# Patient Record
Sex: MALE | Race: BLACK OR AFRICAN AMERICAN | Employment: OTHER | URBAN - METROPOLITAN AREA
[De-identification: names, ages, dates, MRNs, and addresses within clinical notes are randomized per-mention and may not be internally consistent; named-entity substitution may affect disease eponyms.]

---

## 2017-04-24 ENCOUNTER — OFFICE VISIT (OUTPATIENT)
Dept: INTERNAL MEDICINE CLINIC | Age: 67
End: 2017-04-24

## 2017-04-24 VITALS
OXYGEN SATURATION: 94 % | HEIGHT: 67 IN | RESPIRATION RATE: 17 BRPM | DIASTOLIC BLOOD PRESSURE: 70 MMHG | SYSTOLIC BLOOD PRESSURE: 118 MMHG | HEART RATE: 95 BPM | TEMPERATURE: 97 F

## 2017-04-24 DIAGNOSIS — N04.9 NEPHROTIC SYNDROME: Primary | ICD-10-CM

## 2017-04-24 DIAGNOSIS — S88.912D AMPUTATION OF BOTH LOWER EXTREMITIES, SUBSEQUENT ENCOUNTER (HCC): ICD-10-CM

## 2017-04-24 DIAGNOSIS — S88.911D AMPUTATION OF BOTH LOWER EXTREMITIES, SUBSEQUENT ENCOUNTER (HCC): ICD-10-CM

## 2017-04-24 DIAGNOSIS — N18.1 CKD (CHRONIC KIDNEY DISEASE), STAGE I: ICD-10-CM

## 2017-04-24 DIAGNOSIS — I10 ESSENTIAL HYPERTENSION: ICD-10-CM

## 2017-04-24 RX ORDER — BUMETANIDE 2 MG/1
2 TABLET ORAL DAILY
Qty: 30 TAB | Refills: 6 | Status: SHIPPED | OUTPATIENT
Start: 2017-04-24 | End: 2017-05-03

## 2017-04-24 NOTE — PROGRESS NOTES
SPORTS MEDICINE AND PRIMARY CARE  Natty Maxwell MD, [address] [address]  Phone:  [phone]  Fax: [phone]      Chief Complaint   Patient presents with    Follow-up     4 month visit    Cold Symptoms     cough and congestion         SUBECTIVE:    Jesus Manuel Whiteside is a 73 y.o. male Patient is currently complaining of a cold and a cough productive of mucoid sputum. He is using over-the-counter remedies with some relief. Other new complaints are denied. Patient is seen for evaluation. Current Outpatient Prescriptions   Medication Sig Dispense Refill    bumetanide (BUMEX) 2 mg tablet Take 1 Tab by mouth daily. 30 Tab 6    tamsulosin (FLOMAX) 0.4 mg capsule TAKE ONE CAPSULE BY MOUTH DAILY 90 Cap 11    trimethoprim-polymyxin b (POLYTRIM) ophthalmic solution Administer 1 Drop to left eye every four (4) hours. 10 mL 0    benztropine (COGENTIN) 0.5 mg tablet 1 tablet qd 30 Tab 11    mirtazapine (REMERON) 15 mg tablet Take 1 Tab by mouth nightly. 27 Tab 11     Past Medical History:   Diagnosis Date    Amputation of both lower extremities (HCC)     tenderness and swollen left breeast    Aneurysm (HCC)     Conjunctivitis of right eye 08/30/2016    Hypertension     Pancreatic mass     Prediabetes     Renal failure     S/P colonoscopy 1-4-08    Wound, open      Past Surgical History:   Procedure Laterality Date    ABDOMEN SURGERY PROC UNLISTED      colonoscopy    CARDIAC SURG PROCEDURE UNLIST      HX ORTHOPAEDIC      bilateral amputation    HX UROLOGICAL      left orchiectomy    NEUROLOGICAL PROCEDURE UNLISTED  7/2004    aneursym     No Known Allergies    REVIEW OF SYSTEMS:   No hemoptysis.   No mucopurulent material.        Social History     Social History    Marital status:      Spouse name: N/A    Number of children: N/A    Years of education: N/A     Social History Main Topics    Smoking status: Former Smoker    Smokeless tobacco: Never Used    Alcohol use No    Drug use: No    Sexual activity: Yes     Partners: Female     Other Topics Concern    None     Social History Narrative   r  Family History   Problem Relation Age of Onset    No Known Problems Father        OBJECTIVE:  Visit Vitals    /70 (BP 1 Location: Left arm, BP Patient Position: Sitting)    Pulse 95    Temp 97 °F (36.1 °C) (Oral)    Resp 17    Ht 5' 7\" (1.702 m)    SpO2 94%     ENT: perrla,  eom intact  NECK: supple. Thyroid normal  CHEST: clear to ascultation and percussion   HEART: regular rate and rhythm  ABD: soft, bowel sounds active  EXTREMITIES: no edema, pulse 1+     No visits with results within 3 Month(s) from this visit. Latest known visit with results is:    Office Visit on 12/12/2016   Component Date Value Ref Range Status    Hemoglobin A1c 12/12/2016 6.4* 4.8 - 5.6 % Final    Comment:          Pre-diabetes: 5.7 - 6.4           Diabetes: >6.4           Glycemic control for adults with diabetes: <7.0      Estimated average glucose 12/12/2016 137  mg/dL Final    WBC 12/12/2016 5.6  3.4 - 10.8 x10E3/uL Final    RBC 12/12/2016 5.03  4. 14 - 5.80 x10E6/uL Final    HGB 12/12/2016 12.7  12.6 - 17.7 g/dL Final    HCT 12/12/2016 37.7  37.5 - 51.0 % Final    MCV 12/12/2016 75* 79 - 97 fL Final    MCH 12/12/2016 25.2* 26.6 - 33.0 pg Final    MCHC 12/12/2016 33.7  31.5 - 35.7 g/dL Final    RDW 12/12/2016 16.5* 12.3 - 15.4 % Final    PLATELET 84/89/5416 640  150 - 379 x10E3/uL Final    NEUTROPHILS 12/12/2016 39  % Final    Lymphocytes 12/12/2016 45  % Final    MONOCYTES 12/12/2016 8  % Final    EOSINOPHILS 12/12/2016 7  % Final    BASOPHILS 12/12/2016 1  % Final    ABS. NEUTROPHILS 12/12/2016 2.2  1.4 - 7.0 x10E3/uL Final    Abs Lymphocytes 12/12/2016 2.5  0.7 - 3.1 x10E3/uL Final    ABS. MONOCYTES 12/12/2016 0.5  0.1 - 0.9 x10E3/uL Final    ABS. EOSINOPHILS 12/12/2016 0.4  0.0 - 0.4 x10E3/uL Final    ABS.  BASOPHILS 12/12/2016 0.1  0.0 - 0.2 x10E3/uL Final  IMMATURE GRANULOCYTES 12/12/2016 0  % Final    ABS. IMM. GRANS. 12/12/2016 0.0  0.0 - 0.1 x10E3/uL Final    Glucose 12/12/2016 91  65 - 99 mg/dL Final    BUN 12/12/2016 17  8 - 27 mg/dL Final    Creatinine 12/12/2016 1.35* 0.76 - 1.27 mg/dL Final    GFR est non-AA 12/12/2016 55* >59 mL/min/1.73 Final    GFR est AA 12/12/2016 63  >59 mL/min/1.73 Final    BUN/Creatinine ratio 12/12/2016 13  10 - 22 Final    Sodium 12/12/2016 142  134 - 144 mmol/L Final                  **Please note reference interval change**    Potassium 12/12/2016 5.3* 3.5 - 5.2 mmol/L Final    Chloride 12/12/2016 103  96 - 106 mmol/L Final                  **Please note reference interval change**    CO2 12/12/2016 23  18 - 29 mmol/L Final    Calcium 12/12/2016 9.3  8.6 - 10.2 mg/dL Final    Protein, total 12/12/2016 8.1  6.0 - 8.5 g/dL Final    Albumin 12/12/2016 4.4  3.6 - 4.8 g/dL Final    GLOBULIN, TOTAL 12/12/2016 3.7  1.5 - 4.5 g/dL Final    A-G Ratio 12/12/2016 1.2  1.1 - 2.5 Final    Bilirubin, total 12/12/2016 0.3  0.0 - 1.2 mg/dL Final    Alk. phosphatase 12/12/2016 85  39 - 117 IU/L Final    AST (SGOT) 12/12/2016 16  0 - 40 IU/L Final    ALT (SGPT) 12/12/2016 11  0 - 44 IU/L Final          ASSESSMENT:  1. Nephrotic syndrome    2. CKD (chronic kidney disease), stage I    3. Amputation of both lower extremities, subsequent encounter ([facility] [address]    4. Essential hypertension      I suspect the symptoms are more allergy related than actually a viral upper respiratory infection. He is treating with over-the-counter remedies which I suggest he continue. We will check the status of his renal function with a BMP. Blood pressure control is at goal.      He will return to the office in four to six months, sooner if needed. PLAN:  .  Orders Placed This Encounter    RENAL FUNCTION PANEL    bumetanide (BUMEX) 2 mg tablet       Follow-up Disposition:  Return in about 4 months (around 8/24/2017).       ATTENTION: This medical record was transcribed using an electronic medical records system. Although proofread, it may and can contain electronic and spelling errors. Other human spelling and other errors may be present. Corrections may be executed at a later time. Please feel free to contact us for any clarifications as needed.

## 2017-04-24 NOTE — MR AVS SNAPSHOT
Visit Information Date & Time Provider Department Dept. Phone Encounter #  
 4/24/2017 12:30 PM Ladi Mark MD [facility] Sports Medicine and Primary Care [phone] [id number] Follow-up Instructions Return in about 4 months (around 8/24/2017). Follow-up and Disposition History Upcoming Health Maintenance Date Due Pneumococcal 65+ High/Highest Risk (2 of 2 - PPSV23) 2/6/2017 MEDICARE YEARLY EXAM 6/11/2017 FOBT Q 1 YEAR AGE 50-75 12/12/2017 GLAUCOMA SCREENING Q2Y 12/12/2018 DTaP/Tdap/Td series (2 - Td) 12/12/2026 Allergies as of 4/24/2017  Review Complete On: 4/24/2017 By: Ladi Mark MD  
 No Known Allergies Current Immunizations  Reviewed on 6/19/2011 No immunizations on file. Not reviewed this visit You Were Diagnosed With   
  
 Codes Comments Nephrotic syndrome    -  Primary ICD-10-CM: N04.9 ICD-9-CM: 581.9 CKD (chronic kidney disease), stage I     ICD-10-CM: N18.1 ICD-9-CM: 585.1 Amputation of both lower extremities, subsequent encounter [facility])     ICD-10-CM: Z80.768F, Z73.774O ICD-9-CM: V58.89, 897.6 Essential hypertension     ICD-10-CM: I10 
ICD-9-CM: 401.9 Vitals BP Pulse Temp Resp Height(growth percentile) SpO2  
 118/70 (BP 1 Location: Left arm, BP Patient Position: Sitting) 95 97 °F (36.1 °C) (Oral) 17 5' 7\" (1.702 m) 94% Smoking Status Former Smoker Preferred Pharmacy Pharmacy Name Phone [facility] [address] [place] [address] [phone] Your Updated Medication List  
  
   
This list is accurate as of: 4/24/17  2:19 PM.  Always use your most recent med list.  
  
  
  
  
 benztropine 0.5 mg tablet Commonly known as:  COGENTIN  
1 tablet qd  
  
 bumetanide 2 mg tablet Commonly known as:  Laura Salon Take 1 Tab by mouth daily. mirtazapine 15 mg tablet Commonly known as:  Ricco Mech Take 1 Tab by mouth nightly. tamsulosin 0.4 mg capsule Commonly known as:  FLOMAX TAKE ONE CAPSULE BY MOUTH DAILY  
  
 trimethoprim-polymyxin b ophthalmic solution Commonly known as:  POLYTRIM Administer 1 Drop to left eye every four (4) hours. Prescriptions Sent to Pharmacy Refills  
 bumetanide (BUMEX) 2 mg tablet 6 Sig: Take 1 Tab by mouth daily. Class: Normal  
 Pharmacy: [facility] [address] [place] [address] #: [phone] Route: Oral  
  
We Performed the Following [place] COLLECTION VENOUS BLOOD,VENIPUNCTURE [id number] CPT(R)] RENAL FUNCTION PANEL [28902 CPT(R)] Follow-up Instructions Return in about 4 months (around 8/24/2017). Introducing [facility] SERVICES! Jayne Britt introduces [employer] patient portal. Now you can access parts of your medical record, email your doctor's office, and request medication refills online. 1. In your internet browser, go to [email or web address] 2. Click on the First Time User? Click Here link in the Sign In box. You will see the New Member Sign Up page. 3. Enter your [employer] Access Code exactly as it appears below. You will not need to use this code after youve completed the sign-up process. If you do not sign up before the expiration date, you must request a new code. · [employer] Access Code: [email or web address] Expires: 7/23/2017  2:19 PM 
 
4. Enter the last four digits of your Social Security Number (xxxx) and Date of Birth (mm/dd/yyyy) as indicated and click Submit. You will be taken to the next sign-up page. 5. Create a [employer] ID. This will be your [employer] login ID and cannot be changed, so think of one that is secure and easy to remember. 6. Create a [employer] password. You can change your password at any time. 7. Enter your Password Reset Question and Answer.  This can be used at a later time if you forget your password. 8. Enter your e-mail address. You will receive e-mail notification when new information is available in [address] 9. Click Sign Up. You can now view and download portions of your medical record. 10. Click the Download Summary menu link to download a portable copy of your medical information. If you have questions, please visit the Frequently Asked Questions section of the [employer] website. Remember, [employer] is NOT to be used for urgent needs. For medical emergencies, dial 911. Now available from your iPhone and Android! Please provide this summary of care documentation to your next provider. Your primary care clinician is listed as Deetta Spurling. If you have any questions after today's visit, please call [phone].

## 2017-04-24 NOTE — PROGRESS NOTES
1. Have you been to the ER, urgent care clinic since your last visit? Hospitalized since your last visit? No    2. Have you seen or consulted any other health care providers outside of the [address] [place] [address] since your last visit? Include any pap smears or colon screening.  No

## 2017-04-25 LAB
ALBUMIN SERPL-MCNC: 4.5 G/DL (ref 3.6–4.8)
BUN SERPL-MCNC: 24 MG/DL (ref 8–27)
BUN/CREAT SERPL: 16 (ref 10–24)
CALCIUM SERPL-MCNC: 9.5 MG/DL (ref 8.6–10.2)
CHLORIDE SERPL-SCNC: 94 MMOL/L (ref 96–106)
CO2 SERPL-SCNC: 20 MMOL/L (ref 18–29)
CREAT SERPL-MCNC: 1.5 MG/DL (ref 0.76–1.27)
GLUCOSE SERPL-MCNC: 79 MG/DL (ref 65–99)
PHOSPHATE SERPL-MCNC: 3.5 MG/DL (ref 2.5–4.5)
POTASSIUM SERPL-SCNC: 4.9 MMOL/L (ref 3.5–5.2)
SODIUM SERPL-SCNC: 137 MMOL/L (ref 134–144)

## 2017-05-03 ENCOUNTER — TELEPHONE (OUTPATIENT)
Dept: INTERNAL MEDICINE CLINIC | Age: 67
End: 2017-05-03

## 2017-05-03 RX ORDER — FUROSEMIDE 40 MG/1
TABLET ORAL
Qty: 30 TAB | Refills: 11 | Status: SHIPPED | OUTPATIENT
Start: 2017-05-03 | End: 2019-04-01

## 2017-05-03 NOTE — TELEPHONE ENCOUNTER
Patient wife called stating that bumetanide cost to much $47. Patient switched to lasix 40mg 1 tab daily per dr. Sarah Starks.

## 2017-10-25 ENCOUNTER — OFFICE VISIT (OUTPATIENT)
Dept: INTERNAL MEDICINE CLINIC | Age: 67
End: 2017-10-25

## 2017-10-25 VITALS
DIASTOLIC BLOOD PRESSURE: 74 MMHG | SYSTOLIC BLOOD PRESSURE: 120 MMHG | BODY MASS INDEX: 12.22 KG/M2 | WEIGHT: 77.9 LBS | RESPIRATION RATE: 18 BRPM | HEIGHT: 67 IN | HEART RATE: 83 BPM | OXYGEN SATURATION: 94 % | TEMPERATURE: 97.9 F

## 2017-10-25 DIAGNOSIS — Z00.00 MEDICARE ANNUAL WELLNESS VISIT, SUBSEQUENT: ICD-10-CM

## 2017-10-25 DIAGNOSIS — Z13.39 SCREENING FOR ALCOHOLISM: ICD-10-CM

## 2017-10-25 DIAGNOSIS — R35.1 NOCTURIA: ICD-10-CM

## 2017-10-25 DIAGNOSIS — N18.1 CKD (CHRONIC KIDNEY DISEASE), STAGE I: ICD-10-CM

## 2017-10-25 DIAGNOSIS — Z13.31 SCREENING FOR DEPRESSION: ICD-10-CM

## 2017-10-25 DIAGNOSIS — Z71.89 ACP (ADVANCE CARE PLANNING): ICD-10-CM

## 2017-10-25 DIAGNOSIS — Z23 ENCOUNTER FOR IMMUNIZATION: Primary | ICD-10-CM

## 2017-10-25 DIAGNOSIS — R60.0 EDEMA EXTREMITIES: ICD-10-CM

## 2017-10-25 DIAGNOSIS — S88.912D AMPUTATION OF BOTH LOWER EXTREMITIES, SUBSEQUENT ENCOUNTER (HCC): ICD-10-CM

## 2017-10-25 DIAGNOSIS — R79.9 ABNORMAL FINDING OF BLOOD CHEMISTRY: ICD-10-CM

## 2017-10-25 DIAGNOSIS — S88.911D AMPUTATION OF BOTH LOWER EXTREMITIES, SUBSEQUENT ENCOUNTER (HCC): ICD-10-CM

## 2017-10-25 DIAGNOSIS — N04.9 NEPHROTIC SYNDROME: ICD-10-CM

## 2017-10-25 NOTE — PATIENT INSTRUCTIONS
Vaccine Information Statement    Influenza (Flu) Vaccine (Inactivated or Recombinant): What you need to know    Many Vaccine Information Statements are available in Ukrainian and other languages. See www.immunize.org/vis  Hojas de Información Sobre Vacunas están disponibles en Español y en muchos otros idiomas. Visite www.immunize.org/vis    1. Why get vaccinated? Influenza (flu) is a contagious disease that spreads around the [place] every year, usually between October and May. Flu is caused by influenza viruses, and is spread mainly by coughing, sneezing, and close contact. Anyone can get flu. Flu strikes suddenly and can last several days. Symptoms vary by age, but can include:   fever/chills   sore throat   muscle aches   fatigue   cough   headache    runny or stuffy nose    Flu can also lead to pneumonia and blood infections, and cause diarrhea and seizures in children. If you have a medical condition, such as heart or lung disease, flu can make it worse. Flu is more dangerous for some people. Infants and young children, people 72years of age and older, pregnant women, and people with certain health conditions or a weakened immune system are at greatest risk. Each year thousands of people in the [facility] die from flu, and many more are hospitalized. Flu vaccine can:   keep you from getting flu,   make flu less severe if you do get it, and   keep you from spreading flu to your family and other people. 2. Inactivated and recombinant flu vaccines    A dose of flu vaccine is recommended every flu season. Children 6 months through 6years of age may need two doses during the same flu season. Everyone else needs only one dose each flu season.        Some inactivated flu vaccines contain a very small amount of a mercury-based preservative called thimerosal. Studies have not shown thimerosal in vaccines to be harmful, but flu vaccines that do not contain thimerosal are available. There is no live flu virus in flu shots. They cannot cause the flu. There are many flu viruses, and they are always changing. Each year a new flu vaccine is made to protect against three or four viruses that are likely to cause disease in the upcoming flu season. But even when the vaccine doesnt exactly match these viruses, it may still provide some protection    Flu vaccine cannot prevent:   flu that is caused by a virus not covered by the vaccine, or   illnesses that look like flu but are not. It takes about 2 weeks for protection to develop after vaccination, and protection lasts through the flu season. 3. Some people should not get this vaccine    Tell the person who is giving you the vaccine:     If you have any severe, life-threatening allergies. If you ever had a life-threatening allergic reaction after a dose of flu vaccine, or have a severe allergy to any part of this vaccine, you may be advised not to get vaccinated. Most, but not all, types of flu vaccine contain a small amount of egg protein.  If you ever had Guillain-Barré Syndrome (also called GBS). Some people with a history of GBS should not get this vaccine. This should be discussed with your doctor.  If you are not feeling well. It is usually okay to get flu vaccine when you have a mild illness, but you might be asked to come back when you feel better. 4. Risks of a vaccine reaction    With any medicine, including vaccines, there is a chance of reactions. These are usually mild and go away on their own, but serious reactions are also possible. Most people who get a flu shot do not have any problems with it.      Minor problems following a flu shot include:    soreness, redness, or swelling where the shot was given     hoarseness   sore, red or itchy eyes   cough   fever   aches   headache   itching   fatigue  If these problems occur, they usually begin soon after the shot and last 1 or 2 days. More serious problems following a flu shot can include the following:     There may be a small increased risk of Guillain-Barré Syndrome (GBS) after inactivated flu vaccine. This risk has been estimated at 1 or 2 additional cases per million people vaccinated. This is much lower than the risk of severe complications from flu, which can be prevented by flu vaccine.  Young children who get the flu shot along with pneumococcal vaccine (PCV13) and/or DTaP vaccine at the same time might be slightly more likely to have a seizure caused by fever. Ask your doctor for more information. Tell your doctor if a child who is getting flu vaccine has ever had a seizure. Problems that could happen after any injected vaccine:      People sometimes faint after a medical procedure, including vaccination. Sitting or lying down for about 15 minutes can help prevent fainting, and injuries caused by a fall. Tell your doctor if you feel dizzy, or have vision changes or ringing in the ears.  Some people get severe pain in the shoulder and have difficulty moving the arm where a shot was given. This happens very rarely.  Any medication can cause a severe allergic reaction. Such reactions from a vaccine are very rare, estimated at about 1 in a million doses, and would happen within a few minutes to a few hours after the vaccination. As with any medicine, there is a very remote chance of a vaccine causing a serious injury or death. The safety of vaccines is always being monitored. For more information, visit: www.cdc.gov/vaccinesafety/    5. What if there is a serious reaction? What should I look for?  Look for anything that concerns you, such as signs of a severe allergic reaction, very high fever, or unusual behavior.     Signs of a severe allergic reaction can include hives, swelling of the face and throat, difficulty breathing, a fast heartbeat, dizziness, and weakness  usually within a few minutes to a few hours after the vaccination. What should I do?  If you think it is a severe allergic reaction or other emergency that cant wait, call 9-1-1 and get the person to the nearest hospital. Otherwise, call your doctor.  Reactions should be reported to the Vaccine Adverse Event Reporting System (VAERS). Your doctor should file this report, or you can do it yourself through  the VAERS web site at www.vaers. [facility].gov, or by calling 7-171.341.4460. VAERS does not give medical advice. 6. The National Vaccine Injury Compensation Program    The [facility] Vaccine Injury Compensation Program (VICP) is a federal program that was created to compensate people who may have been injured by certain vaccines. Persons who believe they may have been injured by a vaccine can learn about the program and about filing a claim by calling 3-199.491.7275 or visiting the [employer] website at www.[facility].gov/vaccinecompensation. There is a time limit to file a claim for compensation. 7. How can I learn more?  Ask your healthcare provider. He or she can give you the vaccine package insert or suggest other sources of information.  Call your local or state health department.  Contact the Centers for Disease Control and Prevention (CDC):  - Call 4-701.835.1358 (1-800-CDC-INFO) or  - Visit CDCs website at www.cdc.gov/flu    Vaccine Information Statement   Inactivated Influenza Vaccine   8/7/2015  42 SAV [employer] [id number]    Department of Health and Human Services  Centers for Disease Control and Prevention    Office Use Only      Medicare Wellness Visit, Male    The best way to live healthy is to have a healthy lifestyle by eating a well-balanced diet, exercising regularly, limiting alcohol and stopping smoking. Regular physical exams and screening tests are another way to keep healthy. Preventive exams provided by your health care provider can find health problems before they become diseases or illnesses. Preventive services including immunizations, screening tests, monitoring and exams can help you take care of your own health. All people over age 72 should have a pneumovax  and and a prevnar shot to prevent pneumonia. These are once in a lifetime unless you and your provider decide differently. All people over 65 should have a yearly flu shot and a tetanus vaccine every 10 years. Screening for diabetes mellitus with a blood sugar test should be done every year. Glaucoma is a disease of the eye due to increased ocular pressure that can lead to blindness and it should be done every year by an eye professional.    Cardiovascular screening tests that check for elevated lipids (fatty part of blood) which can lead to heart disease and strokes should be done every 5 years. Colorectal screening that evaluates for blood or polyps in your colon should be done yearly as a stool test or every five years as a flexible sigmoidoscope or every 10 years as a colonoscopy up to age 76. Men up to age 76 may need a screening blood test for prostate cancer at certain intervals, depending on their personal and family history. This decision is between the patient and his provider. If you have been a smoker or had family history of abdominal aortic aneurysms, you and your provider may decide to schedule an ultrasound test of your aorta. Hepatitis C screening is also recommended for anyone born between [birth year] through [address]. A shingles vaccine is also recommended once in a lifetime after age 61. Your Medicare Wellness Exam is recommended annually.     Here is a list of your current Health Maintenance items with a due date:  Health Maintenance Due   Topic Date Due    Pneumococcal Vaccine (2 of 2 - PPSV23) 02/06/2017    Annual Well Visit  06/11/2017    Flu Vaccine  08/01/2017    Stool testing for trace blood  12/12/2017

## 2017-10-25 NOTE — PROGRESS NOTES
1. Have you been to the ER, urgent care clinic since your last visit? Hospitalized since your last visit? No    2. Have you seen or consulted any other health care providers outside of the [address] [place] [address] since your last visit? Include any pap smears or colon screening. No      This is a Subsequent Medicare Annual Wellness Exam (AWV) (Performed 12 months after IPPE or effective date of Medicare Part B enrollment)    I have reviewed the patient's medical history in detail and updated the computerized patient record. History     Past Medical History:   Diagnosis Date    Amputation of both lower extremities (HCC)     tenderness and swollen left breeast    Aneurysm (HCC)     Conjunctivitis of right eye 08/30/2016    Hypertension     Pancreatic mass     Prediabetes     Renal failure     S/P colonoscopy 1-4-08    Wound, open       Past Surgical History:   Procedure Laterality Date    ABDOMEN SURGERY PROC UNLISTED      colonoscopy    CARDIAC SURG PROCEDURE UNLIST      HX ORTHOPAEDIC      bilateral amputation    HX UROLOGICAL      left orchiectomy    NEUROLOGICAL PROCEDURE UNLISTED  7/2004    aneursym     Current Outpatient Prescriptions   Medication Sig Dispense Refill    furosemide (LASIX) 40 mg tablet Take 1 tablet every morning 30 Tab 11    benztropine (COGENTIN) 0.5 mg tablet 1 tablet qd 30 Tab 11    mirtazapine (REMERON) 15 mg tablet Take 1 Tab by mouth nightly.  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    Nephrotic syndrome N04.9    Edema extremities R60.0    CKD (chronic kidney disease), stage I N18.1    Essential hypertension, benign I10    S/P cerebral aneurysm repair Z98.890, Z86.79    Nausea and vomiting R11.2    Abdominal pain R10.9    Pancreatic mass K86.9    CKD (chronic kidney disease) N18.9    Nephrotic range proteinuria R80.9    Decubitus ulcer, stage IV (HCC) L89.94    Pneumonia J18.9    Pleural effusion on right J90    Nephrotic syndrome N04.9    Decubitus ulcer of sacral area L89.159    Anasarca R60.1    Hypoalbuminemia E88.09    Hypertension I10    Amputation of both lower extremities ([address] H62.509A, R23.142J    Renal failure N19    Prediabetes R73.03    S/P colonoscopy Z98.890    Conjunctivitis of right eye H10.9       Depression Risk Factor Screening:     PHQ over the last two weeks 10/25/2017   PHQ Not Done -   Little interest or pleasure in doing things Not at all   Feeling down, depressed or hopeless Not at all   Total Score PHQ 2 0     Alcohol Risk Factor Screening: You do not drink alcohol or very rarely. Functional Ability and Level of Safety:   Hearing Loss  Hearing is good. Activities of Daily Living  The home contains: no safety equipment. Patient does total self care    Fall Risk  Fall Risk Assessment, last 12 mths 10/25/2017   Able to walk? No   Fall in past 12 months? -       Abuse Screen  Patient is not abused    Cognitive Screening   Evaluation of Cognitive Function:  Has your family/caregiver stated any concerns about your memory: no  Normal    Patient Care Team   Patient Care Team:  Hunter Tamez MD as PCP - General (Internal Medicine)    Assessment/Plan   Education and counseling provided:  Are appropriate based on today's review and evaluation    Diagnoses and all orders for this visit:    1. Encounter for immunization  -     Influenza virus vaccine (FLUZONE HIGH-DOSE) 65 years and older  -     DE IMMUNIZ ADMIN,1 SINGLE/COMB VAC/TOXOID    2. Medicare annual wellness visit, subsequent    3. Screening for alcoholism  -     Annual  Alcohol Screen 15 min ()    4. Screening for depression  -     Depression Screen Annual    5. Amputation of both lower extremities, subsequent encounter ([facility] [address]    6. Nephrotic syndrome  -     LIPID PANEL    7. Edema extremities    8. CKD (chronic kidney disease), stage I  -     CBC WITH AUTOMATED DIFF  -     METABOLIC PANEL, COMPREHENSIVE  -     LIPID PANEL  -     PROSTATE SPECIFIC AG  -     MS COLLECTION VENOUS BLOOD,VENIPUNCTURE  -     HEMOGLOBIN A1C WITH EAG    9. ACP (advance care planning)  -     FULL CODE    10. Nocturia   -     PROSTATE SPECIFIC AG    11. Abnormal finding of blood chemistry   -     HEMOGLOBIN A1C WITH EAG        Health Maintenance Due   Topic Date Due    Pneumococcal 65+ High/Highest Risk (2 of 2 - PPSV23) 02/06/2017    MEDICARE YEARLY EXAM  06/11/2017    INFLUENZA AGE 9 TO ADULT  08/01/2017    FOBT Q 1 YEAR AGE 50-75  12/12/2017   Advance Care Planning (ACP) Provider Conversation Snapshot    Date of ACP Conversation: 10/25/17  Persons included in Conversation:  patient  Length of ACP Conversation in minutes:  <16 minutes (Non-Billable)    Authorized Decision Maker (if patient is incapable of making informed decisions): This person is:   Healthcare Agent/Medical Power of  under Advance Directive  wife        For Patients with Decision Making Capacity:   Values/Goals: Exploration of values, goals, and preferences if recovery is not expected, even with continued medical treatment in the event of:  Imminent death    Conversation Outcomes / Follow-Up Plan:   Recommended completion of Advance Directive form after review of ACP materials and conversation with prospective healthcare agent   [address] Rubina Fletcher MD, [address] [facility]  Phone:  [phone]  Fax: [phone]      Chief Complaint   Patient presents with    Annual Wellness Visit         SUBECTIVE:    Durga Tavares is a 78 y.o. male Patient returns today alert, appropriate, ambulatory and has the capacity to give an accurate history.   Patient has a known history of nephrotic syndrome, status post bilateral leg amputation, prediabetes, CKD, and is seen for evaluation. He voices no new complaints. He comes in today with his wife. Current Outpatient Prescriptions   Medication Sig Dispense Refill    furosemide (LASIX) 40 mg tablet Take 1 tablet every morning 30 Tab 11    benztropine (COGENTIN) 0.5 mg tablet 1 tablet qd 30 Tab 11    mirtazapine (REMERON) 15 mg tablet Take 1 Tab by mouth nightly. 27 Tab 11     Past Medical History:   Diagnosis Date    Amputation of both lower extremities (HCC)     tenderness and swollen left breeast    Aneurysm (HCC)     Conjunctivitis of right eye 08/30/2016    Hypertension     Pancreatic mass     Prediabetes     Renal failure     S/P colonoscopy 1-4-08    Wound, open      Past Surgical History:   Procedure Laterality Date    ABDOMEN SURGERY PROC UNLISTED      colonoscopy    CARDIAC SURG PROCEDURE UNLIST      HX ORTHOPAEDIC      bilateral amputation    HX UROLOGICAL      left orchiectomy    NEUROLOGICAL PROCEDURE UNLISTED  7/2004    aneursym     No Known Allergies    REVIEW OF SYSTEMS:   There has been no chest pain, no shortness of breath. Social History     Social History    Marital status:      Spouse name: N/A    Number of children: N/A    Years of education: N/A     Social History Main Topics    Smoking status: Former Smoker    Smokeless tobacco: Never Used    Alcohol use No    Drug use: No    Sexual activity: Yes     Partners: Female     Other Topics Concern    None     Social History Narrative   r  Family History   Problem Relation Age of Onset    No Known Problems Father        OBJECTIVE:  Visit Vitals    /74    Pulse 83    Temp 97.9 °F (36.6 °C) (Oral)    Resp 18    Ht 5' 7\" (1.702 m)    Wt 77 lb 14.4 oz (35.3 kg)    SpO2 94%    BMI 12.2 kg/m2     ENT: perrla,  eom intact  NECK: supple.  Thyroid normal  CHEST: clear to ascultation and percussion   HEART: regular rate and rhythm  ABD: soft, bowel sounds active  EXTREMITIES: no edema, pulse 1+     No visits with results within 3 Month(s) from this visit. Latest known visit with results is:    Office Visit on 04/24/2017   Component Date Value Ref Range Status    Glucose 04/24/2017 79  65 - 99 mg/dL Final    BUN 04/24/2017 24  8 - 27 mg/dL Final    Creatinine 04/24/2017 1.50* 0.76 - 1.27 mg/dL Final    GFR est non-AA 04/24/2017 48* >59 mL/min/1.73 Final    GFR est AA 04/24/2017 56* >59 mL/min/1.73 Final    BUN/Creatinine ratio 04/24/2017 16  10 - 24 Final    Sodium 04/24/2017 137  134 - 144 mmol/L Final    Potassium 04/24/2017 4.9  3.5 - 5.2 mmol/L Final    Chloride 04/24/2017 94* 96 - 106 mmol/L Final    CO2 04/24/2017 20  18 - 29 mmol/L Final    Calcium 04/24/2017 9.5  8.6 - 10.2 mg/dL Final    Phosphorus 04/24/2017 3.5  2.5 - 4.5 mg/dL Final    Albumin 04/24/2017 4.5  3.6 - 4.8 g/dL Final          ASSESSMENT:  1. Encounter for immunization    2. Medicare annual wellness visit, subsequent    3. Screening for alcoholism    4. Screening for depression    5. Amputation of both lower extremities, subsequent encounter ([facility] [address]    6. Nephrotic syndrome    7. Edema extremities    8. CKD (chronic kidney disease), stage I    9. ACP (advance care planning)    10. Nocturia     11. Abnormal finding of blood chemistry       Patient's medical status is stable. His renal status is followed by Waldemar Goodpasture, and has been stable. Will confirm that along with his yearly metabolic evaluation today. His blood pressure control is at goal.    He'll return to the office in six months, sooner if he has any problems. We remind him that he can come in at any time without an appointment should he have any urgency.         PLAN:  .  Orders Placed This Encounter    Depression Screen Annual    Influenza virus vaccine ([address]) 65 years and older    CBC WITH AUTOMATED DIFF    METABOLIC PANEL, COMPREHENSIVE    LIPID PANEL    PROSTATE SPECIFIC AG    HEMOGLOBIN A1C WITH EAG       Follow-up Disposition:  Return in about 6 months (around 4/25/2018). ATTENTION:   This medical record was transcribed using an electronic medical records system. Although proofread, it may and can contain electronic and spelling errors. Other human spelling and other errors may be present. Corrections may be executed at a later time. Please feel free to contact us for any clarifications as needed.

## 2017-10-25 NOTE — MR AVS SNAPSHOT
Visit Information Date & Time Provider Department Dept. Phone Encounter #  
 10/25/2017 10:30 AM Ahsan Jasso MD [facility] [address] [id number] Follow-up Instructions Return in about 6 months (around 4/25/2018). Routing History Follow-up and Disposition History Upcoming Health Maintenance Date Due Pneumococcal 65+ High/Highest Risk (2 of 2 - PPSV23) 2/6/2017 MEDICARE YEARLY EXAM 6/11/2017 INFLUENZA AGE 9 TO ADULT 8/1/2017 FOBT Q 1 YEAR AGE 50-75 12/12/2017 GLAUCOMA SCREENING Q2Y 12/12/2018 DTaP/Tdap/Td series (2 - Td) 12/12/2026 Allergies as of 10/25/2017  Review Complete On: 10/25/2017 By: Ahsan Jasso MD  
 No Known Allergies Current Immunizations  Reviewed on 6/19/2011 Name Date Influenza High Dose Vaccine PF 10/25/2017 Not reviewed this visit You Were Diagnosed With   
  
 Codes Comments Encounter for immunization    -  Primary ICD-10-CM: N41 ICD-9-CM: V03.89 Medicare annual wellness visit, subsequent     ICD-10-CM: Z00.00 ICD-9-CM: V70.0 Screening for alcoholism     ICD-10-CM: Z13.89 ICD-9-CM: V79.1 Screening for depression     ICD-10-CM: Z13.89 ICD-9-CM: V79.0 Amputation of both lower extremities, subsequent encounter [facility])     ICD-10-CM: G48.427Q, F66.479P ICD-9-CM: V58.89, 897.6 Nephrotic syndrome     ICD-10-CM: N04.9 ICD-9-CM: 581.9 Edema extremities     ICD-10-CM: R60.0 ICD-9-CM: 782.3 CKD (chronic kidney disease), stage I     ICD-10-CM: N18.1 ICD-9-CM: 585.1 ACP (advance care planning)     ICD-10-CM: Z71.89 ICD-9-CM: V65.49 Nocturia     ICD-10-CM: R35.1 ICD-9-CM: 788.43 Abnormal finding of blood chemistry     ICD-10-CM: R79.9 ICD-9-CM: 790.6 Vitals BP Pulse Temp Resp Height(growth percentile) Weight(growth percentile) 120/74 83 97.9 °F (36.6 °C) (Oral) 18 5' 7\" (1.702 m) 77 lb 14.4 oz (35.3 kg) SpO2 BMI Smoking Status 94% 12.2 kg/m2 Former Smoker BMI and BSA Data Body Mass Index Body Surface Area  
 12.2 kg/m 2 1.29 m 2 Preferred Pharmacy Pharmacy Name Phone [facility] [address], [address] [place] [address] Drive [phone] Your Updated Medication List  
  
   
This list is accurate as of: 10/25/17 11:57 AM.  Always use your most recent med list.  
  
  
  
  
 benztropine 0.5 mg tablet Commonly known as:  COGENTIN  
1 tablet qd  
  
 furosemide 40 mg tablet Commonly known as:  LASIX Take 1 tablet every morning  
  
 mirtazapine 15 mg tablet Commonly known as:  Margaretta Halt Take 1 Tab by mouth nightly. We Performed the Following CBC WITH AUTOMATED DIFF [18989 CPT(R)] [address] [[id number] HCPCS] FULL CODE [COD2 Custom] HEMOGLOBIN A1C WITH EAG [01700 CPT(R)] INFLUENZA VIRUS VACCINE, HIGH DOSE SEASONAL, PRESERVATIVE FREE [87228 CPT(R)] LIPID PANEL [09994 CPT(R)] METABOLIC PANEL, COMPREHENSIVE [51807 CPT(R)] LA ANNUAL ALCOHOL SCREEN 15 MIN [id number] HCPCS] LA COLLECTION VENOUS BLOOD,VENIPUNCTURE Q1321397 CPT(R)] LA IMMUNIZ ADMIN,1 SINGLE/COMB VAC/TOXOID D0898832 CPT(R)] PSA, DIAGNOSTIC (PROSTATE SPECIFIC AG) Q244920 CPT(R)] Follow-up Instructions Return in about 6 months (around 4/25/2018). Patient Instructions Vaccine Information Statement Influenza (Flu) Vaccine (Inactivated or Recombinant): What you need to know Many Vaccine Information Statements are available in Korean and other languages. See www.immunize.org/vis Hojas de Información Sobre Vacunas están disponibles en Español y en muchos otros idiomas. Visite www.immunize.org/vis 1. Why get vaccinated? Influenza (flu) is a contagious disease that spreads around the [place] every year, usually between October and May. Flu is caused by influenza viruses, and is spread mainly by coughing, sneezing, and close contact. Anyone can get flu. Flu strikes suddenly and can last several days. Symptoms vary by age, but can include: 
 fever/chills  sore throat  muscle aches  fatigue  cough  headache  runny or stuffy nose Flu can also lead to pneumonia and blood infections, and cause diarrhea and seizures in children. If you have a medical condition, such as heart or lung disease, flu can make it worse. Flu is more dangerous for some people. Infants and young children, people 72years of age and older, pregnant women, and people with certain health conditions or a weakened immune system are at greatest risk. Each year thousands of people in the [facility] die from flu, and many more are hospitalized. Flu vaccine can: 
 keep you from getting flu, 
 make flu less severe if you do get it, and 
 keep you from spreading flu to your family and other people. 2. Inactivated and recombinant flu vaccines A dose of flu vaccine is recommended every flu season. Children 6 months through 6years of age may need two doses during the same flu season. Everyone else needs only one dose each flu season. Some inactivated flu vaccines contain a very small amount of a mercury-based preservative called thimerosal. Studies have not shown thimerosal in vaccines to be harmful, but flu vaccines that do not contain thimerosal are available. There is no live flu virus in flu shots. They cannot cause the flu. There are many flu viruses, and they are always changing. Each year a new flu vaccine is made to protect against three or four viruses that are likely to cause disease in the upcoming flu season. But even when the vaccine doesnt exactly match these viruses, it may still provide some protection Flu vaccine cannot prevent: 
 flu that is caused by a virus not covered by the vaccine, or 
  illnesses that look like flu but are not. It takes about 2 weeks for protection to develop after vaccination, and protection lasts through the flu season. 3. Some people should not get this vaccine Tell the person who is giving you the vaccine:  If you have any severe, life-threatening allergies. If you ever had a life-threatening allergic reaction after a dose of flu vaccine, or have a severe allergy to any part of this vaccine, you may be advised not to get vaccinated. Most, but not all, types of flu vaccine contain a small amount of egg protein.  If you ever had Guillain-Barré Syndrome (also called GBS). Some people with a history of GBS should not get this vaccine. This should be discussed with your doctor.  If you are not feeling well. It is usually okay to get flu vaccine when you have a mild illness, but you might be asked to come back when you feel better. 4. Risks of a vaccine reaction With any medicine, including vaccines, there is a chance of reactions. These are usually mild and go away on their own, but serious reactions are also possible. Most people who get a flu shot do not have any problems with it. Minor problems following a flu shot include:  
 soreness, redness, or swelling where the shot was given  hoarseness  sore, red or itchy eyes  cough  fever  aches  headache  itching  fatigue If these problems occur, they usually begin soon after the shot and last 1 or 2 days. More serious problems following a flu shot can include the following:  There may be a small increased risk of Guillain-Barré Syndrome (GBS) after inactivated flu vaccine. This risk has been estimated at 1 or 2 additional cases per million people vaccinated. This is much lower than the risk of severe complications from flu, which can be prevented by flu vaccine.    
 
 Young children who get the flu shot along with pneumococcal vaccine (PCV13) and/or DTaP vaccine at the same time might be slightly more likely to have a seizure caused by fever. Ask your doctor for more information. Tell your doctor if a child who is getting flu vaccine has ever had a seizure. Problems that could happen after any injected vaccine:  People sometimes faint after a medical procedure, including vaccination. Sitting or lying down for about 15 minutes can help prevent fainting, and injuries caused by a fall. Tell your doctor if you feel dizzy, or have vision changes or ringing in the ears.  Some people get severe pain in the shoulder and have difficulty moving the arm where a shot was given. This happens very rarely.  Any medication can cause a severe allergic reaction. Such reactions from a vaccine are very rare, estimated at about 1 in a million doses, and would happen within a few minutes to a few hours after the vaccination. As with any medicine, there is a very remote chance of a vaccine causing a serious injury or death. The safety of vaccines is always being monitored. For more information, visit: www.cdc.gov/vaccinesafety/ 
 
 
The [facility] Vaccine Injury Compensation Program (VICP) is a federal program that was created to compensate people who may have been injured by certain vaccines. Persons who believe they may have been injured by a vaccine can learn about the program and about filing a claim by calling 6-823.870.2401 or visiting the [employer] [address] website at www.[facility].gov/vaccinecompensation. There is a time limit to file a claim for compensation. 7. How can I learn more?  Ask your healthcare provider. He or she can give you the vaccine package insert or suggest other sources of information.  Call your local or state health department.  Contact the Centers for Disease Control and Prevention (CDC): 
- Call 3-348.551.2796 (5-893-GTZ-INFO) or 
- Visit CDCs website at www.cdc.gov/flu Vaccine Information Statement Inactivated Influenza Vaccine 8/7/2015 
42 SAV [address]  219AG-95 Department of Health and j-Grab Centers for Disease Control and Prevention Office Use Only Medicare Wellness Visit, Male The best way to live healthy is to have a healthy lifestyle by eating a well-balanced diet, exercising regularly, limiting alcohol and stopping smoking. Regular physical exams and screening tests are another way to keep healthy. Preventive exams provided by your health care provider can find health problems before they become diseases or illnesses. Preventive services including immunizations, screening tests, monitoring and exams can help you take care of your own health. All people over age 72 should have a pneumovax  and and a prevnar shot to prevent pneumonia. These are once in a lifetime unless you and your provider decide differently. All people over 65 should have a yearly flu shot and a tetanus vaccine every 10 years. Screening for diabetes mellitus with a blood sugar test should be done every year. Glaucoma is a disease of the eye due to increased ocular pressure that can lead to blindness and it should be done every year by an eye professional. 
 
Cardiovascular screening tests that check for elevated lipids (fatty part of blood) which can lead to heart disease and strokes should be done every 5 years. Colorectal screening that evaluates for blood or polyps in your colon should be done yearly as a stool test or every five years as a flexible sigmoidoscope or every 10 years as a colonoscopy up to age 76. Men up to age 76 may need a screening blood test for prostate cancer at certain intervals, depending on their personal and family history. This decision is between the patient and his provider. If you have been a smoker or had family history of abdominal aortic aneurysms, you and your provider may decide to schedule an ultrasound test of your aorta. Hepatitis C screening is also recommended for anyone born between [birth year] through [address]. A shingles vaccine is also recommended once in a lifetime after age 61. Your Medicare Wellness Exam is recommended annually. Here is a list of your current Health Maintenance items with a due date: 
Health Maintenance Due Topic Date Due  Pneumococcal Vaccine (2 of 2 - PPSV23) 02/06/2017 [address] [place] [address] Annual Well Visit  06/11/2017  Flu Vaccine  08/01/2017  Stool testing for trace blood  12/12/2017 Patient Instructions History Introducing [facility]! Bradford Cha introduces [employer] patient portal. Now you can access parts of your medical record, email your doctor's office, and request medication refills online. 1. In your internet browser, go to [email or web address] 2. Click on the First Time User? Click Here link in the Sign In box. You will see the New Member Sign Up page. 3. Enter your [employer] Access Code exactly as it appears below. You will not need to use this code after youve completed the sign-up process. If you do not sign up before the expiration date, you must request a new code. · [email or web address] Access Code: [email or web address] Expires: 1/23/2018 10:56 AM 
 
4. Enter the last four digits of your Social Security Number (xxxx) and Date of Birth (mm/dd/yyyy) as indicated and click Submit. You will be taken to the next sign-up page. 5. Create a [employer] ID. This will be your [employer] login ID and cannot be changed, so think of one that is secure and easy to remember. 6. Create a [employer] password. You can change your password at any time. 7. Enter your Password Reset Question and Answer. This can be used at a later time if you forget your password. 8. Enter your e-mail address. You will receive e-mail notification when new information is available in [address] 9. Click Sign Up. You can now view and download portions of your medical record. 10. Click the Download Summary menu link to download a portable copy of your medical information. If you have questions, please visit the Frequently Asked Questions section of the [email or web address] website. Remember, [employer] is NOT to be used for urgent needs. For medical emergencies, dial 911. Now available from your iPhone and Android! Please provide this summary of care documentation to your next provider. Your primary care clinician is listed as Shady Reece. If you have any questions after today's visit, please call [phone].

## 2017-10-26 LAB
ALBUMIN SERPL-MCNC: 4.2 G/DL (ref 3.6–4.8)
ALBUMIN/GLOB SERPL: 1.2 {RATIO} (ref 1.2–2.2)
ALP SERPL-CCNC: 72 IU/L (ref 39–117)
ALT SERPL-CCNC: 11 IU/L (ref 0–44)
AST SERPL-CCNC: 14 IU/L (ref 0–40)
BASOPHILS # BLD AUTO: 0.1 X10E3/UL (ref 0–0.2)
BASOPHILS NFR BLD AUTO: 1 %
BILIRUB SERPL-MCNC: 0.5 MG/DL (ref 0–1.2)
BUN SERPL-MCNC: 17 MG/DL (ref 8–27)
BUN/CREAT SERPL: 15 (ref 10–24)
CALCIUM SERPL-MCNC: 9.6 MG/DL (ref 8.6–10.2)
CHLORIDE SERPL-SCNC: 100 MMOL/L (ref 96–106)
CHOLEST SERPL-MCNC: 182 MG/DL (ref 100–199)
CO2 SERPL-SCNC: 26 MMOL/L (ref 18–29)
CREAT SERPL-MCNC: 1.16 MG/DL (ref 0.76–1.27)
EOSINOPHIL # BLD AUTO: 0.3 X10E3/UL (ref 0–0.4)
EOSINOPHIL NFR BLD AUTO: 7 %
ERYTHROCYTE [DISTWIDTH] IN BLOOD BY AUTOMATED COUNT: 16.1 % (ref 12.3–15.4)
EST. AVERAGE GLUCOSE BLD GHB EST-MCNC: 117 MG/DL
GFR SERPLBLD CREATININE-BSD FMLA CKD-EPI: 65 ML/MIN/1.73
GFR SERPLBLD CREATININE-BSD FMLA CKD-EPI: 75 ML/MIN/1.73
GLOBULIN SER CALC-MCNC: 3.6 G/DL (ref 1.5–4.5)
GLUCOSE SERPL-MCNC: 83 MG/DL (ref 65–99)
HBA1C MFR BLD: 5.7 % (ref 4.8–5.6)
HCT VFR BLD AUTO: 35.9 % (ref 37.5–51)
HDLC SERPL-MCNC: 72 MG/DL
HGB BLD-MCNC: 11.9 G/DL (ref 12.6–17.7)
IMM GRANULOCYTES # BLD: 0 X10E3/UL (ref 0–0.1)
IMM GRANULOCYTES NFR BLD: 0 %
LDLC SERPL CALC-MCNC: 102 MG/DL (ref 0–99)
LYMPHOCYTES # BLD AUTO: 1.9 X10E3/UL (ref 0.7–3.1)
LYMPHOCYTES NFR BLD AUTO: 42 %
MCH RBC QN AUTO: 24.5 PG (ref 26.6–33)
MCHC RBC AUTO-ENTMCNC: 33.1 G/DL (ref 31.5–35.7)
MCV RBC AUTO: 74 FL (ref 79–97)
MONOCYTES # BLD AUTO: 0.4 X10E3/UL (ref 0.1–0.9)
MONOCYTES NFR BLD AUTO: 9 %
MORPHOLOGY BLD-IMP: ABNORMAL
NEUTROPHILS # BLD AUTO: 1.9 X10E3/UL (ref 1.4–7)
NEUTROPHILS NFR BLD AUTO: 41 %
PLATELET # BLD AUTO: 116 X10E3/UL (ref 150–379)
POTASSIUM SERPL-SCNC: 4.7 MMOL/L (ref 3.5–5.2)
PROT SERPL-MCNC: 7.8 G/DL (ref 6–8.5)
PSA SERPL-MCNC: 0.8 NG/ML (ref 0–4)
RBC # BLD AUTO: 4.85 X10E6/UL (ref 4.14–5.8)
SODIUM SERPL-SCNC: 140 MMOL/L (ref 134–144)
TRIGL SERPL-MCNC: 42 MG/DL (ref 0–149)
VLDLC SERPL CALC-MCNC: 8 MG/DL (ref 5–40)
WBC # BLD AUTO: 4.6 X10E3/UL (ref 3.4–10.8)

## 2018-04-25 ENCOUNTER — OFFICE VISIT (OUTPATIENT)
Dept: INTERNAL MEDICINE CLINIC | Age: 68
End: 2018-04-25

## 2018-04-25 VITALS
HEIGHT: 67 IN | DIASTOLIC BLOOD PRESSURE: 78 MMHG | HEART RATE: 83 BPM | TEMPERATURE: 97.3 F | SYSTOLIC BLOOD PRESSURE: 120 MMHG | OXYGEN SATURATION: 96 % | RESPIRATION RATE: 20 BRPM

## 2018-04-25 DIAGNOSIS — N04.9 NEPHROTIC SYNDROME: ICD-10-CM

## 2018-04-25 DIAGNOSIS — R73.03 PREDIABETES: ICD-10-CM

## 2018-04-25 DIAGNOSIS — N18.1 CKD (CHRONIC KIDNEY DISEASE), STAGE I: Primary | ICD-10-CM

## 2018-04-25 DIAGNOSIS — I10 ESSENTIAL HYPERTENSION, BENIGN: ICD-10-CM

## 2018-04-25 DIAGNOSIS — Z98.890 S/P CEREBRAL ANEURYSM REPAIR: ICD-10-CM

## 2018-04-25 DIAGNOSIS — S88.912D AMPUTATION OF BOTH LOWER EXTREMITIES, SUBSEQUENT ENCOUNTER (HCC): ICD-10-CM

## 2018-04-25 DIAGNOSIS — M94.9 DISORDER OF CARTILAGE: ICD-10-CM

## 2018-04-25 DIAGNOSIS — Z12.11 SCREEN FOR COLON CANCER: ICD-10-CM

## 2018-04-25 DIAGNOSIS — S88.911D AMPUTATION OF BOTH LOWER EXTREMITIES, SUBSEQUENT ENCOUNTER (HCC): ICD-10-CM

## 2018-04-25 DIAGNOSIS — Z86.79 S/P CEREBRAL ANEURYSM REPAIR: ICD-10-CM

## 2018-04-25 NOTE — PROGRESS NOTES
1. Have you been to the ER, urgent care clinic since your last visit? Hospitalized since your last visit? No    2. Have you seen or consulted any other health care providers outside of the [address] [place] [address] since your last visit? Include any pap smears or colon screening.  No

## 2018-04-25 NOTE — ACP (ADVANCE CARE PLANNING)

## 2018-04-25 NOTE — PROGRESS NOTES
SPORTS MEDICINE AND PRIMARY CARE  Heidy Harding MD, [address] [address]  Phone:  [phone]  Fax: [phone]       Chief Complaint   Patient presents with    Hypertension     f/u   . SUBJECTIVE:    Atiya Barton is a 78 y.o. male Patient returns today with his wife voicing no new complaints. He has a known history of bilateral BKA, S/P aneurysmectomy, hypertension, prediabetes and CKD with nephrotic syndrome. Patient is seen for evaluation. He wants a new wheelchair. The wheelchair that was given to him is too wide and wife states it's too heavy for her to lift. Current Outpatient Prescriptions   Medication Sig Dispense Refill    tamsulosin (FLOMAX) 0.4 mg capsule TAKE ONE CAPSULE BY MOUTH DAILY 90 Cap 3    furosemide (LASIX) 40 mg tablet Take 1 tablet every morning 30 Tab 11    benztropine (COGENTIN) 0.5 mg tablet 1 tablet qd 30 Tab 11    mirtazapine (REMERON) 15 mg tablet Take 1 Tab by mouth nightly.  27 Tab 11     Past Medical History:   Diagnosis Date    Amputation of both lower extremities (HCC)     tenderness and swollen left breeast    Aneurysm (HCC)     Conjunctivitis of right eye 08/30/2016    Hypertension     Pancreatic mass     Prediabetes     Renal failure     S/P colonoscopy 1-4-08    Wound, open      Past Surgical History:   Procedure Laterality Date    ABDOMEN SURGERY PROC UNLISTED      colonoscopy    CARDIAC SURG PROCEDURE UNLIST      HX ORTHOPAEDIC      bilateral amputation    HX UROLOGICAL      left orchiectomy    NEUROLOGICAL PROCEDURE UNLISTED  7/2004    aneursym     No Known Allergies      REVIEW OF SYSTEMS:  General: negative for - chills or fever  ENT: negative for - headaches, nasal congestion or tinnitus  Respiratory: negative for - cough, hemoptysis, shortness of breath or wheezing  Cardiovascular : negative for - chest pain, edema, palpitations or shortness of breath  Gastrointestinal: negative for - abdominal pain, blood in stools, heartburn or nausea/vomiting  Genito-Urinary: no dysuria, trouble voiding, or hematuria  Musculoskeletal: negative for - gait disturbance, joint pain, joint stiffness or joint swelling  Neurological: no TIA or stroke symptoms  Hematologic: no bruises, no bleeding, no swollen glands  Integument: no lumps, mole changes, nail changes or rash  Endocrine: no malaise/lethargy or unexpected weight changes      Social History     Social History    Marital status:      Spouse name: N/A    Number of children: N/A    Years of education: N/A     Social History Main Topics    Smoking status: Former Smoker    Smokeless tobacco: Never Used    Alcohol use Yes      Comment: ocassional    Drug use: No    Sexual activity: Yes     Partners: Female     Other Topics Concern    None     Social History Narrative     Family History   Problem Relation Age of Onset    No Known Problems Father        OBJECTIVE:    Visit Vitals    /78    Pulse 83    Temp 97.3 °F (36.3 °C) (Oral)    Resp 20    Ht 5' 7\" (1.702 m)    SpO2 96%     CONSTITUTIONAL: well , well nourished, appears age appropriate  EYES: perrla, eom intact  ENMT:moist mucous membranes, pharynx clear  NECK: supple. Thyroid normal  RESPIRATORY: Chest: clear bilaterally   CARDIOVASCULAR: Heart: regular rate and rhythm  GASTROINTESTINAL: Abdomen: soft, bowel sounds active  HEMATOLOGIC: no pathological lymph nodes palpated  MUSCULOSKELETAL: Extremities: no edema, pulse 1+   INTEGUMENT: No unusual rashes or suspicious skin lesions noted. Nails appear normal.  NEUROLOGIC: non-focal exam   MENTAL STATUS: alert and oriented, appropriate affect           ASSESSMENT:  1. CKD (chronic kidney disease), stage I    2. Nephrotic syndrome    3. Essential hypertension, benign    4. S/P cerebral aneurysm repair    5. Prediabetes    6. Screen for colon cancer    7. Disorder of cartilage     8.  Amputation of both lower extremities, subsequent encounter ([facility] [address] Will assess his renal function, as well as his proteinuria with appropriate lab studies today. We have a rather rohini discussion with him as to why his wife stopped sleeping with him five years ago. Reminded him he became psychotic. He doesn't remember the things he did, the things he said and the fact that he needed antipsychotic medications for a period of time and we mention all these things to him as a reason for his marital situation currently. We talked about leaving. We advised him he can't leave, he's not able to take care of himself. He requires assistance with ADLs. He'll return to the office in four months, sooner if he has any problems. PLAN:  .  Orders Placed This Encounter    AMB SUPPLY ORDER    HEPATITIS C AB    RENAL FUNCTION PANEL    PROT+CREATU (RANDOM)    VITAMIN D, 1, 25 DIHYDROXY    CBC WITH AUTOMATED DIFF    REFERRAL FOR COLONOSCOPY       Follow-up Disposition:  Return in about 4 months (around 8/25/2018). ATTENTION:   This medical record was transcribed using an electronic medical records system. Although proofread, it may and can contain electronic and spelling errors. Other human spelling and other errors may be present. Corrections may be executed at a later time. Please feel free to contact us for any clarifications as needed.

## 2018-04-25 NOTE — MR AVS SNAPSHOT
[address] [facility] 
 
 
 [address] [address] 
[phone] Patient: Shagufta Brock MRN: [id number] OQL:4/82/5866 Visit Information Date & Time Provider Department Dept. Phone Encounter #  
 4/25/2018  9:00 AM Avery Hernández 80 Sports Medicine and Primary Care [phone] [id number] Follow-up Instructions Return in about 4 months (around 8/25/2018). Follow-up and Disposition History Your Appointments 8/30/2018 12:00 PM  
Any with Ginna Teixeira MD  
[address] [place] [address] and Primary Care [address] [place] [address]) Appt Note: 4 MONTH FOLLOW UP  
 [address] Man[address] 1 [facility]  
  
   
 [address] [address] Upcoming Health Maintenance Date Due COLONOSCOPY 1/4/2018 Pneumococcal 65+ High/Highest Risk (2 of 2 - PPSV23) 4/25/2019* MEDICARE YEARLY EXAM 10/26/2018 GLAUCOMA SCREENING Q2Y 12/12/2018 DTaP/Tdap/Td series (2 - Td) 12/12/2026 *Topic was postponed. The date shown is not the original due date. Allergies as of 4/25/2018  Review Complete On: 4/25/2018 By: Ginna Teixeira MD  
 No Known Allergies Current Immunizations  Reviewed on 6/19/2011 Name Date Influenza High Dose Vaccine PF 10/25/2017 Not reviewed this visit You Were Diagnosed With   
  
 Codes Comments CKD (chronic kidney disease), stage I    -  Primary ICD-10-CM: N18.1 ICD-9-CM: 585.1 Nephrotic syndrome     ICD-10-CM: N04.9 ICD-9-CM: 581.9 Essential hypertension, benign     ICD-10-CM: I10 
ICD-9-CM: 401.1 S/P cerebral aneurysm repair     ICD-10-CM: Z98.890, Z86.79 
ICD-9-CM: V45.89 Prediabetes     ICD-10-CM: R73.03 
ICD-9-CM: 790.29 Screen for colon cancer     ICD-10-CM: Z12.11 ICD-9-CM: V76.51 Disorder of cartilage     ICD-10-CM: M94.9 ICD-9-CM: 733.90  Amputation of both lower extremities, subsequent encounter ([facility] [address] ICD-10-CM: T40.890X, V54.079V ICD-9-CM: V58.89, 897.6 Vitals BP Pulse Temp Resp Height(growth percentile) SpO2  
 120/78 83 97.3 °F (36.3 °C) (Oral) 20 5' 7\" (1.702 m) 96% Smoking Status Former Smoker Preferred Pharmacy Pharmacy Name Phone [facility] [address] [phone] Your Updated Medication List  
  
   
This list is accurate as of 4/25/18 11:59 AM.  Always use your most recent med list.  
  
  
  
  
 benztropine 0.5 mg tablet Commonly known as:  COGENTIN  
1 tablet qd  
  
 furosemide 40 mg tablet Commonly known as:  LASIX Take 1 tablet every morning  
  
 mirtazapine 15 mg tablet Commonly known as:  Josey Shanel Take 1 Tab by mouth nightly. tamsulosin 0.4 mg capsule Commonly known as:  FLOMAX TAKE ONE CAPSULE BY MOUTH DAILY We Performed the Following AMB SUPPLY ORDER [[id number] Custom] Comments:  
 Lightweight 14:preferrable w/c  
 CBC WITH AUTOMATED DIFF [id number] CPT(R)] HEPATITIS C AB [69930 CPT(R)] PROT+CREATU (RANDOM) [id number] CPT(R)] REFERRAL FOR COLONOSCOPY [MOB191 Custom] Comments:  
 Please evaluate patient for colon. RENAL FUNCTION PANEL [56214 CPT(R)] VITAMIN D, 1, 25 DIHYDROXY [23683 CPT(R)] Follow-up Instructions Return in about 4 months (around 8/25/2018). Referral Information Referral ID Referred By Referred To  
  
 [id number] Kristine Lester MD   
   [address] [facility] [address] Phone: [phone] Fax: [phone] Visits Status Start Date End Date 1 New Request 4/25/18 4/25/19 If your referral has a status of pending review or denied, additional information will be sent to support the outcome of this decision. Introducing [facility] & HEALTH SERVICES! [facility] introduces [employer] patient portal. Now you can access parts of your medical record, email your doctor's office, and request medication refills online. 1. In your internet browser, go to [email or web address] 2. Click on the First Time User? Click Here link in the Sign In box. You will see the New Member Sign Up page. 3. Enter your [employer] Access Code exactly as it appears below. You will not need to use this code after youve completed the sign-up process. If you do not sign up before the expiration date, you must request a new code. · [employer] Access Code: [email or web address] Expires: 7/24/2018 11:10 AM 
 
4. Enter the last four digits of your Social Security Number (xxxx) and Date of Birth (mm/dd/yyyy) as indicated and click Submit. You will be taken to the next sign-up page. 5. Create a [employer] ID. This will be your [employer] login ID and cannot be changed, so think of one that is secure and easy to remember. 6. Create a [employer] password. You can change your password at any time. 7. Enter your Password Reset Question and Answer. This can be used at a later time if you forget your password. 8. Enter your e-mail address. You will receive e-mail notification when new information is available in [address] 9. Click Sign Up. You can now view and download portions of your medical record. 10. Click the Download Summary menu link to download a portable copy of your medical information. If you have questions, please visit the Frequently Asked Questions section of the [employer] website. Remember, [employer] is NOT to be used for urgent needs. For medical emergencies, dial 911. Now available from your iPhone and Android! Please provide this summary of care documentation to your next provider. Your primary care clinician is listed as Nicolee Michael. If you have any questions after today's visit, please call [phone].

## 2018-04-26 LAB
1,25(OH)2D3 SERPL-MCNC: 40.7 PG/ML (ref 19.9–79.3)
ALBUMIN SERPL-MCNC: 4.1 G/DL (ref 3.6–4.8)
BASOPHILS # BLD AUTO: 0 X10E3/UL (ref 0–0.2)
BASOPHILS NFR BLD AUTO: 1 %
BUN SERPL-MCNC: 20 MG/DL (ref 8–27)
BUN/CREAT SERPL: 18 (ref 10–24)
CALCIUM SERPL-MCNC: 9.4 MG/DL (ref 8.6–10.2)
CHLORIDE SERPL-SCNC: 101 MMOL/L (ref 96–106)
CO2 SERPL-SCNC: 22 MMOL/L (ref 18–29)
CREAT SERPL-MCNC: 1.1 MG/DL (ref 0.76–1.27)
CREAT UR-MCNC: 123.9 MG/DL
EOSINOPHIL # BLD AUTO: 0.2 X10E3/UL (ref 0–0.4)
EOSINOPHIL NFR BLD AUTO: 5 %
ERYTHROCYTE [DISTWIDTH] IN BLOOD BY AUTOMATED COUNT: 16.6 % (ref 12.3–15.4)
GFR SERPLBLD CREATININE-BSD FMLA CKD-EPI: 70 ML/MIN/1.73
GFR SERPLBLD CREATININE-BSD FMLA CKD-EPI: 80 ML/MIN/1.73
GLUCOSE SERPL-MCNC: 97 MG/DL (ref 65–99)
HCT VFR BLD AUTO: 37.1 % (ref 37.5–51)
HCV AB S/CO SERPL IA: <0.1 S/CO RATIO (ref 0–0.9)
HGB BLD-MCNC: 12.3 G/DL (ref 13–17.7)
IMM GRANULOCYTES # BLD: 0 X10E3/UL (ref 0–0.1)
IMM GRANULOCYTES NFR BLD: 0 %
LYMPHOCYTES # BLD AUTO: 1.8 X10E3/UL (ref 0.7–3.1)
LYMPHOCYTES NFR BLD AUTO: 38 %
MCH RBC QN AUTO: 25 PG (ref 26.6–33)
MCHC RBC AUTO-ENTMCNC: 33.2 G/DL (ref 31.5–35.7)
MCV RBC AUTO: 75 FL (ref 79–97)
MONOCYTES # BLD AUTO: 0.4 X10E3/UL (ref 0.1–0.9)
MONOCYTES NFR BLD AUTO: 8 %
NEUTROPHILS # BLD AUTO: 2.2 X10E3/UL (ref 1.4–7)
NEUTROPHILS NFR BLD AUTO: 48 %
PHOSPHATE SERPL-MCNC: 2.8 MG/DL (ref 2.5–4.5)
PLATELET # BLD AUTO: 137 X10E3/UL (ref 150–379)
POTASSIUM SERPL-SCNC: 4.5 MMOL/L (ref 3.5–5.2)
PROT UR-MCNC: 16.9 MG/DL
PROT/CREAT UR: 136 MG/G CREAT (ref 0–200)
RBC # BLD AUTO: 4.92 X10E6/UL (ref 4.14–5.8)
SODIUM SERPL-SCNC: 141 MMOL/L (ref 134–144)
WBC # BLD AUTO: 4.6 X10E3/UL (ref 3.4–10.8)

## 2018-06-25 ENCOUNTER — OFFICE VISIT (OUTPATIENT)
Dept: INTERNAL MEDICINE CLINIC | Age: 68
End: 2018-06-25

## 2018-06-25 VITALS
BODY MASS INDEX: 12.82 KG/M2 | RESPIRATION RATE: 16 BRPM | HEIGHT: 67 IN | OXYGEN SATURATION: 96 % | DIASTOLIC BLOOD PRESSURE: 74 MMHG | SYSTOLIC BLOOD PRESSURE: 119 MMHG | TEMPERATURE: 97.4 F | WEIGHT: 81.7 LBS | HEART RATE: 97 BPM

## 2018-06-25 DIAGNOSIS — S88.911D AMPUTATION OF BOTH LOWER EXTREMITIES, SUBSEQUENT ENCOUNTER (HCC): ICD-10-CM

## 2018-06-25 DIAGNOSIS — N18.30 STAGE 3 CHRONIC KIDNEY DISEASE (HCC): ICD-10-CM

## 2018-06-25 DIAGNOSIS — S88.912D AMPUTATION OF BOTH LOWER EXTREMITIES, SUBSEQUENT ENCOUNTER (HCC): ICD-10-CM

## 2018-06-25 DIAGNOSIS — R44.3 HALLUCINATION: Primary | ICD-10-CM

## 2018-06-25 DIAGNOSIS — N04.9 NEPHROTIC SYNDROME: ICD-10-CM

## 2018-06-25 DIAGNOSIS — I82.220: ICD-10-CM

## 2018-06-25 DIAGNOSIS — I10 ESSENTIAL HYPERTENSION: ICD-10-CM

## 2018-06-25 RX ORDER — RISPERIDONE 0.25 MG/1
TABLET, FILM COATED ORAL
Qty: 90 TAB | Refills: 3 | Status: SHIPPED | OUTPATIENT
Start: 2018-06-25 | End: 2018-08-20 | Stop reason: SDUPTHER

## 2018-06-25 RX ORDER — RISPERIDONE 0.25 MG/1
0.25 TABLET, FILM COATED ORAL
Qty: 30 TAB | Refills: 3 | Status: SHIPPED | OUTPATIENT
Start: 2018-06-25 | End: 2018-06-25 | Stop reason: SDUPTHER

## 2018-06-25 NOTE — MR AVS SNAPSHOT
[address] [facility] 
 
 
 [address] [address] 
[phone] Patient: Shagufta Brock MRN: [id number] HIRAL:[birth year] Visit Information Date & Time Provider Department Dept. Phone Encounter #  
 6/25/2018  9:15 AM Avery Hernández Sports Medicine and Primary Care [phone] [id number] Follow-up Instructions Return in about 6 weeks (around 8/6/2018). Follow-up and Disposition History Your Appointments 8/30/2018 12:00 PM  
Any with Ginna Teixeira MD  
[address] [place] [address] and Primary Care [facility]) Appt Note: 4 MONTH FOLLOW UP  
 [address] [facility]  
  
   
 [address] [address] Upcoming Health Maintenance Date Due COLONOSCOPY 1/4/2018 Pneumococcal 65+ High/Highest Risk (2 of 2 - PPSV23) 4/25/2019* Influenza Age 5 to Adult 8/1/2018 MEDICARE YEARLY EXAM 10/26/2018 GLAUCOMA SCREENING Q2Y 12/12/2018 DTaP/Tdap/Td series (2 - Td) 12/12/2026 *Topic was postponed. The date shown is not the original due date. Allergies as of 6/25/2018  Review Complete On: 6/25/2018 By: Ginna Teixeira MD  
 No Known Allergies Current Immunizations  Reviewed on 6/19/2011 Name Date Influenza High Dose Vaccine PF 10/25/2017 Not reviewed this visit You Were Diagnosed With   
  
 Codes Comments Hallucination    -  Primary ICD-10-CM: R44.3 ICD-9-CM: 780.1 Inferior vena cava embolism (HCC)     ICD-10-CM: S72.870 ICD-9-CM: 453.2 Amputation of both lower extremities, subsequent encounter [facility])     ICD-10-CM: Q96.911A, W77.929F ICD-9-CM: V58.89, 897.6 Stage 3 chronic kidney disease     ICD-10-CM: N18.3 ICD-9-CM: 289. 3 Nephrotic syndrome     ICD-10-CM: N04.9 ICD-9-CM: 581.9 Essential hypertension     ICD-10-CM: I10 
ICD-9-CM: 401.9 Vitals BP Pulse Temp Resp Height(growth percentile) Weight(growth percentile) 119/74 97 97.4 °F (36.3 °C) (Oral) 16 5' 7\" (1.702 m) 81 lb 11.2 oz (37.1 kg) SpO2 BMI Smoking Status 96% 12.8 kg/m2 Former Smoker Vitals History BMI and BSA Data Body Mass Index Body Surface Area  
 12.8 kg/m 2 1.32 m 2 Preferred Pharmacy Pharmacy Name Phone [facility] [address] [place] [address] [phone] Your Updated Medication List  
  
   
This list is accurate as of 6/25/18 10:30 AM.  Always use your most recent med list.  
  
  
  
  
 benztropine 0.5 mg tablet Commonly known as:  COGENTIN  
1 tablet qd  
  
 furosemide 40 mg tablet Commonly known as:  LASIX Take 1 tablet every morning  
  
 mirtazapine 15 mg tablet Commonly known as:  Velna Dries Take 1 Tab by mouth nightly. risperiDONE 0.25 mg tablet Commonly known as:  RisperDAL Take 1 Tab by mouth nightly. tamsulosin 0.4 mg capsule Commonly known as:  FLOMAX TAKE ONE CAPSULE BY MOUTH DAILY Prescriptions Sent to Pharmacy Refills  
 risperiDONE (RISPERDAL) 0.25 mg tablet 3 Sig: Take 1 Tab by mouth nightly. Class: Normal  
 Pharmacy: [facility] [address] [place] [address] Ph #: [phone] Route: Oral  
  
We Performed the Following AMMONIA [id number] CPT(R)] CBC WITH AUTOMATED DIFF [49689 CPT(R)] CULTURE, URINE [id number] CPT(R)] METABOLIC PANEL, BASIC [42367 CPT(R)] REFERRAL TO PSYCHIATRY [REF91 Custom] Comments: PLEASE SCHEDULE WITH DR Rosey Hammond  
 TSH 3RD GENERATION [45145 CPT(R)] URINALYSIS W/ RFLX MICROSCOPIC [25583 CPT(R)] VITAMIN B12 & FOLATE [61701 CPT(R)] Follow-up Instructions Return in about 6 weeks (around 8/6/2018). To-Do List   
 06/25/2018 Imaging:  CT HEAD WO CONT Referral Information Referral ID Referred By Referred To  
  
 [id number] Leeanna VILLALOBOS Not Available Visits Status Start Date End Date 1 New Request 6/25/18 6/25/19 If your referral has a status of pending review or denied, additional information will be sent to support the outcome of this decision. Introducing [facility] & HEALTH SERVICES! [facility] introduces [employer] patient portal. Now you can access parts of your medical record, email your doctor's office, and request medication refills online. 1. In your internet browser, go to [email or web address] 2. Click on the First Time User? Click Here link in the Sign In box. You will see the New Member Sign Up page. 3. Enter your [employer] Access Code exactly as it appears below. You will not need to use this code after youve completed the sign-up process. If you do not sign up before the expiration date, you must request a new code. · [employer] Access Code: [email or web address] Expires: 7/24/2018 11:10 AM 
 
4. Enter the last four digits of your Social Security Number (xxxx) and Date of Birth (mm/dd/yyyy) as indicated and click Submit. You will be taken to the next sign-up page. 5. Create a [employer] ID. This will be your [employer] login ID and cannot be changed, so think of one that is secure and easy to remember. 6. Create a [employer] password. You can change your password at any time. 7. Enter your Password Reset Question and Answer. This can be used at a later time if you forget your password. 8. Enter your e-mail address. You will receive e-mail notification when new information is available in [address] 9. Click Sign Up. You can now view and download portions of your medical record. 10. Click the Download Summary menu link to download a portable copy of your medical information.  
 
If you have questions, please visit the Frequently Asked Questions section of the [facility] ([employer]). Remember, [employer] is NOT to be used for urgent needs. For medical emergencies, dial 911. Now available from your iPhone and Android! Please provide this summary of care documentation to your next provider. Your primary care clinician is listed as Melchor Marin. If you have any questions after today's visit, please call [phone].

## 2018-06-25 NOTE — ASSESSMENT & PLAN NOTE
Stable, based on history, physical exam and review of pertinent labs, studies and medications; meds reconciled; continue current treatment plan.   Lab Results   Component Value Date/Time    WBC 4.6 04/25/2018 11:44 AM    HGB 12.3 04/25/2018 11:44 AM    HCT 37.1 04/25/2018 11:44 AM    PLATELET 842 58/26/4857 11:44 AM    Creatinine 1.10 04/25/2018 11:44 AM    BUN 20 04/25/2018 11:44 AM    Potassium 4.5 04/25/2018 11:44 AM

## 2018-06-25 NOTE — ASSESSMENT & PLAN NOTE
Stable, based on history, physical exam and review of pertinent labs, studies and medications; meds reconciled; continue current treatment plan.     Lab Results   Component Value Date/Time    WBC 4.6 04/25/2018 11:44 AM    HGB 12.3 04/25/2018 11:44 AM    HCT 37.1 04/25/2018 11:44 AM    PLATELET 474 91/85/5654 11:44 AM    Creatinine 1.10 04/25/2018 11:44 AM    BUN 20 04/25/2018 11:44 AM    Cholesterol, total 182 10/25/2017 11:40 AM    HDL Cholesterol 72 10/25/2017 11:40 AM    LDL, calculated 102 10/25/2017 11:40 AM    Triglyceride 42 10/25/2017 11:40 AM

## 2018-06-25 NOTE — PROGRESS NOTES
SPORTS MEDICINE AND PRIMARY CARE  Kleber Cardoza MD, [address] [address]  Phone:  [phone]  Fax: [phone]       Chief Complaint   Patient presents with    Chronic Kidney Disease     follow up    . SUBJECTIVE:    Gaurang Fraga is a 80 y.o. male Patient returns today with known history of bilateral amputation lower extremities, S/P aneurysm, hypertension, , prediabetes, and is seen for evaluation. His wife called us Friday, concerned about hallucinations, and significant change in behavior, similar to behavior he had before he had his amputations. Lab studies are reviewed and we note mild anemia, but otherwise unremarkable and reviewed with patient . He is scheduled to see Dr. Tom Brenner for a colonoscopy on April 2nd. Patient admitted to some episodes of hallucinations. His wife described an episode where he became very paranoid and demanded she come back home. He states he doesn't recall the episode. She relates hallucinations during the day, but particularly in the mornings. He doesn't recall the hallucination she mentioned, but does admit to some hallucinations. He goes to bed with the TV on and gets upset with the wife if she tries to turn it off . Patient is seen for evaluation. Current Outpatient Prescriptions   Medication Sig Dispense Refill    risperiDONE (RISPERDAL) 0.25 mg tablet Take 1 Tab by mouth nightly. 30 Tab 3    tamsulosin (FLOMAX) 0.4 mg capsule TAKE ONE CAPSULE BY MOUTH DAILY 90 Cap 3    furosemide (LASIX) 40 mg tablet Take 1 tablet every morning 30 Tab 11    benztropine (COGENTIN) 0.5 mg tablet 1 tablet qd 30 Tab 11    mirtazapine (REMERON) 15 mg tablet Take 1 Tab by mouth nightly.  27 Tab 11     Past Medical History:   Diagnosis Date    Amputation of both lower extremities (HCC)     tenderness and swollen left breeast    Aneurysm (HCC)     Conjunctivitis of right eye 08/30/2016    Hallucination     Hypertension  Pancreatic mass     Prediabetes     Renal failure     S/P colonoscopy 1-4-08    Wound, open      Past Surgical History:   Procedure Laterality Date    ABDOMEN SURGERY PROC UNLISTED      colonoscopy    CARDIAC SURG PROCEDURE UNLIST      HX ORTHOPAEDIC      bilateral amputation    HX UROLOGICAL      left orchiectomy    NEUROLOGICAL PROCEDURE UNLISTED  7/2004    aneursym     No Known Allergies      REVIEW OF SYSTEMS:  General: negative for - chills or fever  ENT: negative for - headaches, nasal congestion or tinnitus  Respiratory: negative for - cough, hemoptysis, shortness of breath or wheezing  Cardiovascular : negative for - chest pain, edema, palpitations or shortness of breath  Gastrointestinal: negative for - abdominal pain, blood in stools, heartburn or nausea/vomiting  Genito-Urinary: no dysuria, trouble voiding, or hematuria  Musculoskeletal: negative for - gait disturbance, joint pain, joint stiffness or joint swelling  Neurological: no TIA or stroke symptoms  Hematologic: no bruises, no bleeding, no swollen glands  Integument: no lumps, mole changes, nail changes or rash  Endocrine: no malaise/lethargy or unexpected weight changes      Social History     Social History    Marital status:      Spouse name: N/A    Number of children: N/A    Years of education: N/A     Social History Main Topics    Smoking status: Former Smoker    Smokeless tobacco: Never Used    Alcohol use Yes      Comment: ocassional    Drug use: No    Sexual activity: Yes     Partners: Female     Other Topics Concern    None     Social History Narrative     Family History   Problem Relation Age of Onset    No Known Problems Father        OBJECTIVE:    Visit Vitals    /74    Pulse 97    Temp 97.4 °F (36.3 °C) (Oral)    Resp 16    Ht 5' 7\" (1.702 m)    Wt 81 lb 11.2 oz (37.1 kg)    SpO2 96%    BMI 12.8 kg/m2     CONSTITUTIONAL: well , well nourished, appears age appropriate  EYES: perrla, eom intact  ENMT:moist mucous membranes, pharynx clear  NECK: supple. Thyroid normal  RESPIRATORY: Chest: clear bilaterally   CARDIOVASCULAR: Heart: regular rate and rhythm  GASTROINTESTINAL: Abdomen: soft, bowel sounds active  HEMATOLOGIC: no pathological lymph nodes palpated  MUSCULOSKELETAL: Extremities: no edema, pulse 1+   INTEGUMENT: No unusual rashes or suspicious skin lesions noted. Nails appear normal.  NEUROLOGIC: non-focal exam   MENTAL STATUS: alert and oriented, appropriate affect           ASSESSMENT:  1. Hallucination    2. Inferior vena cava embolism (HCC)    3. Amputation of both lower extremities, subsequent encounter ([facility] [address]    4. Stage 3 chronic kidney disease    5. Nephrotic syndrome    6. Essential hypertension      I'm concerned about his change in mental status. Will check a UA and urine C&S to rule out other pathology. Will place him on a small dose of Risperdal to take at bedtime to see if we can subside or stop hallucinations. Will ask him to see a psychiatrist for psychiatric input. We asked him to stop watching TV for the 2-3 hours before he goes to sleep and not to sleep with the TV on. We encouraged him to keep his follow up appointment with Dr. Yvonne Nix for a colonoscopy. Encouraged fluid intake regarding his renal status. BP control is at goal.    He'll return to see us in about six weeks, sooner if the hallucinations do not stop. We ask his wife to call us if he continues to have hallucinations. PLAN:  .  Orders Placed This Encounter    CULTURE, URINE    CT HEAD WO CONT    URINALYSIS W/ RFLX MICROSCOPIC    VITAMIN B12 & FOLATE    AMMONIA    TSH 3RD GENERATION    METABOLIC PANEL, BASIC    CBC WITH AUTOMATED DIFF    REFERRAL TO PSYCHIATRY    risperiDONE (RISPERDAL) 0.25 mg tablet       Follow-up Disposition:  Return in about 6 weeks (around 8/6/2018). ATTENTION:   This medical record was transcribed using an electronic medical records system.   Although proofread, it may and can contain electronic and spelling errors. Other human spelling and other errors may be present. Corrections may be executed at a later time. Please feel free to contact us for any clarifications as needed.

## 2018-06-25 NOTE — PROGRESS NOTES
Chief Complaint   Patient presents with    Chronic Kidney Disease     follow up      1. Have you been to the ER, urgent care clinic since your last visit? Hospitalized since your last visit? No    2. Have you seen or consulted any other health care providers outside of the [facility] since your last visit? Include any pap smears or colon screening.  No

## 2018-06-26 LAB
APPEARANCE UR: CLEAR
BACTERIA #/AREA URNS HPF: NORMAL /[HPF]
BASOPHILS # BLD AUTO: 0.1 X10E3/UL (ref 0–0.2)
BASOPHILS NFR BLD AUTO: 2 %
BILIRUB UR QL STRIP: NEGATIVE
BUN SERPL-MCNC: 17 MG/DL (ref 8–27)
BUN/CREAT SERPL: 13 (ref 10–24)
CALCIUM SERPL-MCNC: 10.3 MG/DL (ref 8.6–10.2)
CASTS URNS QL MICRO: NORMAL /LPF
CHLORIDE SERPL-SCNC: 102 MMOL/L (ref 96–106)
CO2 SERPL-SCNC: 22 MMOL/L (ref 20–29)
COLOR UR: YELLOW
CREAT SERPL-MCNC: 1.26 MG/DL (ref 0.76–1.27)
EOSINOPHIL # BLD AUTO: 0.3 X10E3/UL (ref 0–0.4)
EOSINOPHIL NFR BLD AUTO: 7 %
EPI CELLS #/AREA URNS HPF: NORMAL /HPF
ERYTHROCYTE [DISTWIDTH] IN BLOOD BY AUTOMATED COUNT: 16.3 % (ref 12.3–15.4)
FOLATE SERPL-MCNC: 13.4 NG/ML
GFR SERPLBLD CREATININE-BSD FMLA CKD-EPI: 59 ML/MIN/1.73
GFR SERPLBLD CREATININE-BSD FMLA CKD-EPI: 68 ML/MIN/1.73
GLUCOSE SERPL-MCNC: 103 MG/DL (ref 65–99)
GLUCOSE UR QL: NEGATIVE
HCT VFR BLD AUTO: 39 % (ref 37.5–51)
HGB BLD-MCNC: 13 G/DL (ref 13–17.7)
HGB UR QL STRIP: NEGATIVE
IMM GRANULOCYTES # BLD: 0 X10E3/UL (ref 0–0.1)
IMM GRANULOCYTES NFR BLD: 0 %
KETONES UR QL STRIP: NEGATIVE
LEUKOCYTE ESTERASE UR QL STRIP: ABNORMAL
LYMPHOCYTES # BLD AUTO: 1.7 X10E3/UL (ref 0.7–3.1)
LYMPHOCYTES NFR BLD AUTO: 37 %
MCH RBC QN AUTO: 25.2 PG (ref 26.6–33)
MCHC RBC AUTO-ENTMCNC: 33.3 G/DL (ref 31.5–35.7)
MCV RBC AUTO: 76 FL (ref 79–97)
MICRO URNS: ABNORMAL
MONOCYTES # BLD AUTO: 0.3 X10E3/UL (ref 0.1–0.9)
MONOCYTES NFR BLD AUTO: 7 %
MUCOUS THREADS URNS QL MICRO: PRESENT
NEUTROPHILS # BLD AUTO: 2.2 X10E3/UL (ref 1.4–7)
NEUTROPHILS NFR BLD AUTO: 47 %
NITRITE UR QL STRIP: NEGATIVE
PH UR STRIP: 6 [PH] (ref 5–7.5)
PLATELET # BLD AUTO: 158 X10E3/UL (ref 150–379)
POTASSIUM SERPL-SCNC: 5.3 MMOL/L (ref 3.5–5.2)
PROT UR QL STRIP: NEGATIVE
RBC # BLD AUTO: 5.16 X10E6/UL (ref 4.14–5.8)
RBC #/AREA URNS HPF: NORMAL /HPF
SODIUM SERPL-SCNC: 141 MMOL/L (ref 134–144)
SP GR UR: 1.02 (ref 1–1.03)
TSH SERPL DL<=0.005 MIU/L-ACNC: 1.03 UIU/ML (ref 0.45–4.5)
UROBILINOGEN UR STRIP-MCNC: 1 MG/DL (ref 0.2–1)
VIT B12 SERPL-MCNC: 617 PG/ML (ref 232–1245)
WBC # BLD AUTO: 4.6 X10E3/UL (ref 3.4–10.8)
WBC #/AREA URNS HPF: NORMAL /HPF

## 2018-06-27 LAB — BACTERIA UR CULT: NORMAL

## 2018-06-28 ENCOUNTER — HOSPITAL ENCOUNTER (OUTPATIENT)
Dept: CT IMAGING | Age: 68
Discharge: HOME | End: 2018-06-28
Attending: INTERNAL MEDICINE
Payer: MEDICARE

## 2018-06-28 DIAGNOSIS — R44.3 HALLUCINATION: ICD-10-CM

## 2018-06-28 PROCEDURE — 70450 CT HEAD/BRAIN W/O DYE: CPT

## 2018-08-06 ENCOUNTER — OFFICE VISIT (OUTPATIENT)
Dept: INTERNAL MEDICINE CLINIC | Age: 68
End: 2018-08-06

## 2018-08-06 VITALS
SYSTOLIC BLOOD PRESSURE: 110 MMHG | RESPIRATION RATE: 18 BRPM | TEMPERATURE: 97.7 F | HEART RATE: 90 BPM | OXYGEN SATURATION: 95 % | HEIGHT: 67 IN | DIASTOLIC BLOOD PRESSURE: 69 MMHG

## 2018-08-06 DIAGNOSIS — E88.09 HYPOALBUMINEMIA: ICD-10-CM

## 2018-08-06 DIAGNOSIS — N04.9 NEPHROTIC SYNDROME: ICD-10-CM

## 2018-08-06 DIAGNOSIS — I10 ESSENTIAL HYPERTENSION: ICD-10-CM

## 2018-08-06 DIAGNOSIS — N18.30 STAGE 3 CHRONIC KIDNEY DISEASE (HCC): Primary | ICD-10-CM

## 2018-08-06 RX ORDER — TAMSULOSIN HYDROCHLORIDE 0.4 MG/1
CAPSULE ORAL
Qty: 90 CAP | Refills: 3 | Status: SHIPPED | OUTPATIENT
Start: 2018-08-06 | End: 2019-08-12 | Stop reason: SDUPTHER

## 2018-08-06 NOTE — MR AVS SNAPSHOT
[address] [place] [address] 
 
 
 [address] [id number] 
[phone] Patient: Morales Thrasher MRN: [id number] PXV:[id number]/38/[id number] Visit Information Date & Time Provider Department Dept. Phone Encounter #  
 8/6/2018  9:00 AM [facility] H[facility] [phone] [id number] Follow-up Instructions Return in about 3 months (around 11/6/2018). Follow-up and Disposition History Your Appointments 8/9/2018 11:00 AM  
New Patient with Georges Badillo NP Ramses Layton 5 ([facility]) Appt Note: New Patient referred by Dr. Evie Ramsay for Hallucination, appt made by wife Alea Atkinson, she was told arriving time 10:30am([facility]) [address] [facility] [address] [place] [address] Avenue  
[phone] [address] [place] [address] [address] Upcoming Health Maintenance Date Due COLONOSCOPY 1/4/2018 Influenza Age 5 to Adult 8/1/2018 Pneumococcal 65+ High/Highest Risk (2 of 2 - PPSV23) 4/25/2019* MEDICARE YEARLY EXAM 10/26/2018 GLAUCOMA SCREENING Q2Y 12/12/2018 DTaP/Tdap/Td series (2 - Td) 12/12/2026 *Topic was postponed. The date shown is not the original due date. Allergies as of 8/6/2018  Review Complete On: 8/6/2018 By: Demetrio Dong LPN No Known Allergies Current Immunizations  Reviewed on 6/19/2011 Name Date Influenza High Dose Vaccine PF 10/25/2017 Not reviewed this visit You Were Diagnosed With   
  
 Codes Comments Stage 3 chronic kidney disease    -  Primary ICD-10-CM: N18.3 ICD-9-CM: 437. 3 Essential hypertension     ICD-10-CM: I10 
ICD-9-CM: 401.9 Nephrotic syndrome     ICD-10-CM: N04.9 ICD-9-CM: 581.9 Hypoalbuminemia     ICD-10-CM: E88.09 
ICD-9-CM: 273.8 Vitals BP Pulse Temp Resp Height(growth percentile) SpO2  
 110/69 90 97.7 °F (36.5 °C) (Oral) 18 5' 7\" (1.702 m) 95% Smoking Status Former Smoker Vitals History Preferred Pharmacy Pharmacy Name Phone [facility] [address], [facility] [address] [phone] Your Updated Medication List  
  
   
This list is accurate as of 8/6/18 10:16 AM.  Always use your most recent med list.  
  
  
  
  
 benztropine 0.5 mg tablet Commonly known as:  COGENTIN  
1 tablet qd  
  
 furosemide 40 mg tablet Commonly known as:  LASIX Take 1 tablet every morning  
  
 mirtazapine 15 mg tablet Commonly known as:  Ardath Kem Take 1 Tab by mouth nightly. risperiDONE 0.25 mg tablet Commonly known as:  RisperDAL TAKE 1 TABLET BY MOUTH EVERY NIGHT  
  
 tamsulosin 0.4 mg capsule Commonly known as:  FLOMAX TAKE ONE CAPSULE BY MOUTH DAILY Prescriptions Printed Refills  
 tamsulosin (FLOMAX) 0.4 mg capsule 3 Sig: TAKE ONE CAPSULE BY MOUTH DAILY Class: Print We Performed the Following AMB SUPPLY ORDER [[id number] Custom] Comments:  
 New liner for prosthesis METABOLIC PANEL, BASIC [94167 CPT(R)] Follow-up Instructions Return in about 3 months (around 11/6/2018). Introducing [facility]! [employer] introduces [facility] patient portal. Now you can access parts of your medical record, email your doctor's office, and request medication refills online. 1. In your internet browser, go to [email or web address] 2. Click on the First Time User? Click Here link in the Sign In box. You will see the New Member Sign Up page. 3. Enter your [facility] Access Code exactly as it appears below. You will not need to use this code after youve completed the sign-up process. If you do not sign up before the expiration date, you must request a new code. · [facility] Access Code: [email or web address] Expires: 11/4/2018 10:16 AM 
 
 4. Enter the last four digits of your Social Security Number (xxxx) and Date of Birth (mm/dd/yyyy) as indicated and click Submit. You will be taken to the next sign-up page. 5. Create a Central Desktop ID. This will be your Central Desktop login ID and cannot be changed, so think of one that is secure and easy to remember. 6. Create a Central Desktop password. You can change your password at any time. 7. Enter your Password Reset Question and Answer. This can be used at a later time if you forget your password. 8. Enter your e-mail address. You will receive e-mail notification when new information is available in [address] 9. Click Sign Up. You can now view and download portions of your medical record. 10. Click the Download Summary menu link to download a portable copy of your medical information. If you have questions, please visit the Frequently Asked Questions section of the Central Desktop website. Remember, Central Desktop is NOT to be used for urgent needs. For medical emergencies, dial 911. Now available from your iPhone and Android! Please provide this summary of care documentation to your next provider. Your primary care clinician is listed as Jennifer Neil. If you have any questions after today's visit, please call [phone].

## 2018-08-06 NOTE — PROGRESS NOTES
1. Have you been to the ER, urgent care clinic since your last visit? Hospitalized since your last visit? No    2. Have you seen or consulted any other health care providers outside of the [facility] since your last visit? Include any pap smears or colon screening.  No

## 2018-08-06 NOTE — PROGRESS NOTES
SPORTS MEDICINE AND PRIMARY CARE  Bekah Newell MD, [address] [address]  Phone:  [phone]  Fax: [phone]       Chief Complaint   Patient presents with    Hypertension     f/u   . SUBJECTIVE:    Landy Sosa is a 80 y.o. male  Dictation on: 08/06/2018  9:52 AM by: Segun Manzano [[id number]]    Dictation on: 08/06/2018  9:58 AM by: Taun Ferguson              Current Outpatient Prescriptions   Medication Sig Dispense Refill    tamsulosin (FLOMAX) 0.4 mg capsule TAKE ONE CAPSULE BY MOUTH DAILY 90 Cap 3    risperiDONE (RISPERDAL) 0.25 mg tablet TAKE 1 TABLET BY MOUTH EVERY NIGHT 90 Tab 3    furosemide (LASIX) 40 mg tablet Take 1 tablet every morning 30 Tab 11    benztropine (COGENTIN) 0.5 mg tablet 1 tablet qd 30 Tab 11    mirtazapine (REMERON) 15 mg tablet Take 1 Tab by mouth nightly.  27 Tab 11     Past Medical History:   Diagnosis Date    Amputation of both lower extremities (HCC)     tenderness and swollen left breeast    Aneurysm (HCC)     Conjunctivitis of right eye 08/30/2016    Hallucination     Hypertension     Pancreatic mass     Prediabetes     Renal failure     S/P colonoscopy 1-4-08    UTI (urinary tract infection)     Wound, open      Past Surgical History:   Procedure Laterality Date    ABDOMEN SURGERY PROC UNLISTED      colonoscopy    CARDIAC SURG PROCEDURE UNLIST      HX ORTHOPAEDIC      bilateral amputation    HX UROLOGICAL      left orchiectomy    NEUROLOGICAL PROCEDURE UNLISTED  7/2004    aneursym     No Known Allergies      REVIEW OF SYSTEMS:  General: negative for - chills or fever  ENT: negative for - headaches, nasal congestion or tinnitus  Respiratory: negative for - cough, hemoptysis, shortness of breath or wheezing  Cardiovascular : negative for - chest pain, edema, palpitations or shortness of breath  Gastrointestinal: negative for - abdominal pain, blood in stools, heartburn or nausea/vomiting  Genito-Urinary: no dysuria, trouble voiding, or hematuria  Musculoskeletal: negative for - gait disturbance, joint pain, joint stiffness or joint swelling  Neurological: no TIA or stroke symptoms  Hematologic: no bruises, no bleeding, no swollen glands  Integument: no lumps, mole changes, nail changes or rash  Endocrine: no malaise/lethargy or unexpected weight changes      Social History     Social History    Marital status:      Spouse name: N/A    Number of children: N/A    Years of education: N/A     Social History Main Topics    Smoking status: Former Smoker    Smokeless tobacco: Never Used    Alcohol use Yes      Comment: ocassional    Drug use: No    Sexual activity: Yes     Partners: Female     Other Topics Concern    None     Social History Narrative     Family History   Problem Relation Age of Onset    No Known Problems Father        OBJECTIVE:    Visit Vitals    /69    Pulse 90    Temp 97.7 °F (36.5 °C) (Oral)    Resp 18    Ht 5' 7\" (1.702 m)    SpO2 95%     CONSTITUTIONAL: well , well nourished, appears age appropriate  EYES: perrla, eom intact  ENMT:moist mucous membranes, pharynx clear  NECK: supple. Thyroid normal  RESPIRATORY: Chest: clear bilaterally   CARDIOVASCULAR: Heart: regular rate and rhythm  GASTROINTESTINAL: Abdomen: soft, bowel sounds active  HEMATOLOGIC: no pathological lymph nodes palpated  MUSCULOSKELETAL: Extremities: no edema, pulse 1+   INTEGUMENT: No unusual rashes or suspicious skin lesions noted. Nails appear normal.  NEUROLOGIC: non-focal exam   MENTAL STATUS: alert and oriented, appropriate affect           ASSESSMENT:  1. Stage 3 chronic kidney disease    2. Essential hypertension    3. Nephrotic syndrome    4. Hypoalbuminemia       Dictation on: 08/06/2018 10:06 AM by: Jose Elias Nichols       I have discussed the diagnosis with the patient and the intended plan as seen in the  orders above. The patient understands and agees with the plan.   The patient has   received an after visit summary and questions were answered concerning  future plans  Patient labs and/or xrays were reviewed  Past records were reviewed. PLAN:  .  Orders Placed This Encounter    AMB SUPPLY ORDER    METABOLIC PANEL, BASIC    tamsulosin (FLOMAX) 0.4 mg capsule       Follow-up Disposition:  Return in about 3 months (around 11/6/2018). ATTENTION:   This medical record was transcribed using an electronic medical records system. Although proofread, it may and can contain electronic and spelling errors. Other human spelling and other errors may be present. Corrections may be executed at a later time. Please feel free to contact us for any clarifications as needed.

## 2018-08-07 ENCOUNTER — HOSPITAL ENCOUNTER (OUTPATIENT)
Age: 68
Setting detail: OUTPATIENT SURGERY
Discharge: HOME | End: 2018-08-07
Attending: INTERNAL MEDICINE | Admitting: INTERNAL MEDICINE
Payer: MEDICARE

## 2018-08-07 ENCOUNTER — ANESTHESIA EVENT (OUTPATIENT)
Dept: SURGERY | Age: 68
End: 2018-08-07
Payer: MEDICARE

## 2018-08-07 ENCOUNTER — ANESTHESIA (OUTPATIENT)
Dept: SURGERY | Age: 68
End: 2018-08-07
Payer: MEDICARE

## 2018-08-07 VITALS
HEART RATE: 89 BPM | DIASTOLIC BLOOD PRESSURE: 97 MMHG | WEIGHT: 81 LBS | OXYGEN SATURATION: 100 % | BODY MASS INDEX: 12.69 KG/M2 | RESPIRATION RATE: 22 BRPM | TEMPERATURE: 97.6 F | SYSTOLIC BLOOD PRESSURE: 123 MMHG

## 2018-08-07 LAB
BUN SERPL-MCNC: 20 MG/DL (ref 8–27)
BUN/CREAT SERPL: 19 (ref 10–24)
CALCIUM SERPL-MCNC: 9.4 MG/DL (ref 8.6–10.2)
CHLORIDE SERPL-SCNC: 103 MMOL/L (ref 96–106)
CO2 SERPL-SCNC: 23 MMOL/L (ref 20–29)
CREAT SERPL-MCNC: 1.06 MG/DL (ref 0.76–1.27)
GLUCOSE SERPL-MCNC: 92 MG/DL (ref 65–99)
POTASSIUM SERPL-SCNC: 4.7 MMOL/L (ref 3.5–5.2)
SODIUM SERPL-SCNC: 141 MMOL/L (ref 134–144)

## 2018-08-07 PROCEDURE — 74011250636 HC RX REV CODE- 250/636: Performed by: ANESTHESIOLOGY

## 2018-08-07 PROCEDURE — 77030013992 HC SNR POLYP ENDOSC BSC -B: Performed by: INTERNAL MEDICINE

## 2018-08-07 PROCEDURE — 88305 TISSUE EXAM BY PATHOLOGIST: CPT | Performed by: INTERNAL MEDICINE

## 2018-08-07 PROCEDURE — 76060000032 HC ANESTHESIA 0.5 TO 1 HR: Performed by: INTERNAL MEDICINE

## 2018-08-07 PROCEDURE — 74011250637 HC RX REV CODE- 250/637: Performed by: INTERNAL MEDICINE

## 2018-08-07 PROCEDURE — 76040000007: Performed by: INTERNAL MEDICINE

## 2018-08-07 PROCEDURE — 74011250636 HC RX REV CODE- 250/636

## 2018-08-07 RX ORDER — FENTANYL CITRATE 50 UG/ML
25 INJECTION, SOLUTION INTRAMUSCULAR; INTRAVENOUS
Status: DISCONTINUED | OUTPATIENT
Start: 2018-08-07 | End: 2018-08-07 | Stop reason: HOSPADM

## 2018-08-07 RX ORDER — SODIUM CHLORIDE, SODIUM LACTATE, POTASSIUM CHLORIDE, CALCIUM CHLORIDE 600; 310; 30; 20 MG/100ML; MG/100ML; MG/100ML; MG/100ML
50 INJECTION, SOLUTION INTRAVENOUS CONTINUOUS
Status: DISCONTINUED | OUTPATIENT
Start: 2018-08-07 | End: 2018-08-07 | Stop reason: HOSPADM

## 2018-08-07 RX ORDER — LIDOCAINE HYDROCHLORIDE 20 MG/ML
INJECTION, SOLUTION INFILTRATION; PERINEURAL AS NEEDED
Status: DISCONTINUED | OUTPATIENT
Start: 2018-08-07 | End: 2018-08-07 | Stop reason: HOSPADM

## 2018-08-07 RX ORDER — DEXTROMETHORPHAN/PSEUDOEPHED 2.5-7.5/.8
DROPS ORAL
Status: DISCONTINUED
Start: 2018-08-07 | End: 2018-08-07 | Stop reason: HOSPADM

## 2018-08-07 RX ORDER — EPINEPHRINE 0.1 MG/ML
1 INJECTION INTRACARDIAC; INTRAVENOUS
Status: DISCONTINUED | OUTPATIENT
Start: 2018-08-07 | End: 2018-08-07 | Stop reason: HOSPADM

## 2018-08-07 RX ORDER — ATROPINE SULFATE 0.1 MG/ML
0.5 INJECTION INTRAVENOUS
Status: DISCONTINUED | OUTPATIENT
Start: 2018-08-07 | End: 2018-08-07 | Stop reason: HOSPADM

## 2018-08-07 RX ORDER — SODIUM CHLORIDE 0.9 % (FLUSH) 0.9 %
5-10 SYRINGE (ML) INJECTION AS NEEDED
Status: DISCONTINUED | OUTPATIENT
Start: 2018-08-07 | End: 2018-08-07 | Stop reason: HOSPADM

## 2018-08-07 RX ORDER — SODIUM CHLORIDE 0.9 % (FLUSH) 0.9 %
5-10 SYRINGE (ML) INJECTION EVERY 8 HOURS
Status: CANCELLED | OUTPATIENT
Start: 2018-08-07 | End: 2018-08-07

## 2018-08-07 RX ORDER — HYDROMORPHONE HYDROCHLORIDE 1 MG/ML
0.5 INJECTION, SOLUTION INTRAMUSCULAR; INTRAVENOUS; SUBCUTANEOUS
Status: DISCONTINUED | OUTPATIENT
Start: 2018-08-07 | End: 2018-08-07 | Stop reason: HOSPADM

## 2018-08-07 RX ORDER — LIDOCAINE HYDROCHLORIDE 10 MG/ML
0.1 INJECTION, SOLUTION EPIDURAL; INFILTRATION; INTRACAUDAL; PERINEURAL AS NEEDED
Status: DISCONTINUED | OUTPATIENT
Start: 2018-08-07 | End: 2018-08-07 | Stop reason: HOSPADM

## 2018-08-07 RX ORDER — SODIUM CHLORIDE 0.9 % (FLUSH) 0.9 %
5-10 SYRINGE (ML) INJECTION EVERY 8 HOURS
Status: DISCONTINUED | OUTPATIENT
Start: 2018-08-07 | End: 2018-08-07 | Stop reason: HOSPADM

## 2018-08-07 RX ORDER — PROPOFOL 10 MG/ML
INJECTION, EMULSION INTRAVENOUS AS NEEDED
Status: DISCONTINUED | OUTPATIENT
Start: 2018-08-07 | End: 2018-08-07 | Stop reason: HOSPADM

## 2018-08-07 RX ORDER — NALOXONE HYDROCHLORIDE 0.4 MG/ML
0.4 INJECTION, SOLUTION INTRAMUSCULAR; INTRAVENOUS; SUBCUTANEOUS
Status: DISCONTINUED | OUTPATIENT
Start: 2018-08-07 | End: 2018-08-07 | Stop reason: HOSPADM

## 2018-08-07 RX ORDER — LIDOCAINE HYDROCHLORIDE 20 MG/ML
5 SOLUTION OROPHARYNGEAL AS NEEDED
Status: DISCONTINUED | OUTPATIENT
Start: 2018-08-07 | End: 2018-08-07 | Stop reason: HOSPADM

## 2018-08-07 RX ORDER — DEXTROMETHORPHAN/PSEUDOEPHED 2.5-7.5/.8
1.2 DROPS ORAL
Status: DISCONTINUED | OUTPATIENT
Start: 2018-08-07 | End: 2018-08-07 | Stop reason: HOSPADM

## 2018-08-07 RX ORDER — FLUMAZENIL 0.1 MG/ML
0.2 INJECTION INTRAVENOUS
Status: DISCONTINUED | OUTPATIENT
Start: 2018-08-07 | End: 2018-08-07 | Stop reason: HOSPADM

## 2018-08-07 RX ORDER — DEXTROSE MONOHYDRATE AND SODIUM CHLORIDE 5; .9 G/100ML; G/100ML
100 INJECTION, SOLUTION INTRAVENOUS CONTINUOUS
Status: CANCELLED | OUTPATIENT
Start: 2018-08-07 | End: 2018-08-07

## 2018-08-07 RX ORDER — SODIUM CHLORIDE 9 MG/ML
50 INJECTION, SOLUTION INTRAVENOUS CONTINUOUS
Status: DISCONTINUED | OUTPATIENT
Start: 2018-08-07 | End: 2018-08-07 | Stop reason: HOSPADM

## 2018-08-07 RX ORDER — DIPHENHYDRAMINE HYDROCHLORIDE 50 MG/ML
50 INJECTION, SOLUTION INTRAMUSCULAR; INTRAVENOUS ONCE
Status: DISCONTINUED | OUTPATIENT
Start: 2018-08-07 | End: 2018-08-07 | Stop reason: HOSPADM

## 2018-08-07 RX ORDER — MIDAZOLAM HYDROCHLORIDE 1 MG/ML
5 INJECTION, SOLUTION INTRAMUSCULAR; INTRAVENOUS
Status: DISCONTINUED | OUTPATIENT
Start: 2018-08-07 | End: 2018-08-07 | Stop reason: HOSPADM

## 2018-08-07 RX ORDER — FENTANYL CITRATE 50 UG/ML
100 INJECTION, SOLUTION INTRAMUSCULAR; INTRAVENOUS ONCE
Status: DISCONTINUED | OUTPATIENT
Start: 2018-08-07 | End: 2018-08-07 | Stop reason: HOSPADM

## 2018-08-07 RX ORDER — SODIUM CHLORIDE 9 MG/ML
100 INJECTION, SOLUTION INTRAVENOUS CONTINUOUS
Status: CANCELLED | OUTPATIENT
Start: 2018-08-07 | End: 2018-08-07

## 2018-08-07 RX ORDER — LORAZEPAM 2 MG/ML
2 INJECTION INTRAMUSCULAR AS NEEDED
Status: DISCONTINUED | OUTPATIENT
Start: 2018-08-07 | End: 2018-08-07 | Stop reason: HOSPADM

## 2018-08-07 RX ORDER — SODIUM CHLORIDE 0.9 % (FLUSH) 0.9 %
5-10 SYRINGE (ML) INJECTION AS NEEDED
Status: CANCELLED | OUTPATIENT
Start: 2018-08-07 | End: 2018-08-07

## 2018-08-07 RX ADMIN — PROPOFOL 10 MG: 10 INJECTION, EMULSION INTRAVENOUS at 10:22

## 2018-08-07 RX ADMIN — PROPOFOL 10 MG: 10 INJECTION, EMULSION INTRAVENOUS at 10:08

## 2018-08-07 RX ADMIN — PROPOFOL 10 MG: 10 INJECTION, EMULSION INTRAVENOUS at 10:10

## 2018-08-07 RX ADMIN — PROPOFOL 10 MG: 10 INJECTION, EMULSION INTRAVENOUS at 10:18

## 2018-08-07 RX ADMIN — PROPOFOL 10 MG: 10 INJECTION, EMULSION INTRAVENOUS at 10:13

## 2018-08-07 RX ADMIN — PROPOFOL 10 MG: 10 INJECTION, EMULSION INTRAVENOUS at 10:16

## 2018-08-07 RX ADMIN — PROPOFOL 80 MG: 10 INJECTION, EMULSION INTRAVENOUS at 10:07

## 2018-08-07 RX ADMIN — PROPOFOL 10 MG: 10 INJECTION, EMULSION INTRAVENOUS at 10:12

## 2018-08-07 RX ADMIN — PROPOFOL 10 MG: 10 INJECTION, EMULSION INTRAVENOUS at 10:20

## 2018-08-07 RX ADMIN — PROPOFOL 10 MG: 10 INJECTION, EMULSION INTRAVENOUS at 10:09

## 2018-08-07 RX ADMIN — PROPOFOL 10 MG: 10 INJECTION, EMULSION INTRAVENOUS at 10:11

## 2018-08-07 RX ADMIN — LIDOCAINE HYDROCHLORIDE 60 MG: 20 INJECTION, SOLUTION INFILTRATION; PERINEURAL at 10:07

## 2018-08-07 RX ADMIN — SODIUM CHLORIDE: 900 INJECTION, SOLUTION INTRAVENOUS at 09:25

## 2018-08-07 RX ADMIN — PROPOFOL 10 MG: 10 INJECTION, EMULSION INTRAVENOUS at 10:14

## 2018-08-07 NOTE — DISCHARGE INSTRUCTIONS
Endoscopy Discharge Instructions     Dr. Latisha Devi office                                            NAME: Odalis Oneil RECORD [id number]    AGE:  80 y.o. YOB: 1950                                                              FINAL Discharge Procedure and Diagnosis:       Procedure(s):  COLONOSCOPY       FINDINGS:     Polyp removed   hemorrhoids                                        MEDICATIONS    [x] CONTINUE CURRENT MEDICATIONS     [] NEW MEDICATIONS           1.    2.    3.         Testing   Schedule              Colonoscopy Screening                                   Recommendations       []  Repeat colonoscopy in 6-12 month 2nd        to Inadequate  prep    []  Repeat colonoscopy in 3 years    [x]  Repeat colonoscopy in 5 years    []  Repeat colonoscopy in 10 years         New additional  Tests  Call the office   ([phone]) for the appointment time      []      []      []                                     YOUR NEXT APPOINTMENT WITH DR Anette Moreno:                                                                                                                                [x]   None follow up with pcp   []  1 week       []   2 week    []  1 month    Always keep Vanessa Boucher MD for regular medical follow up                                                                                                                         If you had a colonoscopy the \"C\" indicates specific instructions        x                                           Diet Instructions :   Ordinarily you may resume your previous diet but your initial diet should be       Light your discharge nurse will go over this with you. Large meals can cause  abdominal discomfort after these procedures.                                                                            Specific Diet Recommendations: [x] High fiber diet. https://www.[employer] com/diets/        [] GERD diet: avoid fried and fatty foods, peppermint, chocolate, alcohol,               coffee, citrus fruits and juices, and tomato products. Avoid lying down for            2 to 3  hours after eating. https://www.[employer]iss.[employer]/. com/diets/            []  FODMAP DIET  DeathUnit.nl              []  All diets eg high fiber, gastroparesis. , weight loss , gluten free             1. [employer]              2.  https://www.[employer] Walkbase/diets/           __x__  [place] may feel quite tired and need to rest and recuperate for several hours    following these procedures. __x__  Due to the fact that sedation was administered for this procedure, do not drive,   operate machinery or sign legal documents for the next 24 hours. __x__  Mild abdominal pain may be experienced after your procedure, but is should   disappear after several hours. Notify your physician if you have persistent pain,   tenderness or abdominal distension. __x__  C    Many patients for the first few hours following the exam may experience         belching or passing gas through the rectum. Walking may help to relieve        distention and gas pains. A warm bath or shower will often help with abdominal  cramping.                                                                                            __x__   [place] may return to your normal routine tomorrow, according to how you feel        and depending on your doctors instructions. Be sure to call your doctor to make  an appointment for a post-surgery check-up on the date your doctor has   requested. __x__ C     Rectal bleeding or spotting in small amounts may occur with the first bowel   movement following a colonoscopy or sigmoidoscopy.  If a large amount of blood is noted call immediately     __x__  You may experience a numbness or lack of sensation in throat. If present, do not     eat or drink. Before eating, test your ability to drink with small sips of water. Y     You may try clear liquids or soups. If you tolerate these, you may then eat solid     food which is not greasy or spicy. __x__ C     IF POLYPS REMOVED: Avoid any blood thinning medication such as plavix,   aspirin or coumadin  NSAIDS (like advil or alleve) for 7 days. __x__  Notify your physician if you cough or vomit blood or experience chest pain. Your biopsy or testing result should be available in 7-10 days                                                                                                                      Prescription will be electronically sent to your pharmacy you must     let your nurse know your pharmacy:                                                                                                                                          [address] [place] [address]. TO HELP ENSURE A SMOOTH RECOVERY,       IT IS IMPORTANT TO FOLLOW THEM. _x___Pamphlet /Educational Information provided for diagnostic findings     Additional education information can assessed at the sites below:   Larissa   http://www.digestive. niddk.nih.gov/ddiseases/a-z.asp      Web MD patient information                                                                                                Signature of individual given instructions :   Date: 8/7/2018                                                                                                                                DISCHARGE SUMMARY from Nurse    PATIENT INSTRUCTIONS:    After general anesthesia or intravenous sedation, for 24 hours or while taking prescription Narcotics:  · Limit your activities  · Do not drive and operate hazardous machinery  · Do not make important personal or business decisions  · Do  not drink alcoholic beverages  · If you have not urinated within 8 hours after discharge, please contact your surgeon on call. Report the following to your surgeon:  · Excessive pain, swelling, redness or odor of or around the surgical area  · Temperature over 100.5  · Nausea and vomiting lasting longer than 4 hours or if unable to take medications  · Any signs of decreased circulation or nerve impairment to extremity: change in color, persistent  numbness, tingling, coldness or increase pain  · Any questions      *  Please give a list of your current medications to your Primary Care Provider. *  Please update this list whenever your medications are discontinued, doses are      changed, or new medications (including over-the-counter products) are added. *  Please carry medication information at all times in case of emergency situations. These are general instructions for a healthy lifestyle:    No smoking/ No tobacco products/ Avoid exposure to second hand smoke  Surgeon General's Warning:  Quitting smoking now greatly reduces serious risk to your health. Obesity, smoking, and sedentary lifestyle greatly increases your risk for illness    A healthy diet, regular physical exercise & weight monitoring are important for maintaining a healthy lifestyle    You may be retaining fluid if you have a history of heart failure or if you experience any of the following symptoms:  Weight gain of 3 pounds or more overnight or 5 pounds in a week, increased swelling in our hands or feet or shortness of breath while lying flat in bed. Please call your doctor as soon as you notice any of these symptoms; do not wait until your next office visit.     Recognize signs and symptoms of STROKE:    F-face looks uneven    A-arms unable to move or move unevenly    S-speech slurred or non-existent    T-time-call 911 as soon as signs and symptoms begin-DO NOT go       Back to bed or wait to see if you get better-TIME IS BRAIN. Warning Signs of HEART ATTACK     Call 911 if you have these symptoms:   Chest discomfort. Most heart attacks involve discomfort in the center of the chest that lasts more than a few minutes, or that goes away and comes back. It can feel like uncomfortable pressure, squeezing, fullness, or pain.  Discomfort in other areas of the upper body. Symptoms can include pain or discomfort in one or both arms, the back, neck, jaw, or stomach.  Shortness of breath with or without chest discomfort.  Other signs may include breaking out in a cold sweat, nausea, or lightheadedness. Don't wait more than five minutes to call 911 - MINUTES MATTER! Fast action can save your life. Calling 911 is almost always the fastest way to get lifesaving treatment. Emergency Medical Services staff can begin treatment when they arrive -- up to an hour sooner than if someone gets to the hospital by car. The discharge information has been reviewed with the patient and spouse. The patient and spouse verbalized understanding. Discharge medications reviewed with the patient and spouse and appropriate educational materials and side effects teaching were provided.   ___________________________________________________________________________________________________________________________________

## 2018-08-07 NOTE — IP AVS SNAPSHOT
[address] [facility] 
 
 
 [address] Patient: Antonio Raphael MRN: [id number] QXM:3/30/6209 About your hospitalization You were admitted on:  August 7, 2018 You last received care in the:  [facility] PACU/[facility] You were discharged on:  August 7, 2018 Why you were hospitalized Your primary diagnosis was:  Not on File Follow-up Information None Your Scheduled Appointments Monday August 20, 2018 11:00 AM EDT New Patient with ISRAEL Crawford. Jarronda 5 ([facility]) 217 [facility] [address] [place] [address]  
[phone] Discharge Orders None A check katlin indicates which time of day the medication should be taken. My Medications ASK your doctor about these medications Instructions Each Dose to Equal  
 Morning Noon Evening Bedtime  
 benztropine 0.5 mg tablet Commonly known as:  COGENTIN Your last dose was: Your next dose is:    
   
   
 1 tablet qd  
     
   
   
   
  
 furosemide 40 mg tablet Commonly known as:  LASIX Your last dose was: Your next dose is: Take 1 tablet every morning  
     
   
   
   
  
 mirtazapine 15 mg tablet Commonly known as:  Mohan Sitter Your last dose was: Your next dose is: Take 1 Tab by mouth nightly. 15 mg  
    
   
   
   
  
 risperiDONE 0.25 mg tablet Commonly known as:  RisperDAL Your last dose was: Your next dose is: TAKE 1 TABLET BY MOUTH EVERY NIGHT  
     
   
   
   
  
 tamsulosin 0.4 mg capsule Commonly known as:  FLOMAX Your last dose was: Your next dose is: TAKE ONE CAPSULE BY MOUTH DAILY Discharge Instructions Endoscopy Discharge Instructions Dr. Shanae Fregoso [facility] office  NAME: Juju Ply RECORD ASJZPH:[id number] AGE:  80 y.o. YOB: 1950 FINAL Discharge Procedure and Diagnosis:   
  
Procedure(s): 
COLONOSCOPY FINDINGS:    
Polyp removed  
hemorrhoids MEDICATIONS [x] CONTINUE CURRENT MEDICATIONS [] NEW MEDICATIONS 1.  
 2.  
 3.  
   
 
Testing Schedule Colonoscopy Screening                                   Recommendations 
 
   []  Repeat colonoscopy in 6-12 month 2nd        to Inadequate  prep  
 []  Repeat colonoscopy in 3 years  
 [x]  Repeat colonoscopy in 5 years  
 []  Repeat colonoscopy in 10 years New additional 
Tests Call the office  
([phone]) for the appointment time    
 []  
 
 []  
 
 []  
  
  
   
  
                     [address] [address]   
                                                                                                                 
 
 
    
 [x]   None follow up with pcp []  1 week     
 []   2 week  
 []  1 month Always keep Jesse Remy MD for regular medical follow up If you had a colonoscopy the \"C\" indicates specific instructions    
  
x                                           Diet Instructions : 
 Ordinarily you may resume your previous diet but your initial diet should be       Light your discharge nurse will go over this with you. Large meals can cause  abdominal discomfort after these procedures. Specific Diet Recommendations:  
     [x] High fiber diet. https://www.Availendar.com/. com/diets/ 
 
 [] GERD diet: avoid fried and fatty foods, peppermint, chocolate, alcohol,    
          coffee, citrus fruits and juices, and tomato products. Avoid lying down for 
          2 to 3  hours after eating. https://www.weiss.com/. com/diets/      
     []  FODMAP DIET  DeathUnit.nl      
 
     []  All diets eg high fiber, gastroparesis. , weight loss , gluten free 1. [employer] 2.  https://www.[facility]. com/diets/  
       
__x__  Pecola Bidding may feel quite tired and need to rest and recuperate for several hours    following these procedures. __x__  Due to the fact that sedation was administered for this procedure, do not drive,   operate machinery or sign legal documents for the next 24 hours. __x__  Mild abdominal pain may be experienced after your procedure, but is should   disappear after several hours. Notify your physician if you have persistent pain,   tenderness or abdominal distension. __x__  C Many patients for the first few hours following the exam may experience         belching or passing gas through the rectum. Walking may help to relieve      
 distention and gas pains. A warm bath or shower will often help with abdominal  cramping.                                                                                        
  
__x__   Pecola Bidding may return to your normal routine tomorrow, according to how you feel        and depending on your doctors instructions. Be sure to call your doctor to make  an appointment for a post-surgery check-up on the date your doctor has   requested. __x__ C Rectal bleeding or spotting in small amounts may occur with the first bowel   movement following a colonoscopy or sigmoidoscopy. If a large amount of blood is noted call immediately __x__  Krystyna Nagy may experience a numbness or lack of sensation in throat. If present, do not   
 eat or drink. Before eating, test your ability to drink with small sips of water. Y     You may try clear liquids or soups. If you tolerate these, you may then eat solid     food which is not greasy or spicy. __x__ C   
 IF POLYPS REMOVED: Avoid any blood thinning medication such as plavix,   aspirin or coumadin  NSAIDS (like advil or alleve) for 7 days. __x__  Notify your physician if you cough or vomit blood or experience chest pain. Your biopsy or testing result should be available in 7-10 days Prescription will be electronically sent to your pharmacy you must  
  let your nurse know your pharmacy:  
                                                                                                                               
 
     [address] TO HELP ENSURE A SMOOTH RECOVERY,  
    IT IS IMPORTANT TO FOLLOW THEM. _x___Pamphlet /Educational Information provided for diagnostic findings Additional education information can assessed at the sites below: 
 Larissa 
 http://www.digestive. niddk.nih.gov/ddiseases/a-z.asp Web MD patient information Signature of individual given instructions : 
 Date: 8/7/2018 DISCHARGE SUMMARY from Nurse PATIENT INSTRUCTIONS: 
 
 
F-face looks uneven A-arms unable to move or move unevenly S-speech slurred or non-existent T-time-call 911 as soon as signs and symptoms begin-DO NOT go  
 Back to bed or wait to see if you get better-TIME IS BRAIN. Warning Signs of HEART ATTACK Call 911 if you have these symptoms: 
? Chest discomfort. Most heart attacks involve discomfort in the center of the chest that lasts more than a few minutes, or that goes away and comes back. It can feel like uncomfortable pressure, squeezing, fullness, or pain. ? Discomfort in other areas of the upper body. Symptoms can include pain or discomfort in one or both arms, the back, neck, jaw, or stomach. ? Shortness of breath with or without chest discomfort. ? Other signs may include breaking out in a cold sweat, nausea, or lightheadedness. Don't wait more than five minutes to call [address]! Fast action can save your life. Calling 911 is almost always the fastest way to get lifesaving treatment. Emergency Medical Services staff can begin treatment when they arrive  up to an hour sooner than if someone gets to the hospital by car. The discharge information has been reviewed with the patient and spouse. The patient and spouse verbalized understanding. Discharge medications reviewed with the patient and spouse and appropriate educational materials and side effects teaching were provided. ___________________________________________________________________________________________________________________________________ ACO Transitions of Care [facility] offers a voluntary care coordination program to provide high quality service and care to [facility] fee-for-service beneficiaries. Emiliana Pratt was designed to help you enhance your health and well-being through the following services: ? Transitions of Care  support for individuals who are transitioning from one care setting to another (example: Hospital to home). ? Chronic and Complex Care Coordination  support for individuals and caregivers of those with serious or chronic illnesses or with more than one chronic (ongoing) condition and those who take a number of different medications. If you meet specific medical criteria, a [facility] [address] may call you directly to coordinate your care with your primary care physician and your other care providers. For questions about the [facility] programs, please, contact your physicians office. For general questions or additional information about Accountable Care Organizations: 
Please visit www.medicare.gov/acos. html or call 1-800-MEDICARE (4-491.341.5750) TTY users should call 7-671.982.6565. Introducing [facility]! [facility] introduces [employer] patient portal. Now you can access parts of your medical record, email your doctor's office, and request medication refills online. 1. In your internet browser, go to [email or web address] 2. Click on the First Time User? Click Here link in the Sign In box. You will see the New Member Sign Up page. 3. Enter your [employer] Access Code exactly as it appears below. You will not need to use this code after youve completed the sign-up process. If you do not sign up before the expiration date, you must request a new code. · [employer] Access Code: [email or web address] Expires: 11/4/2018 10:16 AM 
 
4. Enter the last four digits of your Social Security Number (xxxx) and Date of Birth (mm/dd/yyyy) as indicated and click Submit. You will be taken to the next sign-up page. 5. Create a Touchbaset ID. This will be your [employer] login ID and cannot be changed, so think of one that is secure and easy to remember. 6. Create a [employer] password. You can change your password at any time. 7. Enter your Password Reset Question and Answer. This can be used at a later time if you forget your password. 8. Enter your e-mail address. You will receive e-mail notification when new information is available in [address] 9. Click Sign Up. You can now view and download portions of your medical record. 10. Click the Download Summary menu link to download a portable copy of your medical information. If you have questions, please visit the Frequently Asked Questions section of the [employer] website. Remember, R&L is NOT to be used for urgent needs. For medical emergencies, dial 911. Now available from your iPhone and Android! Introducing Master Shelby As a Lizzie Genna patient, I wanted to make you aware of our electronic visit tool called Master Shelby. Lizzie Thinkspeed 24/7 allows you to connect within minutes with a medical provider 24 hours a day, seven days a week via a mobile device or tablet or logging into a secure website from your computer. You can access Master Shelby from anywhere in the [place]. A virtual visit might be right for you when you have a simple condition and feel like you just dont want to get out of bed, or cant get away from work for an appointment, when your regular [facility] provider is not available (evenings, weekends or holidays), or when youre out of town and need minor care. Electronic visits cost only $49 and if the Lizzie Thinkspeed 24/7 provider determines a prescription is needed to treat your condition, one can be electronically transmitted to a nearby pharmacy*. Please take a moment to enroll today if you have not already done so. The enrollment process is free and takes just a few minutes. To enroll, please download the Integrated Ordering Systems 24/Biomeasure alexia to your tablet or phone, or visit [email or web address] to enroll on your computer.    
And, as an [address] [place] [address] patient with a TradingScreen account, the results of your visits will be scanned into your electronic medical record and your primary care provider will be able to view the scanned results. We urge you to continue to see your regular [facility] provider for your ongoing medical care. And while your primary care provider may not be the one available when you seek a Master Kalyanolya virtual visit, the peace of mind you get from getting a real diagnosis real time can be priceless. For more information on Master Biggseronfin, view our Frequently Asked Questions (FAQs) at [email or web address]. Sincerely, 
 
Radha Kline MD 
Chief Medical Officer [employer] *:  certain medications cannot be prescribed via Master Shelby Providers Seen During Your Hospitalization Provider Specialty Primary office phone Miguel A Whitlock MD Internal Medicine [phone] Your Primary Care Physician (PCP) Primary Care Physician Office Phone Office Fax Jane  [phone] You are allergic to the following No active allergies Recent Documentation Weight BMI Smoking Status 36.7 kg 12.69 kg/m2 Former Smoker Emergency Contacts Name Discharge Info Relation Home Work [place] Camila Salazar DISCHARGE CAREGIVER [3] Spouse [3] [phone] Patient Belongings The following personal items are in your possession at time of discharge: 
  Dental Appliances: None  Visual Aid: Glasses (reading) Please provide this summary of care documentation to your next provider. Signatures-by signing, you are acknowledging that this After Visit Summary has been reviewed with you and you have received a copy. Patient Signature:  ____________________________________________________________ Date:  ____________________________________________________________  
  
[facility] Signature:  ____________________________________________________________ Date:  ____________________________________________________________

## 2018-08-07 NOTE — H&P
G I Procedure Note           Endoscopy History and Physical               Dr. Benites Nose Office [address] [place] [address] Office  Esperanza Degroot [id number]  [id number]    1950  80 y.o.  male      Date of Procedure:   Preoperative Diagnosis:       Procedure:   8/7/2018           SCREENING                              Procedure(s):  COLONOSCOPY      Gastroenterologist:  Anesthesia:           Dewey Nunez MD                               MAC            History and procedure indication:  Governor Starkey is a 80 y.o. BLACK OR  male who presents with: SCREENING   including the additional history of Screening ,Screening for colon cancer,,        Past Medical History:   Diagnosis Date    Amputation of both lower extremities ([facility] [address]     tenderness and swollen left breeast    Aneurysm ([facility] [address]     Conjunctivitis of right eye 08/30/2016    Gangrene ([facility] [address] 2011    bilateral amputation d/t gangrene    Hallucination     Hypertension     Pancreatic mass     Prediabetes     Renal failure     S/P colonoscopy 1-4-08    UTI (urinary tract infection)     Wound, open       Prior to Admission medications    Medication Sig Start Date End Date Taking? Authorizing Provider   tamsulosin (FLOMAX) 0.4 mg capsule TAKE ONE CAPSULE BY MOUTH DAILY 8/6/18   Narcisa Stewart MD   risperiDONE (RISPERDAL) 0.25 mg tablet TAKE 1 TABLET BY MOUTH EVERY NIGHT 6/25/18   Narcisa Stewart MD   furosemide (LASIX) 40 mg tablet Take 1 tablet every morning 5/3/17   Narcisa Stewart MD   benztropine (COGENTIN) 0.5 mg tablet 1 tablet qd 3/31/15   Narcisa Stewart MD   mirtazapine (REMERON) 15 mg tablet Take 1 Tab by mouth nightly.  2/23/15   Narcisa Stewart MD     No Known Allergies    Past Surgical History:   Procedure Laterality Date    ABDOMEN SURGERY PROC UNLISTED      colonoscopy    CARDIAC SURG PROCEDURE UNLIST  HX ORTHOPAEDIC      bilateral amputation    HX UROLOGICAL      left orchiectomy    NEUROLOGICAL PROCEDURE UNLISTED  7/2004    aneursym     Family History   Problem Relation Age of Onset    No Known Problems Father       Social History   Substance Use Topics    Smoking status: Former Smoker     Quit date: 8/7/2000    Smokeless tobacco: Never Used    Alcohol use Yes      Comment: ocassional                                                      PHYSICAL EXAM     Visit Vitals    BP (!) 131/101    Pulse 81    Temp 97.7 °F (36.5 °C)    Resp 20    Wt 36.7 kg (81 lb)    SpO2 99%    BMI 12.69 kg/m2       General appearance:  alert, well appearing, and in no distress  Mental status:  normal mood, behavior, speech, dress, motor activity and thought processes  Nose:      normal and patent, no erythema, discharge or polyps  Mouth:- mucous membranes moist, pharynx normal without lesions                  [x]  No Loose teeth      []    Loose teeth  Finger opening:  []1     []1.5    [] 2     [] 2.5     [x] 3      [] 3.5     [] 4   Mallampati:         [] Class 1     [x] Class 2    [] Class 3      [] Class 4      Neck - supple,      [x] Full ROM [] Decreased ROM  [] Short Neck no significant adenopathy    Chest - clear to auscultation, no wheezes, rales or rhonchi, symmetric air entry  Heart: normal rate, regular rhythm, normal S1, S2, no murmurs, rubs, clicks or gallops  Abdomen: abdomen soft, bowel sounds  [x] normal  [] increased  [] hypoactive                       [] no tenderness  [] epigastric tenderness  [] LLQ tenderness   [] RLQ tenderness                      No masses, organomegaly or guarding. Rectal exam: negative without mass, lesions or tenderness  Extremities: peripheral pulses normal, no pedal edema, no clubbing or cyanosis  Neurologic: Alert and oriented to person, place, and time; normal strength and tone.                          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 Date: 7 24 18                   Pre Procedure Evaluation (per anesthesia or per h&p)                                                Sedation/Assessment:                                                                                               Mallampati Classification                            []Class 1                    []Class 2                    [] Class 3                  [] Class 4                                              ASA classfication         []     Class I: Normally healthy         []     Class II: Patient with mild systemic disease (e.g. hypertension)         []     Class III: Patient with severe systemic disease (e.g. CHF), non-decompensated         []     Class IV: Patient with severe systemic disease, decompensated         []     Class V: Moribund patient, survival unlikely                     Plan:  []  Egd                                 [x] Colonoscopy                               [] with Moderate Sedation /Conscious Sedation                                 [x] MAC          Patient stable for planned procedure. See orders.      Perico Csatillo MD

## 2018-08-07 NOTE — PERIOP NOTES
Handoff Report from Operating Room to PACU    Report received from Yonatan Broussard RN and Dr. Stephane Granados regarding Lonzell Ply. Surgeon(s):  Derek Lal MD  And Procedure(s) (LRB):  COLONOSCOPY (N/A)  POLYPECTOMY (N/A)  confirmed   with allergies discussed. Anesthesia type, drugs, patient history, complications, estimated blood loss, vital signs, intake and output, and blood products received, last pain medication, lines, reversal medications and temperature were reviewed.

## 2018-08-07 NOTE — PERIOP NOTES
Discharge instructions explained to patient and pt's wife, pt taken via wheelchair out of PACU, pt stable for discharge

## 2018-08-07 NOTE — IP AVS SNAPSHOT
[address] [facility] 
 
 
 [address] [facility] Patient: Helga Cosby MRN: [id number] [facility]:8/56/2270 A check katlin indicates which time of day the medication should be taken. My Medications ASK your doctor about these medications Instructions Each Dose to Equal  
 Morning Noon Evening Bedtime  
 benztropine 0.5 mg tablet Commonly known as:  COGENTIN Your last dose was: Your next dose is:    
   
   
 1 tablet qd  
     
   
   
   
  
 furosemide 40 mg tablet Commonly known as:  LASIX Your last dose was: Your next dose is: Take 1 tablet every morning  
     
   
   
   
  
 mirtazapine 15 mg tablet Commonly known as:  Dimitry Sow Your last dose was: Your next dose is: Take 1 Tab by mouth nightly. 15 mg  
    
   
   
   
  
 risperiDONE 0.25 mg tablet Commonly known as:  RisperDAL Your last dose was: Your next dose is: TAKE 1 TABLET BY MOUTH EVERY NIGHT  
     
   
   
   
  
 tamsulosin 0.4 mg capsule Commonly known as:  FLOMAX Your last dose was: Your next dose is: TAKE ONE CAPSULE BY MOUTH DAILY

## 2018-08-07 NOTE — PROCEDURES
G I Procedure Note            COLONOSCOPY   Dr. Krysta Muir office   [facility]    [address] [facility], [place]                                   [id number]                                  [id number]   1950                                      80 y.o.                                    male      Procedure Date: 8/7/2018                                                                                                              Pre Op Diagnosis:                     1. SCREENING                                                                                                                                                                          Post Op Diagnosis:                    1.  5mm and  1 cm sigmoid polyp removed  HEMORRHOIDS                                                           2.      3.               H&p completed: Yes            Anesthesia Assessment: Performed prior to procedure:      No change  Anesthesia Plan: Performed prior to procedure:                   No change       Medications: See Reviewed List and Reconcilation           Informed consent was obtained     Risk Statement:  Prior to the procedure the risks were explained to the patient and/or to the family including but not limited to perforation, bleeding, adverse drug reaction, aspiration, and even the need for possible surgery. A colonoscopy exam is not 100% accurate which may be related to preparation or blind spots during the exam.The possibility that an abnormality and /or cancer could be missed was also discussed as well as alternative x-ray options.          Instrument:    Olympus adult Videocolonoscope                                   Immediate Procedure Reassessment Completed     With the patient in the left lateral position, a rectal examination was performed and the findings were: negative without mass, lesions or tenderness   The Olympus Video colonoscope was inserted under direct vision into the rectum. The colonoscope was passed from the rectum to the cecum, which was identified by the ileocecal valve. The colon findings demonstrated:  ANUS: Anal exam reveals no masses or external hemorrhoids, sphincter tone is normal.   RECTUM: Rectal exam reveals no masses  . SIGMOID COLON: The mucosa is normal with good vascular pattern and without ulcers, diverticula, and polyps. DESCENDING COLON: The mucosa is normal with good vascular pattern and without ulcers, diverticula, and polyps. SPLENIC FLEXURE: The splenic flexure is normal.   TRANSVERSE COLON: The mucosa is normal with good vascular pattern and without ulcers, diverticula, and polyps. HEPATIC FLEXURE: The hepatic flexure is normal.   ASCENDING COLON: The mucosa is normal with good vascular pattern and without ulcers, diverticula, and polyps. CECUM:  The ileocecal valve appears normal.   TERMINAL ILEUM: The terminal ileum was not entered. A       A polyp was identified. 5 mm in size, located in the rectosigmoid removed by snare cautery and retrieved for pathology  10 mm in size, located in the rectosigmoid removed by snare cautery and retrieved for pathology The polypectomy site was reviewed and was free of hemorrhage. The colonoscope was slowly withdrawn >6 minute period and the instrument was retroflexed in the rectum. The rectal findings were:Protruding lesions:     -Internal Hemorrhoids  The patient tolerated the entire procedure well. Blood Loss minimal nominal    For biopsy  Specimen verification by physician and nurse two sources, name,           social security numbers     Colon preparation was good    Recommendations:     - If adenoma is present, repeat colonoscopy in 5 years.       Copies sent to   Minh Bond MD  CC:  Meli Ruelas MD

## 2018-08-07 NOTE — ANESTHESIA PREPROCEDURE EVALUATION
Anesthetic History   No history of anesthetic complications            Review of Systems / Medical History  Patient summary reviewed and pertinent labs reviewed    Pulmonary  Within defined limits                 Neuro/Psych   Within defined limits           Cardiovascular    Hypertension              Exercise tolerance: >4 METS     GI/Hepatic/Renal  Within defined limits              Endo/Other  Within defined limits           Other Findings              Physical Exam    Airway  Mallampati: II  TM Distance: 4 - 6 cm  Neck ROM: normal range of motion   Mouth opening: Normal     Cardiovascular    Rhythm: regular  Rate: normal         Dental    Dentition: Upper dentition intact and Lower dentition intact     Pulmonary  Breath sounds clear to auscultation               Abdominal  GI exam deferred       Other Findings            Anesthetic Plan    ASA: 2  Anesthesia type: MAC            Anesthetic plan and risks discussed with: Patient

## 2018-08-10 NOTE — PROGRESS NOTES
The patient has an appointment to see nurse practitioner Galina  with psychiatry tomorrow and on Thursday has an appointment for a  colonoscopy at the [facility].  Other new complaints denied. Patient is seen for evaluation.

## 2018-08-13 NOTE — PROGRESS NOTES
Patient's medical status is generally stable. The nephrotic syndrome is resolved. His urine protein is  negative. We continue to follow his renal function, noted an elevated  potassium and calcium, which will be repeated today. Blood pressure control is adequate. In talking to him superficially, he seems to be fine. I agree  with Psychiatry appointment which is scheduled for tomorrow. The  determination by that appointment will be made,  neuropsychological testing will be helpful. He will be back to see me in about 3-4 months, sooner if needed. We ask his wife to give the nurse practitioner a similar note  privately.
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
## 2018-08-20 ENCOUNTER — OFFICE VISIT (OUTPATIENT)
Dept: BEHAVIORAL/MENTAL HEALTH CLINIC | Age: 68
End: 2018-08-20

## 2018-08-20 VITALS — WEIGHT: 92 LBS | BODY MASS INDEX: 14.44 KG/M2 | HEIGHT: 67 IN

## 2018-08-20 DIAGNOSIS — F03.91 DEMENTIA WITH BEHAVIORAL DISTURBANCE, UNSPECIFIED DEMENTIA TYPE: Primary | ICD-10-CM

## 2018-08-20 RX ORDER — DONEPEZIL HYDROCHLORIDE 5 MG/1
5 TABLET, FILM COATED ORAL
Qty: 30 TAB | Refills: 1 | Status: SHIPPED | OUTPATIENT
Start: 2018-08-20 | End: 2018-11-01 | Stop reason: SDUPTHER

## 2018-08-20 RX ORDER — RISPERIDONE 0.5 MG/1
0.5 TABLET, FILM COATED ORAL
Qty: 30 TAB | Refills: 1 | Status: SHIPPED | OUTPATIENT
Start: 2018-08-20 | End: 2018-11-01 | Stop reason: SDUPTHER

## 2018-08-20 NOTE — PATIENT INSTRUCTIONS
Dementia: Care Instructions  Your Care Instructions    Dementia is a loss of mental skills that affects your daily life. It is different than the occasional trouble with memory that is part of aging. You may find it hard to remember things that you feel you should be able to remember. Or you may feel that your mind is just not working as well as usual.  Finding out that you have dementia is a shock. You may be afraid and worried about how the condition will change your life. Although there is no cure at this time, medicine may slow memory loss and improve thinking for a while. Other medicines may be able to help you sleep or cope with depression and behavior changes. Dementia often gets worse slowly. But it can get worse quickly. As dementia gets worse, it may become harder to do common things that take planning, like making a list and going shopping. Over time, the disease may make it hard for you to take care of yourself. Some people with dementia need others to help care for them. Dementia is different for everyone. You may be able to function well for a long time. In the early stage of the condition, you can do things at home to make life easier and safer. You also can keep doing your hobbies and other activities. Many people find comfort in planning now for their future needs. Follow-up care is a key part of your treatment and safety. Be sure to make and go to all appointments, and call your doctor if you are having problems. It's also a good idea to know your test results and keep a list of the medicines you take. How can you care for yourself at home? · Take your medicines exactly as prescribed. Call your doctor if you think you are having a problem with your medicine. · Eat healthy foods. Eat lots of whole grains, fruits, and vegetables every day.  If you are not hungry, try snacks or nutritional drinks such as Boost, Ensure, or Sustacal.  · If you have problems sleeping:  ¨ Try not to nap too close to your bedtime. ¨ Exercise regularly. Walking is a good choice. ¨ Try a glass of warm milk or caffeine-free herbal tea before bed. · Do tasks and activities during the time of day when you feel your best. It may help to develop a daily routine. · Post labels, lists, and sticky notes to help you remember things. Write your activities on a calendar you can easily find. Put your clock where you can easily see it. · Stay active. Take walks in familiar places, or with friends or loved ones. Try to stay active mentally too. Read and work crossword puzzles if you enjoy these activities. · Do not drive unless you can pass an on-road driving test. If you are not sure if you are safe to drive, your state 's license bureau can test you. · Keep a cordless phone and a flashlight with new batteries by your bed. If possible, put a phone in each of the main rooms of your house, or carry a cell phone in case you fall and cannot reach a phone. Or, you can wear a device around your neck or wrist. You push a button that sends a signal for help. Acknowledge your emotions and plan for the future  · Talk openly and honestly with your doctor. · Let yourself grieve. It is common to feel angry, scared, frustrated, anxious, or depressed. · Get emotional support from family, friends, a support group, or a counselor experienced in working with people who have dementia. · Ask for help if you need it. · Plan for the future. ¨ Talk to your family and doctor about preparing a living will and other important papers while you can make decisions. These papers tell your doctors how to care for you at the end of your life. ¨ Consider naming a person to make decisions about your care if you are not able to. When should you call for help? Call 911 anytime you think you may need emergency care.  For example, call if:    · You are lost and do not know whom to call.     · You are injured and do not know whom to call.    Call your doctor now or seek immediate medical care if:    · You are more confused or upset than usual.     · You feel like you could hurt yourself because your mind is not working well.   [facility] closely for changes in your health, and be sure to contact your doctor if you have any problems. Where can you learn more? Go to http://yony-nancy.info/. Enter Q326 in the search box to learn more about \"Dementia: Care Instructions. \"  Current as of: December 7, 2017  Content Version: 11.7  © 5772-6094 [employer]. Care instructions adapted under license by [employer] (which disclaims liability or warranty for this information). If you have questions about a medical condition or this instruction, always ask your healthcare professional. [address] any warranty or liability for your use of this information. Psychosis: Care Instructions  Your Care Instructions  A person with psychosis cannot tell the difference between what is real and what is not real. It can cause strange thoughts and behaviors. A person with psychosis may have:  · Delusions. These are beliefs that are not real.  · Hallucinations. These are things that the person sees or hears that are not really there. · Personality changes. Psychosis can be treated with medicines and counseling. It is important to take your medicines exactly as prescribed, even when you feel well. You will need ongoing follow-up care and may need lifelong treatment. When psychosis is not treated, the risks are higher for suicide, a hospital stay, and other problems. Early treatment called coordinated specialty care [facility]) may help a person who is having his or her first episode of psychotic thoughts. Ask your doctor about [address]. Follow-up care is a key part of your treatment and safety. Be sure to make and go to all appointments, and call your doctor if you are having problems.  It's also a good idea to know your test results and keep a list of the medicines you take. How can you care for yourself at home? · Be safe with medicines. Take your medicines exactly as prescribed. Call your doctor if you think you are having a problem with your medicine. You will get more details on the specific medicines your doctor prescribes. · Go to your counseling sessions and follow-up appointments. · Join a self-help or support group. These groups can be very helpful for some people with psychosis. · Get at least 30 minutes of exercise on most days of the week. Walking is a good choice. You also may want to do other activities, such as running, swimming, cycling, or playing tennis or team sports. · Get enough sleep. · Eat a healthy, balanced diet. It includes whole grains, dairy, fruits and vegetables, and protein. Eat a variety of foods from each of those groups. This will get you all the nutrients you need. · Avoid alcohol and drugs. · Keep the numbers for these national suicide hotlines: 4-972-062-TALK (3-565.461.3322) and 7-404-HDHLDZU (0-226.959.1030). If you or someone you know talks about suicide or about feeling hopeless, get help right away. For the caregiver  If you are caring for someone with psychosis, it is important that you take care of yourself as well. · Learn about psychosis. Know the first signs that symptoms are getting worse. · Make a plan with all family members about how to take care of your loved one when his or her symptoms are bad. · Talk about your fears and concerns and those of other family members. · Seek counseling if you need to. · Know your legal rights and the legal rights of your family member or loved one. · Take care of yourself. Stay involved with your own interests, such as your career, hobbies, and friends. Try things like exercise, positive self-talk, relaxation, and deep breathing to help manage your stress. · Give yourself time to grieve.  You may need to deal with emotions such as anger, fear, and frustration. After you work through your feelings, you will be better able to care for yourself and your family. When should you call for help? Call 911 anytime you think you may need emergency care. For example, call if:    · You feel you cannot stop from hurting yourself or someone else.     · Someone who has psychosis displays dangerous behavior, and you think the person might hurt himself or herself or someone else.   [facility] your doctor now or seek immediate medical care if:    · A person with psychosis mentions suicide. If a suicide threat seems real, with a specific plan and a way to carry it out, you should stay with the person, or ask someone you trust to stay with the person, until you get help.     · A person who has psychosis:  ¨ Starts to give away his or her possessions. ¨ Uses illegal drugs or drinks alcohol heavily. ¨ Talks or writes about death, including writing suicide notes and talking about guns, knives, or pills. ¨ Starts to spend a lot of time alone. ¨ Acts very aggressively or suddenly appears calm.     · You hear voices or think you see things that are not there.     · You have a sudden change in behavior.     · You have difficulty taking care of yourself or become confused doing simple chores or tasks.    Watch closely for changes in your health, and be sure to contact your doctor if:    · Your symptoms repeatedly upset your daily activities.     · You have symptoms of psychosis that are new or different from those you had before. Where can you learn more? Go to http://yony-nancy.info/. Enter D547 in the search box to learn more about \"Psychosis: Care Instructions. \"  Current as of: December 7, 2017  Content Version: 11.7  © 9430-6421 [employer] Care instructions adapted under license by [employer] (which disclaims liability or warranty for this information).  If you have questions about a medical condition or this instruction, always ask your healthcare professional. [address] any warranty or liability for your use of this information. Donepezil (By mouth)   Donepezil (tim-MPM-p-zil)  Treats Alzheimer disease. Brand Name(s): Aricept   There may be other brand names for this medicine. When This Medicine Should Not Be Used: This medicine is not right for everyone. Do not use it if you had an allergic reaction to donepezil or to medicines that contain piperidine. How to Use This Medicine:   Tablet, Dissolving Tablet  · Your doctor will tell you how much medicine to use. Do not use more than directed. · Read and follow the patient instructions that come with this medicine. Talk to your doctor or pharmacist if you have any questions. · Tablet: Swallow the 23-mg tablet whole. Do not crush, break, or chew it. · Disintegrating tablet:Make sure your hands are dry before you handle the disintegrating tablet. Peel back the foil from the blister pack, then remove the tablet. Do not push the tablet through the foil. Place the tablet in your mouth. After it has melted, swallow or take a drink of water. · Missed dose: Skip the missed dose and take your next dose at the regular time. Do not take extra medicine to make up for a missed dose. · Store the medicine in a closed container at room temperature, away from heat, moisture, and direct light. Drugs and Foods to Avoid:   Ask your doctor or pharmacist before using any other medicine, including over-the-counter medicines, vitamins, and herbal products. · Some medicines can affect how donepezil works.  Tell your doctor if you are using any of the following:   ¨ Bethanechol, carbamazepine, dexamethasone, ketoconazole, phenobarbital, phenytoin, quinidine, or rifampin  ¨ NSAID pain or arthritis medicine (such as aspirin, diclofenac, ibuprofen, naproxen)  Warnings While Using This Medicine:   · Tell your doctor if you are pregnant or breastfeeding, or if you have heart disease, lung disease, asthma or other breathing problems, stomach ulcers or bleeding, or trouble urinating. · This medicine may cause the following problems:   ¨ Slow heartbeat  ¨ Stomach or bowel bleeding  ¨ Seizures  · Tell any doctor or dentist who treats you that you are using this medicine. You may need to stop using this medicine several days before you have surgery or medical tests. · Your doctor will check your progress and the effects of this medicine at regular visits. Keep all appointments. · Keep all medicine out of the reach of children. Never share your medicine with anyone. Possible Side Effects While Using This Medicine:   Call your doctor right away if you notice any of these side effects:  · Allergic reaction: Itching or hives, swelling in your face or hands, swelling or tingling in your mouth or throat, chest tightness, trouble breathing  · Bloody or black, tarry stools  · Change in how much or how often you urinate  · Chest pain, slow or uneven heartbeat, trouble breathing  · Lightheadedness, dizziness, fainting  · Seizures  · Severe stomach pain  · Unusual bleeding, bruising, or weakness  · Vomiting of blood or material that looks like coffee grounds  If you notice these less serious side effects, talk with your doctor:   · Mild nausea, vomiting, or diarrhea  · Weight loss  If you notice other side effects that you think are caused by this medicine, tell your doctor. Call your doctor for medical advice about side effects. You may report side effects to FDA at 5-302-FDA-6202  © 2017 [address] Information is for End User's use only and may not be sold, redistributed or otherwise used for commercial purposes. The above information is an  only. It is not intended as medical advice for individual conditions or treatments. Talk to your doctor, nurse or pharmacist before following any medical regimen to see if it is safe and effective for you.   Risperidone (By mouth)   Risperidone (ris-PER-i-done)  Treats schizophrenia, bipolar disorder, and irritability caused by autistic disorder. Brand Name(s): RisperDAL, RisperDAL M-Tab, risperiDONE M-Tab   There may be other brand names for this medicine. When This Medicine Should Not Be Used: This medicine is not right for everyone. Do not use it if you had an allergic reaction to risperidone or paliperidone. How to Use This Medicine:   Liquid, Tablet, Dissolving Tablet  · Take your medicine as directed. Your dose may need to be changed several times to find what works best for you. · Measure the oral liquid medicine with a marked measuring spoon, oral syringe, or medicine cup. You may mix your dose with water, coffee, low-fat milk, or orange juice. Do not mix it with cola or tea. · Make sure your hands are dry before you handle the disintegrating tablet. Peel back the foil from the blister pack, then remove the tablet. Do not push the tablet through the foil. Place the tablet in your mouth. After it has melted, swallow or take a drink of water. · Missed dose: Take a dose as soon as you remember. If it is almost time for your next dose, wait until then and take a regular dose. Do not take extra medicine to make up for a missed dose. · Store the medicine in a closed container at room temperature, away from heat, moisture, and direct light. Do not freeze the oral liquid. Drugs and Foods to Avoid:   Ask your doctor or pharmacist before using any other medicine, including over-the-counter medicines, vitamins, and herbal products. · Some medicines can affect how risperidone works. Tell your doctor if you are using carbamazepine, clozapine, fluoxetine, furosemide, levodopa, paroxetine, phenobarbital, phenytoin, quinidine, rifampin, or blood pressure medicine. · Do not drink alcohol while you are using this medicine. · Tell your doctor if you use anything else that makes you sleepy.  Some examples are allergy medicine, narcotic pain medicine, and alcohol. Warnings While Using This Medicine:   · Tell your doctor if you are pregnant or breastfeeding, or if you have kidney disease, liver disease, diabetes, high cholesterol, Parkinson disease, trouble swallowing, or a history of breast cancer or seizures. Tell your doctor if you have heart failure, low blood pressure, or a history of a heart attack or stroke. · This medicine may cause the following problems:  ¨ Increased risk of stroke  ¨ Neuroleptic malignant syndrome (a nerve disorder that could be life-threatening)  ¨ Tardive dyskinesia (a muscle disorder that could become permanent)  ¨ High blood sugar levels or high cholesterol levels  ¨ Increased levels of prolactin hormone  · This medicine may make you dizzy, drowsy, or have trouble with thinking or controlling body movements, which may lead to falls, fractures or other injuries. Do not drive or do anything else that could be dangerous until you know how this medicine affects you. · This medicine may change how your body regulates temperature. Avoid activities that could cause you to become very cold, hot, or dehydrated. · This medicine may make you bleed, bruise, or get infections more easily. Take precautions to prevent illness and injury. Wash your hands often. · Risperdal® M-Tab® contains aspartame (phenylalanine). If you have phenylketonuria (PKU), talk to your doctor before using this medicine. · Your doctor will do lab tests at regular visits to check on the effects of this medicine. Keep all appointments. · Keep all medicine out of the reach of children. Never share your medicine with anyone.   Possible Side Effects While Using This Medicine:   Call your doctor right away if you notice any of these side effects:  · Allergic reaction: Itching or hives, swelling in your face or hands, swelling or tingling in your mouth or throat, chest tightness, trouble breathing  · Fast, slow, pounding, or uneven heartbeat  · Fever, sweating, confusion, or muscle stiffness  · Increased hunger or thirst, change in how much or how often you urinate  · Jerky muscle movements you cannot control (often in your face, tongue, or jaw)  · Lightheadedness, dizziness, or fainting  · Numbness or weakness on one side of your body, sudden or severe headache, problems with vision, speech, or walking  · Painful, prolonged erection  · Seizures or tremors  · Swelling of the breasts, breast soreness, nipple discharge (in both women and men)  · Trouble swallowing  If you notice these less serious side effects, talk with your doctor:   · Constipation, diarrhea, nausea, or upset stomach  · Drooling  · Drowsiness or trouble sleeping  · Weight gain  If you notice other side effects that you think are caused by this medicine, tell your doctor. Call your doctor for medical advice about side effects. You may report side effects to FDA at 0-716-FDA-0879  © 2017 [facility] Information is for End User's use only and may not be sold, redistributed or otherwise used for commercial purposes. The above information is an  only. It is not intended as medical advice for individual conditions or treatments. Talk to your doctor, nurse or pharmacist before following any medical regimen to see if it is safe and effective for you.

## 2018-08-20 NOTE — MR AVS SNAPSHOT
[address] [facility] [address] [place] [address] 
[phone] Patient: Juju Marino MRN: [id number] UGL:[birth year] Visit Information Date & Time Provider Department Dept. Phone Encounter #  
 8/20/2018 11:00 AM Cece Garvey NP [address] [phone] [id number] Follow-up Instructions Return in about 1 month (around 9/20/2018) for Med evaluation. Your Appointments 12/3/2018  9:30 AM  
Any with Hector French MD  
[address] [place] [address] and [facility] Appt Note: 4 month follow up  
 [address] [facility]  
  
   
 [address] [id number] Upcoming Health Maintenance Date Due Influenza Age 5 to Adult 8/1/2018 Pneumococcal 65+ High/Highest Risk (2 of 2 - PPSV23) 4/25/2019* MEDICARE YEARLY EXAM 10/26/2018 GLAUCOMA SCREENING Q2Y 12/12/2018 DTaP/Tdap/Td series (2 - Td) 12/12/2026 COLONOSCOPY 8/7/2028 *Topic was postponed. The date shown is not the original due date. Allergies as of 8/20/2018  Review Complete On: 8/20/2018 By: Cece Garvey NP No Known Allergies Current Immunizations  Reviewed on 6/19/2011 Name Date Influenza High Dose Vaccine PF 10/25/2017 Not reviewed this visit Vitals BP Pulse Height(growth percentile) Weight(growth percentile) BMI Smoking Status (P) 118/70 (P) 90 5' 7\" (1.702 m) 92 lb (41.7 kg) 14.41 kg/m2 Former Smoker BMI and BSA Data Body Mass Index Body Surface Area  
 14.41 kg/m 2 1.4 m 2 Preferred Pharmacy Pharmacy Name Phone [facility] [address] [place] [address] [phone] Your Updated Medication List  
  
   
This list is accurate as of 8/20/18 11:55 AM.  Always use your most recent med list.  
  
  
  
  
 donepezil 5 mg tablet Commonly known as:  ARICEPT Take 1 Tab by mouth nightly. Indications: dementia  
  
 furosemide 40 mg tablet Commonly known as:  LASIX Take 1 tablet every morning  
  
 mirtazapine 15 mg tablet Commonly known as:  Aureliano Mendozaer Take 1 Tab by mouth nightly. risperiDONE 0.5 mg tablet Commonly known as:  RisperDAL Take 1 Tab by mouth nightly. Indications: psychosis  
  
 tamsulosin 0.4 mg capsule Commonly known as:  FLOMAX TAKE ONE CAPSULE BY MOUTH DAILY Prescriptions Sent to Pharmacy Refills  
 risperiDONE (RISPERDAL) 0.5 mg tablet 1 Sig: Take 1 Tab by mouth nightly. Indications: psychosis Class: Normal  
 Pharmacy: [facility] [address], [facility] Ph #: [phone] Route: Oral  
 donepezil (ARICEPT) 5 mg tablet 1 Sig: Take 1 Tab by mouth nightly. Indications: dementia Class: Normal  
 Pharmacy: [facility] [address], [facility] Ph #: [phone] Route: Oral  
  
Follow-up Instructions Return in about 1 month (around 9/20/2018) for Med evaluation. Patient Instructions Dementia: Care Instructions Your Care Instructions Dementia is a loss of mental skills that affects your daily life. It is different than the occasional trouble with memory that is part of aging. You may find it hard to remember things that you feel you should be able to remember. Or you may feel that your mind is just not working as well as usual. 
Finding out that you have dementia is a shock. You may be afraid and worried about how the condition will change your life. Although there is no cure at this time, medicine may slow memory loss and improve thinking for a while. Other medicines may be able to help you sleep or cope with depression and behavior changes. Dementia often gets worse slowly. But it can get worse quickly.  As dementia gets worse, it may become harder to do common things that take planning, like making a list and going shopping. Over time, the disease may make it hard for you to take care of yourself. Some people with dementia need others to help care for them. Dementia is different for everyone. You may be able to function well for a long time. In the early stage of the condition, you can do things at home to make life easier and safer. You also can keep doing your hobbies and other activities. Many people find comfort in planning now for their future needs. Follow-up care is a key part of your treatment and safety. Be sure to make and go to all appointments, and call your doctor if you are having problems. It's also a good idea to know your test results and keep a list of the medicines you take. How can you care for yourself at home? · Take your medicines exactly as prescribed. Call your doctor if you think you are having a problem with your medicine. · Eat healthy foods. Eat lots of whole grains, fruits, and vegetables every day. If you are not hungry, try snacks or nutritional drinks such as Boost, Ensure, or Sustacal. 
· If you have problems sleeping: ¨ Try not to nap too close to your bedtime. ¨ Exercise regularly. Walking is a good choice. ¨ Try a glass of warm milk or caffeine-free herbal tea before bed. · Do tasks and activities during the time of day when you feel your best. It may help to develop a daily routine. · Post labels, lists, and sticky notes to help you remember things. Write your activities on a calendar you can easily find. Put your clock where you can easily see it. · Stay active. Take walks in familiar places, or with friends or loved ones. Try to stay active mentally too. Read and work crossword puzzles if you enjoy these activities.  
· Do not drive unless you can pass an on-road driving test. If you are not sure if you are safe to drive, your state 's license bureau can test you. 
· Keep a cordless phone and a flashlight with new batteries by your bed. If possible, put a phone in each of the main rooms of your house, or carry a cell phone in case you fall and cannot reach a phone. Or, you can wear a device around your neck or wrist. You push a button that sends a signal for help. Acknowledge your emotions and plan for the future · Talk openly and honestly with your doctor. · Let yourself grieve. It is common to feel angry, scared, frustrated, anxious, or depressed. · Get emotional support from family, friends, a support group, or a counselor experienced in working with people who have dementia. · Ask for help if you need it. · Plan for the future. ¨ Talk to your family and doctor about preparing a living will and other important papers while you can make decisions. These papers tell your doctors how to care for you at the end of your life. ¨ Consider naming a person to make decisions about your care if you are not able to. When should you call for help? Call 911 anytime you think you may need emergency care. For example, call if: 
  · You are lost and do not know whom to call.  
  · You are injured and do not know whom to call.  
 Call your doctor now or seek immediate medical care if: 
  · You are more confused or upset than usual.  
  · You feel like you could hurt yourself because your mind is not working well.  
Garry Medin closely for changes in your health, and be sure to contact your doctor if you have any problems. Where can you learn more? Go to http://yony-nancy.info/. Enter K768 in the search box to learn more about \"Dementia: Care Instructions. \" Current as of: December 7, 2017 Content Version: 11.7 © 0128-2498 [employer], [employer]. Care instructions adapted under license by [employer] (which disclaims liability or warranty for this information).  If you have questions about a medical condition or this instruction, always ask your healthcare professional. [place] [address] any warranty or liability for your use of this information. Psychosis: Care Instructions Your Care Instructions A person with psychosis cannot tell the difference between what is real and what is not real. It can cause strange thoughts and behaviors. A person with psychosis may have: · Delusions. These are beliefs that are not real. 
· Hallucinations. These are things that the person sees or hears that are not really there. · Personality changes. Psychosis can be treated with medicines and counseling. It is important to take your medicines exactly as prescribed, even when you feel well. You will need ongoing follow-up care and may need lifelong treatment. When psychosis is not treated, the risks are higher for suicide, a hospital stay, and other problems. Early treatment called coordinated specialty care [facility]) may help a person who is having his or her first episode of psychotic thoughts. Ask your doctor about [address]. Follow-up care is a key part of your treatment and safety. Be sure to make and go to all appointments, and call your doctor if you are having problems. It's also a good idea to know your test results and keep a list of the medicines you take. How can you care for yourself at home? · Be safe with medicines. Take your medicines exactly as prescribed. Call your doctor if you think you are having a problem with your medicine. You will get more details on the specific medicines your doctor prescribes. · Go to your counseling sessions and follow-up appointments. · Join a self-help or support group. These groups can be very helpful for some people with psychosis. · Get at least 30 minutes of exercise on most days of the week. Walking is a good choice. You also may want to do other activities, such as running, swimming, cycling, or playing tennis or team sports. · Get enough sleep. · Eat a healthy, balanced diet. It includes whole grains, dairy, fruits and vegetables, and protein. Eat a variety of foods from each of those groups. This will get you all the nutrients you need. · Avoid alcohol and drugs. · Keep the numbers for these national suicide hotlines: 8-563-039-TALK (6-620.884.2164) and 7-741-RQBRHPD (1-493.246.7764). If you or someone you know talks about suicide or about feeling hopeless, get help right away. For the caregiver If you are caring for someone with psychosis, it is important that you take care of yourself as well. · Learn about psychosis. Know the first signs that symptoms are getting worse. · Make a plan with all family members about how to take care of your loved one when his or her symptoms are bad. · Talk about your fears and concerns and those of other family members. · Seek counseling if you need to. · Know your legal rights and the legal rights of your family member or loved one. · Take care of yourself. Stay involved with your own interests, such as your career, hobbies, and friends. Try things like exercise, positive self-talk, relaxation, and deep breathing to help manage your stress. · Give yourself time to grieve. You may need to deal with emotions such as anger, fear, and frustration. After you work through your feelings, you will be better able to care for yourself and your family. When should you call for help? Call 911 anytime you think you may need emergency care. For example, call if: 
  · You feel you cannot stop from hurting yourself or someone else.  
  · Someone who has psychosis displays dangerous behavior, and you think the person might hurt himself or herself or someone else.  
[facility] your doctor now or seek immediate medical care if: 
  · A person with psychosis mentions suicide.  If a suicide threat seems real, with a specific plan and a way to carry it out, you should stay with the person, or ask someone you trust to stay with the person, until you get help.  
  · A person who has psychosis: 
¨ Starts to give away his or her possessions. ¨ Uses illegal drugs or drinks alcohol heavily. ¨ Talks or writes about death, including writing suicide notes and talking about guns, knives, or pills. ¨ Starts to spend a lot of time alone. ¨ Acts very aggressively or suddenly appears calm.  
  · You hear voices or think you see things that are not there.  
  · You have a sudden change in behavior.  
  · You have difficulty taking care of yourself or become confused doing simple chores or tasks.  
 Watch closely for changes in your health, and be sure to contact your doctor if: 
  · Your symptoms repeatedly upset your daily activities.  
  · You have symptoms of psychosis that are new or different from those you had before. Where can you learn more? Go to http://yony-nancy.info/. Enter D547 in the search box to learn more about \"Psychosis: Care Instructions. \" Current as of: December 7, 2017 Content Version: 11.7 © 3181-4226 [employer]. Care instructions adapted under license by [employer] (which disclaims liability or warranty for this information). If you have questions about a medical condition or this instruction, always ask your healthcare professional. [address] any warranty or liability for your use of this information. Donepezil (By mouth) Donepezil (qxa-UJO-m-zil) Treats Alzheimer disease. Brand Name(s): Aricept There may be other brand names for this medicine. When This Medicine Should Not Be Used: This medicine is not right for everyone. Do not use it if you had an allergic reaction to donepezil or to medicines that contain piperidine. How to Use This Medicine:  
Tablet, Dissolving Tablet · Your doctor will tell you how much medicine to use. Do not use more than directed. · Read and follow the patient instructions that come with this medicine. Talk to your doctor or pharmacist if you have any questions. · Tablet: Swallow the 23-mg tablet whole. Do not crush, break, or chew it. · Disintegrating tablet:Make sure your hands are dry before you handle the disintegrating tablet. Peel back the foil from the blister pack, then remove the tablet. Do not push the tablet through the foil. Place the tablet in your mouth. After it has melted, swallow or take a drink of water. · Missed dose: Skip the missed dose and take your next dose at the regular time. Do not take extra medicine to make up for a missed dose. · Store the medicine in a closed container at room temperature, away from heat, moisture, and direct light. Drugs and Foods to Avoid: Ask your doctor or pharmacist before using any other medicine, including over-the-counter medicines, vitamins, and herbal products. · Some medicines can affect how donepezil works. Tell your doctor if you are using any of the following: ¨ Bethanechol, carbamazepine, dexamethasone, ketoconazole, phenobarbital, phenytoin, quinidine, or rifampin ¨ NSAID pain or arthritis medicine (such as aspirin, diclofenac, ibuprofen, naproxen) Warnings While Using This Medicine: · Tell your doctor if you are pregnant or breastfeeding, or if you have heart disease, lung disease, asthma or other breathing problems, stomach ulcers or bleeding, or trouble urinating. · This medicine may cause the following problems: ¨ Slow heartbeat ¨ Stomach or bowel bleeding ¨ Seizures · Tell any doctor or dentist who treats you that you are using this medicine. You may need to stop using this medicine several days before you have surgery or medical tests. · Your doctor will check your progress and the effects of this medicine at regular visits. Keep all appointments. · Keep all medicine out of the reach of children. Never share your medicine with anyone. Possible Side Effects While Using This Medicine:  
Call your doctor right away if you notice any of these side effects: · Allergic reaction: Itching or hives, swelling in your face or hands, swelling or tingling in your mouth or throat, chest tightness, trouble breathing · Bloody or black, tarry stools · Change in how much or how often you urinate · Chest pain, slow or uneven heartbeat, trouble breathing · Lightheadedness, dizziness, fainting · Seizures · Severe stomach pain · Unusual bleeding, bruising, or weakness · Vomiting of blood or material that looks like coffee grounds If you notice these less serious side effects, talk with your doctor: · Mild nausea, vomiting, or diarrhea · Weight loss If you notice other side effects that you think are caused by this medicine, tell your doctor. Call your doctor for medical advice about side effects. You may report side effects to FDA at 3-522-FDA-9441 © 2017 [address] Information is for End User's use only and may not be sold, redistributed or otherwise used for commercial purposes. The above information is an  only. It is not intended as medical advice for individual conditions or treatments. Talk to your doctor, nurse or pharmacist before following any medical regimen to see if it is safe and effective for you. Risperidone (By mouth) Risperidone (ris-PER-i-done) Treats schizophrenia, bipolar disorder, and irritability caused by autistic disorder. Brand Name(s): RisperDAL, RisperDAL M-Tab, risperiDONE M-Tab There may be other brand names for this medicine. When This Medicine Should Not Be Used: This medicine is not right for everyone. Do not use it if you had an allergic reaction to risperidone or paliperidone. How to Use This Medicine:  
Liquid, Tablet, Dissolving Tablet · Take your medicine as directed. Your dose may need to be changed several times to find what works best for you. · Measure the oral liquid medicine with a marked measuring spoon, oral syringe, or medicine cup. You may mix your dose with water, coffee, low-fat milk, or orange juice. Do not mix it with cola or tea. · Make sure your hands are dry before you handle the disintegrating tablet. Peel back the foil from the blister pack, then remove the tablet. Do not push the tablet through the foil. Place the tablet in your mouth. After it has melted, swallow or take a drink of water. · Missed dose: Take a dose as soon as you remember. If it is almost time for your next dose, wait until then and take a regular dose. Do not take extra medicine to make up for a missed dose. · Store the medicine in a closed container at room temperature, away from heat, moisture, and direct light. Do not freeze the oral liquid. Drugs and Foods to Avoid: Ask your doctor or pharmacist before using any other medicine, including over-the-counter medicines, vitamins, and herbal products. · Some medicines can affect how risperidone works. Tell your doctor if you are using carbamazepine, clozapine, fluoxetine, furosemide, levodopa, paroxetine, phenobarbital, phenytoin, quinidine, rifampin, or blood pressure medicine. · Do not drink alcohol while you are using this medicine. · Tell your doctor if you use anything else that makes you sleepy. Some examples are allergy medicine, narcotic pain medicine, and alcohol. Warnings While Using This Medicine: · Tell your doctor if you are pregnant or breastfeeding, or if you have kidney disease, liver disease, diabetes, high cholesterol, Parkinson disease, trouble swallowing, or a history of breast cancer or seizures. Tell your doctor if you have heart failure, low blood pressure, or a history of a heart attack or stroke. · This medicine may cause the following problems: 
¨ Increased risk of stroke ¨ Neuroleptic malignant syndrome (a nerve disorder that could be life-threatening) ¨ Tardive dyskinesia (a muscle disorder that could become permanent) ¨ High blood sugar levels or high cholesterol levels ¨ Increased levels of prolactin hormone · This medicine may make you dizzy, drowsy, or have trouble with thinking or controlling body movements, which may lead to falls, fractures or other injuries. Do not drive or do anything else that could be dangerous until you know how this medicine affects you. · This medicine may change how your body regulates temperature. Avoid activities that could cause you to become very cold, hot, or dehydrated. · This medicine may make you bleed, bruise, or get infections more easily. Take precautions to prevent illness and injury. Wash your hands often. · Risperdal® M-Tab® contains aspartame (phenylalanine). If you have phenylketonuria (PKU), talk to your doctor before using this medicine. · Your doctor will do lab tests at regular visits to check on the effects of this medicine. Keep all appointments. · Keep all medicine out of the reach of children. Never share your medicine with anyone. Possible Side Effects While Using This Medicine:  
Call your doctor right away if you notice any of these side effects: · Allergic reaction: Itching or hives, swelling in your face or hands, swelling or tingling in your mouth or throat, chest tightness, trouble breathing · Fast, slow, pounding, or uneven heartbeat · Fever, sweating, confusion, or muscle stiffness · Increased hunger or thirst, change in how much or how often you urinate · Jerky muscle movements you cannot control (often in your face, tongue, or jaw) · Lightheadedness, dizziness, or fainting · Numbness or weakness on one side of your body, sudden or severe headache, problems with vision, speech, or walking · Painful, prolonged erection · Seizures or tremors · Swelling of the breasts, breast soreness, nipple discharge (in both women and men) · Trouble swallowing If you notice these less serious side effects, talk with your doctor: · Constipation, diarrhea, nausea, or upset stomach · Drooling · Drowsiness or trouble sleeping · Weight gain If you notice other side effects that you think are caused by this medicine, tell your doctor. Call your doctor for medical advice about side effects. You may report side effects to FDA at 4-508-RJC-0753 © 2017 [address] Information is for End User's use only and may not be sold, redistributed or otherwise used for commercial purposes. The above information is an  only. It is not intended as medical advice for individual conditions or treatments. Talk to your doctor, nurse or pharmacist before following any medical regimen to see if it is safe and effective for you. Introducing [facility]! Susan Ramirez introduces [facility] patient portal. Now you can access parts of your medical record, email your doctor's office, and request medication refills online. 1. In your internet browser, go to [email or web address] 2. Click on the First Time User? Click Here link in the Sign In box. You will see the New Member Sign Up page. 3. Enter your [facility] Access Code exactly as it appears below. You will not need to use this code after youve completed the sign-up process. If you do not sign up before the expiration date, you must request a new code. · [facility] Access Code: [email or web address] Expires: 11/4/2018 10:16 AM 
 
4. Enter the last four digits of your Social Security Number (xxxx) and Date of Birth (mm/dd/yyyy) as indicated and click Submit. You will be taken to the next sign-up page. 5. Create a [place] ID. This will be your [facility] login ID and cannot be changed, so think of one that is secure and easy to remember. 6. Create a [facility] password. You can change your password at any time. 7. Enter your Password Reset Question and Answer.  This can be used at a later time if you forget your password. 8. Enter your e-mail address. You will receive e-mail notification when new information is available in [address] 9. Click Sign Up. You can now view and download portions of your medical record. 10. Click the Download Summary menu link to download a portable copy of your medical information. If you have questions, please visit the Frequently Asked Questions section of the [employer] website. Remember, [employer] is NOT to be used for urgent needs. For medical emergencies, dial 911. Now available from your iPhone and Android! Please provide this summary of care documentation to your next provider. Your primary care clinician is listed as Jennifer Neil. If you have any questions after today's visit, please call [phone].
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
## 2018-09-25 ENCOUNTER — OFFICE VISIT (OUTPATIENT)
Dept: BEHAVIORAL/MENTAL HEALTH CLINIC | Age: 68
End: 2018-09-25

## 2018-09-25 VITALS — DIASTOLIC BLOOD PRESSURE: 76 MMHG | HEART RATE: 72 BPM | HEIGHT: 67 IN | SYSTOLIC BLOOD PRESSURE: 126 MMHG

## 2018-09-25 DIAGNOSIS — F03.91 DEMENTIA WITH BEHAVIORAL DISTURBANCE, UNSPECIFIED DEMENTIA TYPE: Primary | ICD-10-CM

## 2018-09-25 NOTE — MR AVS SNAPSHOT
[address] [facility] [address] [place] [address] 
[phone] Patient: Governor Starkey MRN: [id number] RUDY:[id number] Visit Information Date & Time Provider Department Dept. Phone Encounter #  
 9/25/2018  1:00 PM Clinton Evangelista NP [facility] 5 [phone] [id number] Follow-up Instructions Return in about 2 months (around 11/25/2018) for Med evaluation. Your Appointments 12/3/2018  9:30 AM  
Any with Narcisa Stewart MD  
[address] [place] [address] and [facility] Appt Note: 4 month follow up  
 [facility] [address] [facility]  
  
   
 [address] [address] Upcoming Health Maintenance Date Due Shingrix Vaccine Age 50> (1 of 2) 5/20/2001 Influenza Age 5 to Adult 8/1/2018 Pneumococcal 65+ High/Highest Risk (2 of 2 - PPSV23) 4/25/2019* MEDICARE YEARLY EXAM 10/26/2018 GLAUCOMA SCREENING Q2Y 12/12/2018 DTaP/Tdap/Td series (2 - Td) 12/12/2026 COLONOSCOPY 8/7/2028 *Topic was postponed. The date shown is not the original due date. Allergies as of 9/25/2018  Review Complete On: 9/25/2018 By: Clinton Evangelista NP No Known Allergies Current Immunizations  Reviewed on 6/19/2011 Name Date Influenza High Dose Vaccine PF 10/25/2017 Not reviewed this visit You Were Diagnosed With   
  
 Codes Comments Dementia with behavioral disturbance, unspecified dementia type    -  Primary ICD-10-CM: F03.91 
ICD-9-CM: 294.21 Vitals BP Pulse Height(growth percentile) Smoking Status 126/76 72 5' 7\" (1.702 m) Former Smoker Vitals History Preferred Pharmacy Pharmacy Name Phone [facility] [address], [address] [place] [address] Drive [phone] Your Updated Medication List  
  
   
 This list is accurate as of 9/25/18  1:24 PM.  Always use your most recent med list.  
  
  
  
  
 donepezil 5 mg tablet Commonly known as:  ARICEPT Take 1 Tab by mouth nightly. Indications: dementia  
  
 furosemide 40 mg tablet Commonly known as:  LASIX Take 1 tablet every morning  
  
 risperiDONE 0.5 mg tablet Commonly known as:  RisperDAL Take 1 Tab by mouth nightly. Indications: psychosis  
  
 tamsulosin 0.4 mg capsule Commonly known as:  FLOMAX TAKE ONE CAPSULE BY MOUTH DAILY Follow-up Instructions Return in about 2 months (around 11/25/2018) for Med evaluation. Introducing hospitals & HEALTH SERVICES! Sima Lopez introduces [facility] patient portal. Now you can access parts of your medical record, email your doctor's office, and request medication refills online. 1. In your internet browser, go to [email or web address] 2. Click on the First Time User? Click Here link in the Sign In box. You will see the New Member Sign Up page. 3. Enter your [facility] Access Code exactly as it appears below. You will not need to use this code after youve completed the sign-up process. If you do not sign up before the expiration date, you must request a new code. · [facility] Access Code: [email or web address] Expires: 11/4/2018 10:16 AM 
 
4. Enter the last four digits of your Social Security Number (xxxx) and Date of Birth (mm/dd/yyyy) as indicated and click Submit. You will be taken to the next sign-up page. 5. Create a [employer] ID. This will be your [facility] login ID and cannot be changed, so think of one that is secure and easy to remember. 6. Create a [facility] password. You can change your password at any time. 7. Enter your Password Reset Question and Answer. This can be used at a later time if you forget your password. 8. Enter your e-mail address. You will receive e-mail notification when new information is available in [address] 9. Click Sign Up. You can now view and download portions of your medical record. 10. Click the Download Summary menu link to download a portable copy of your medical information. If you have questions, please visit the Frequently Asked Questions section of the [employer] website. Remember, [facility] is NOT to be used for urgent needs. For medical emergencies, dial 911. Now available from your iPhone and Android! Please provide this summary of care documentation to your next provider. Your primary care clinician is listed as Danette Walton. If you have any questions after today's visit, please call [phone].

## 2018-09-28 NOTE — PROGRESS NOTES
CHIEF COMPLAINT:  Saundra Lewis is a 80 y.o. male and was seen today for follow-up of psychiatric condition and psychotropic medication management. Last office visit was 8/20/18. HPI:    Ten Madrigal reports the following psychiatric symptoms: poor memory, hallucinations. The symptoms have been present for about a year and are of moderate severity. The symptoms occur daily, but not in past 3 weeks. Patient is accompanied by his spouse. Patient and spouse report no hallucinations in past 3 weeks. Spouse reports his behavior has significantly improved with risperidone and Aricept. Spouse and patient report he is sleeping well. Spouse reports he \"laughs\" in his sleep some nights. Spouse reports no agitation. Patient and spouse report \"good\" appetite and normal psychomotor. Patient denies SI/HI/SIB. No AVH or paranoid behavior in about 3 weeks. Denies any symptoms of shagufta or psychosis. No reports of anxiety. 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 He has 2 brothers and 2 sisters and his mother lives in the [facility] area. Patient reports he is close with his family. He is a High School graduate and worked as a  doing [employer] work, until he became disabled.  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       Visit Vitals    /76    Pulse 72    Ht 5' 7\" (1.702 m)       LABS:   Results for orders placed or performed in visit on 74/47/28   METABOLIC PANEL, BASIC   Result Value Ref Range    Glucose 92 65 - 99 mg/dL    BUN 20 8 - 27 mg/dL    Creatinine 1.06 0.76 - 1.27 mg/dL    GFR est non-AA 72 >59 mL/min/1.73    GFR est AA 84 >59 mL/min/1.73    BUN/Creatinine ratio 19 10 - 24    Sodium 141 134 - 144 mmol/L    Potassium 4.7 3.5 - 5.2 mmol/L    Chloride 103 96 - 106 mmol/L    CO2 23 20 - 29 mmol/L    Calcium 9.4 8.6 - 10.2 mg/dL       MITCHELL: none  Smoker: not now. Former smoker for 25 years. Alcohol: none now    SCALES:  8/20/2018  PHQ-9 score is 2, indicating minimal or no amount of depression. KATHIE score is 7, indicating mild anxiety  MOCA score is 18/30, indicating cognitive disorder.   GDS score is 3 indicating no depression       REVIEW OF SYSTEMS:  Psychiatric:  memory loss, hallucinations (none current)  Appetite:good   Sleep: improved   Constitutional: no fever, no night sweats, no increased weakness  HEENT: No hearing or visual changes  Integument: no rash or lesions  Neuro: no headache, no dizziness, poor memory  CV: no chest pain, no palpitations  Resp: no cough, no SOB  GI: No N/V, no abdominal pain or tenderness, no constipation or diarrhea  : no difficulty with urination  MS: no increased weakness, no muscle stiffness, non-ambulatory and uses wheelchair, no tremors, no EPS symptoms      Side Effects:  none    MENTAL STATUS EXAM:   Sensorium  Alert and Oriented x 2   Relations cooperative   Appearance:  age appropriate and casually dressed   Motor Behavior:  within normal limits and non-ambulatory, uses wheelchair   Speech:  normal pitch and normal volume   Thought Process: goal directed and logical   Thought Content free of delusions, free of hallucinations and not internally preoccupied    Suicidal ideations none   Homicidal ideations none   Mood:  stable   Affect:  stable   Memory recent  impaired   Memory remote:  impaired   Concentration:  adequate   Abstraction:  abstract   Insight:  limited   Reliability fair   Judgment:  fair     MEDICAL DECISION MAKING:  Problems addressed today:    ICD-10-CM ICD-9-CM    1. Dementia with behavioral disturbance, unspecified dementia type F03.91 294.21          Assessment:   Alice Nooann is responding to treatment, symptoms are improved. Spouse reports no hallucinations in about 3 weeks and his behavior has significantly improved since starting risperidone and Aricept. Current Outpatient Prescriptions   Medication Sig Dispense Refill    risperiDONE (RISPERDAL) 0.5 mg tablet Take 1 Tab by mouth nightly. Indications: psychosis 30 Tab 1    donepezil (ARICEPT) 5 mg tablet Take 1 Tab by mouth nightly. Indications: dementia 30 Tab 1    tamsulosin (FLOMAX) 0.4 mg capsule TAKE ONE CAPSULE BY MOUTH DAILY 90 Cap 3    furosemide (LASIX) 40 mg tablet Take 1 tablet every morning 30 Tab 11       Plan:   1. Medications/Labs: Continue risperiDONE 0.5 mg at bedtime for psychosis (hallucinations and agitation). Continue Aricept 5 mg at bedtime for dementia. Reviewed the side effects, risks, benefits and options of medications with patient. Reviewed the increased risk of CV risks with antipsychotics, including stroke. Instructed patient/spouse to go to ED if has thoughts of hurting self or in danger of hurting self because of behavior. Patient/spouse given opportunity to ask questions. Patient/spouse instructed to call with any side effects, questions or concerns. 2.  Counseling and coordination of care including instructions for treatment, risks/benefits, risk factor reduction and patient/family education. He and spouse agree with the plan. Patient/spouse instructed to call with any side effects, questions or issues. 3.  Follow-up Disposition:  Return in about 2 months (around 11/25/2018) for Med evaluation.     Luci Mcdowell NP  9/25/2018

## 2018-11-01 RX ORDER — DONEPEZIL HYDROCHLORIDE 5 MG/1
TABLET, FILM COATED ORAL
Qty: 90 TAB | Refills: 0 | Status: SHIPPED | OUTPATIENT
Start: 2018-11-01 | End: 2018-11-27 | Stop reason: SDUPTHER

## 2018-11-01 RX ORDER — RISPERIDONE 0.5 MG/1
0.5 TABLET, FILM COATED ORAL
Qty: 30 TAB | Refills: 0 | Status: SHIPPED | OUTPATIENT
Start: 2018-11-01 | End: 2018-11-01 | Stop reason: SDUPTHER

## 2018-11-01 RX ORDER — DONEPEZIL HYDROCHLORIDE 5 MG/1
5 TABLET, FILM COATED ORAL
Qty: 30 TAB | Refills: 0 | Status: SHIPPED | OUTPATIENT
Start: 2018-11-01 | End: 2018-11-01 | Stop reason: SDUPTHER

## 2018-11-26 ENCOUNTER — OFFICE VISIT (OUTPATIENT)
Dept: BEHAVIORAL/MENTAL HEALTH CLINIC | Age: 68
End: 2018-11-26

## 2018-11-26 VITALS
BODY MASS INDEX: 14.41 KG/M2 | DIASTOLIC BLOOD PRESSURE: 99 MMHG | HEIGHT: 67 IN | HEART RATE: 73 BPM | SYSTOLIC BLOOD PRESSURE: 167 MMHG

## 2018-11-26 DIAGNOSIS — F03.91 DEMENTIA WITH BEHAVIORAL DISTURBANCE, UNSPECIFIED DEMENTIA TYPE: Primary | ICD-10-CM

## 2018-11-26 RX ORDER — RISPERIDONE 0.5 MG/1
TABLET, FILM COATED ORAL
Qty: 30 TAB | Refills: 2 | Status: SHIPPED | OUTPATIENT
Start: 2018-11-26 | End: 2018-11-27

## 2018-11-27 RX ORDER — RISPERIDONE 0.5 MG/1
TABLET, FILM COATED ORAL
Qty: 90 TAB | Refills: 0 | OUTPATIENT
Start: 2018-11-27 | End: 2019-01-02 | Stop reason: SDUPTHER

## 2018-11-27 RX ORDER — DONEPEZIL HYDROCHLORIDE 5 MG/1
5 TABLET, FILM COATED ORAL
Qty: 90 TAB | Refills: 0 | OUTPATIENT
Start: 2018-11-27 | End: 2019-01-25 | Stop reason: SDUPTHER

## 2018-11-29 NOTE — PROGRESS NOTES
CHIEF COMPLAINT:  Devin Swenson is a 80 y.o. male and was seen today for follow-up of psychiatric condition and psychotropic medication management. Last office visit was 9/25/18. HPI:    Rebekah Germain reports the following psychiatric symptoms: poor memory, hx hallucinations. The symptoms have been present for about a year and are of low/moderate severity. The memory symptoms remain unchanged. Patient and spouse report no hallucinations. Spouse reports patient \"talks to self at night sometimes\". Patient is accompanied by his spouse for part of viist. Spouse and patient report he is sleeping well, good appetite and normal psychomotor. Spouse reports no agitation. Patient denies SI/HI/SIB. Denies any symptoms of shagufta or psychosis. No reports of anxiety. SCALES:  8/20/2018  PHQ-9 score is 2, indicating minimal or no amount of depression. KATHIE score is 7, indicating mild anxiety  MOCA score is 18/30, indicating cognitive disorder. GDS score is 3 indicating no depression    Past Psychiatric History:  Meds:   Current:   Risperdal 0.25 mg at bedtime  Aricept 5 mg at bedtime  Past:  Haldol  Cogentin  Remeron 15 mg    Outpt Treatment: Past: none  Past Hospitalizations: none  Suicide attempts: no    Family hx of suicide: No  Self injurious behaviors: none    ETOH: current: none. Did drink alcohol in past.  ILLICIT: denies  TOBACCO: Former smoker x 25 years. None current.   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 [address] 2011     bilateral amputation d/t gangrene    Hallucination      Hypertension      Pancreatic mass      Prediabetes      Renal failure      S/P colonoscopy 1-4-08    UTI (urinary tract infection)      Wound, open      Family History: Mother living - unknown medical problems. Father  65 - unknown reason.         Social History:  Family Dynamics: . Mother who is living and doing well. Has 2 sisters and 2 brothers. Reports he gets along with his family and they are a good support system for him.   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       Visit Vitals  BP (!) 167/99   Pulse 73   Ht 5' 7\" (1.702 m)   BMI 14.41 kg/m²       LABS:   Results for orders placed or performed in visit on    METABOLIC PANEL, BASIC   Result Value Ref Range    Glucose 92 65 - 99 mg/dL    BUN 20 8 - 27 mg/dL    Creatinine 1.06 0.76 - 1.27 mg/dL    GFR est non-AA 72 >59 mL/min/1.73    GFR est AA 84 >59 mL/min/1.73    BUN/Creatinine ratio 19 10 - 24    Sodium 141 134 - 144 mmol/L    Potassium 4.7 3.5 - 5.2 mmol/L    Chloride 103 96 - 106 mmol/L    CO2 23 20 - 29 mmol/L    Calcium 9.4 8.6 - 10.2 mg/dL            REVIEW OF SYSTEMS:  Psychiatric:  memory loss (no change)  Appetite:good   Sleep: good  Constitutional: no fever, no night sweats, no increased weakness  MS: no increased weakness, no muscle stiffness, non-ambulatory and uses wheelchair, no tremors, no EPS symptoms      Side Effects:  none    MENTAL STATUS EXAM:   Sensorium  Alert and Oriented x 2   Relations cooperative   Appearance:  age appropriate and casually dressed   Motor Behavior:  within normal limits and non-ambulatory, uses wheelchair   Speech:  normal pitch and normal volume   Thought Process: goal directed and logical   Thought Content free of delusions, free of hallucinations and not internally preoccupied    Suicidal ideations none   Homicidal ideations none   Mood:  stable   Affect:  stable   Memory recent  impaired   Memory remote:  impaired   Concentration: adequate   Abstraction:  abstract   Insight:  limited   Reliability fair/good   Judgment:  fair     MEDICAL DECISION MAKING:  Problems addressed today:    ICD-10-CM ICD-9-CM    1. Dementia with behavioral disturbance, unspecified dementia type F03.91 294.21          Assessment:   Derik Clement is responding to treatment, symptoms are improved. Spouse and patient report no hallucinations and his behavior has significantly improved since starting risperidone and Aricept. Current Outpatient Medications   Medication Sig Dispense Refill    tamsulosin (FLOMAX) 0.4 mg capsule TAKE ONE CAPSULE BY MOUTH DAILY 90 Cap 3    donepezil (ARICEPT) 5 mg tablet Take 1 Tab by mouth nightly. 90 Tab 0    risperiDONE (RISPERDAL) 0.5 mg tablet TAKE 1 TABLET BY MOUTH NIGHTLY 90 Tab 0    furosemide (LASIX) 40 mg tablet Take 1 tablet every morning 30 Tab 11       Plan:   1. Medications/Labs: Continue risperiDONE 0.5 mg at bedtime for psychosis (hallucinations and agitation). Continue Aricept 5 mg at bedtime for dementia. Reviewed the side effects, risks, benefits and options of medications with patient. Reviewed the increased risk of CV risks with antipsychotics, including stroke. Instructed patient/spouse to go to ED if has thoughts of hurting self or in danger of hurting self because of behavior. Patient/spouse given opportunity to ask questions. Patient/spouse instructed to call with any side effects, questions or concerns. 2. Medical: BP elevated today and reading provided to spouse to give to PCP. 3.  Counseling and coordination of care including instructions for treatment, risks/benefits, risk factor reduction and patient/family education. He and spouse agree with the plan. Patient/spouse instructed to call with any side effects, questions or issues. 4.  Follow-up Disposition:  Return in about 3 months (around 2/26/2019) for Med evaluation.     Marie Dick, [place]  11/26/2018

## 2018-12-03 ENCOUNTER — OFFICE VISIT (OUTPATIENT)
Dept: INTERNAL MEDICINE CLINIC | Age: 68
End: 2018-12-03

## 2018-12-03 VITALS
BODY MASS INDEX: 16.06 KG/M2 | WEIGHT: 102.3 LBS | DIASTOLIC BLOOD PRESSURE: 71 MMHG | OXYGEN SATURATION: 98 % | RESPIRATION RATE: 18 BRPM | TEMPERATURE: 97.7 F | HEIGHT: 67 IN | SYSTOLIC BLOOD PRESSURE: 113 MMHG | HEART RATE: 81 BPM

## 2018-12-03 DIAGNOSIS — N18.30 STAGE 3 CHRONIC KIDNEY DISEASE (HCC): ICD-10-CM

## 2018-12-03 DIAGNOSIS — Z00.00 MEDICARE ANNUAL WELLNESS VISIT, SUBSEQUENT: Primary | ICD-10-CM

## 2018-12-03 DIAGNOSIS — I10 ESSENTIAL HYPERTENSION: ICD-10-CM

## 2018-12-03 DIAGNOSIS — R60.1 ANASARCA: ICD-10-CM

## 2018-12-03 DIAGNOSIS — S88.911D AMPUTATION OF BOTH LOWER EXTREMITIES, SUBSEQUENT ENCOUNTER (HCC): ICD-10-CM

## 2018-12-03 DIAGNOSIS — Z23 ENCOUNTER FOR IMMUNIZATION: ICD-10-CM

## 2018-12-03 DIAGNOSIS — Z98.890 S/P CEREBRAL ANEURYSM REPAIR: ICD-10-CM

## 2018-12-03 DIAGNOSIS — Z86.79 S/P CEREBRAL ANEURYSM REPAIR: ICD-10-CM

## 2018-12-03 DIAGNOSIS — N04.9 NEPHROTIC SYNDROME: ICD-10-CM

## 2018-12-03 DIAGNOSIS — Z13.31 SCREENING FOR DEPRESSION: ICD-10-CM

## 2018-12-03 DIAGNOSIS — Z13.39 SCREENING FOR ALCOHOLISM: ICD-10-CM

## 2018-12-03 DIAGNOSIS — I10 ESSENTIAL HYPERTENSION, BENIGN: ICD-10-CM

## 2018-12-03 DIAGNOSIS — S88.912D AMPUTATION OF BOTH LOWER EXTREMITIES, SUBSEQUENT ENCOUNTER (HCC): ICD-10-CM

## 2018-12-03 NOTE — PROGRESS NOTES
SPORTS MEDICINE AND PRIMARY CARE Christine Simmons MD, [facility], [facility] [address] Phone:  [phone]  Fax: [phone] Chief Complaint Patient presents with Sissy.Sic Annual Wellness Visit SUBECTIVE: 
 
Woo Menendez is a 80 y.o. male Patient returns today with his wife with known history of bilateral AKA, status post cerebral aneurysm repair, primary hypertension, pancreatic mass, prediabetes and is seen for evaluation. He states that his left leg prosthesis is a good fit, they are still working on the right. Wife tells me he can stand and pivot and is independent with mobility from bed to chair, from chair to car, etc.  Patient himself denies new complaints and is seen for evaluation. Current Outpatient Medications Medication Sig Dispense Refill  donepezil (ARICEPT) 5 mg tablet Take 1 Tab by mouth nightly. 90 Tab 0  
 risperiDONE (RISPERDAL) 0.5 mg tablet TAKE 1 TABLET BY MOUTH NIGHTLY 90 Tab 0  
 tamsulosin (FLOMAX) 0.4 mg capsule TAKE ONE CAPSULE BY MOUTH DAILY 90 Cap 3  furosemide (LASIX) 40 mg tablet Take 1 tablet every morning 30 Tab 11 Past Medical History:  
Diagnosis Date  Amputation of both lower extremities (HCC)   
 tenderness and swollen left breeast  
 Aneurysm ([address]  Conjunctivitis of right eye 08/30/2016  Gangrene ([address] 2011  
 bilateral amputation d/t gangrene  Hallucination  Hypertension  Pancreatic mass  Prediabetes  Renal failure  S/P colonoscopy 1-4-08  UTI (urinary tract infection)  Wound, open Past Surgical History:  
Procedure Laterality Date  ABDOMEN SURGERY PROC UNLISTED    
 colonoscopy  CARDIAC SURG PROCEDURE UNLIST  COLONOSCOPY N/A 8/7/2018 COLONOSCOPY performed by Mac Reinoso MD at [address] [place] [address] Rd    
 bilateral amputation  HX UROLOGICAL    
 left orchiectomy  NEUROLOGICAL PROCEDURE UNLISTED  7/2004  
 aneursym No Known Allergies REVIEW OF SYSTEMS: 
 Blank 2 second file. Social History Socioeconomic History  Marital status:  Spouse name: Not on file  Number of children: Not on file  Years of education: Not on file  Highest education level: Not on file Tobacco Use  Smoking status: Former Smoker Last attempt to quit: 2000 Years since quittin.3  Smokeless tobacco: Never Used Substance and Sexual Activity  Alcohol use: Yes Comment: ocassional  
 Drug use: No  
 Sexual activity: Yes  
  Partners: Female  
r Family History Problem Relation Age of Onset  No Known Problems Father OBJECTIVE: 
Visit Vitals /71 Pulse 81 Temp 97.7 °F (36.5 °C) (Oral) Resp 18 Ht 5' 7\" (1.702 m) Wt 102 lb 4.8 oz (46.4 kg) SpO2 98% BMI 16.02 kg/m² ENT: perrla,  eom intact NECK: supple. Thyroid normal 
CHEST: clear to ascultation and percussion HEART: regular rate and rhythm ABD: soft, bowel sounds active EXTREMITIES: no edema, pulse 1+ No visits with results within 3 Month(s) from this visit. Latest known visit with results is:  
Office Visit on 2018 Component Date Value Ref Range Status  Glucose 2018 92  65 - 99 mg/dL Final  
 BUN 2018 20  8 - 27 mg/dL Final  
 Creatinine 2018 1.06  0.76 - 1.27 mg/dL Final  
 GFR est non-AA 2018 72  >59 mL/min/1.73 Final  
 GFR est AA 2018 84  >59 mL/min/1.73 Final  
 BUN/Creatinine ratio 2018 19  10 - 24 Final  
 Sodium 2018 141  134 - 144 mmol/L Final  
 Potassium 2018 4.7  3.5 - 5.2 mmol/L Final  
 Chloride 2018 103  96 - 106 mmol/L Final  
 CO2 2018 23  20 - 29 mmol/L Final  
 Calcium 2018 9.4  8.6 - 10.2 mg/dL Final  
 
  
 
ASSESSMENT: 
1. Medicare annual wellness visit, subsequent 2. Encounter for immunization 3. Screening for alcoholism 4. Screening for depression 5. Essential hypertension 6. Amputation of both lower extremities, subsequent encounter ([facility] 7. Stage 3 chronic kidney disease ([facility] 8. Nephrotic syndrome 9. Anasarca 10. Essential hypertension, benign 11. S/P cerebral aneurysm repair Patient's medical progress is satisfactory. I am extremely pleased he is not having hallucinations anymore. He is independent with ADLs now, which is very fortunate. His blood pressure control is at goal.  Nephrotic syndrome seems to be stabilizing or actually improving. He will return to the office in three to four months, sooner if he has any problems. I have discussed the diagnosis with the patient and the intended plan as seen in the 
orders above. The patient understands and agees with the plan. The patient has  
received an after visit summary and questions were answered concerning 
future plans Patient labs and/or xrays were reviewed Past records were reviewed. PLAN: 
. Orders Placed This Encounter  Depression Screen Annual  
 Influenza virus vaccine (FLUZONE HIGH-DOSE) 72 years and older Follow-up Disposition: 
Return in about 4 weeks (around 12/31/2018). ATTENTION:  
This medical record was transcribed using an electronic medical records system. Although proofread, it may and can contain electronic and spelling errors. Other human spelling and other errors may be present. Corrections may be executed at a later time. Please feel free to contact us for any clarifications as needed.

## 2018-12-03 NOTE — PATIENT INSTRUCTIONS
Vaccine Information Statement Influenza (Flu) Vaccine (Inactivated or Recombinant): What you need to know Many Vaccine Information Statements are available in [place] and other languages. See www.immunize.org/vis Hojas de Información Sobre Vacunas están disponibles en Español y en muchos otros idiomas. Visite www.immunize.org/vis 1. Why get vaccinated? Influenza (flu) is a contagious disease that spreads around the [place] every year, usually between October and May. Flu is caused by influenza viruses, and is spread mainly by coughing, sneezing, and close contact. Anyone can get flu. Flu strikes suddenly and can last several days. Symptoms vary by age, but can include: 
 fever/chills  sore throat  muscle aches  fatigue  cough  headache  runny or stuffy nose Flu can also lead to pneumonia and blood infections, and cause diarrhea and seizures in children. If you have a medical condition, such as heart or lung disease, flu can make it worse. Flu is more dangerous for some people. Infants and young children, people 72years of age and older, pregnant women, and people with certain health conditions or a weakened immune system are at greatest risk. Each year thousands of people in the [facility] die from flu, and many more are hospitalized. Flu vaccine can: 
 keep you from getting flu, 
 make flu less severe if you do get it, and 
 keep you from spreading flu to your family and other people. 2. Inactivated and recombinant flu vaccines A dose of flu vaccine is recommended every flu season. Children 6 months through 6years of age may need two doses during the same flu season. Everyone else needs only one dose each flu season.   
 
 
Some inactivated flu vaccines contain a very small amount of a mercury-based preservative called thimerosal. Studies have not shown thimerosal in vaccines to be harmful, but flu vaccines that do not contain thimerosal are available. There is no live flu virus in flu shots. They cannot cause the flu. There are many flu viruses, and they are always changing. Each year a new flu vaccine is made to protect against three or four viruses that are likely to cause disease in the upcoming flu season. But even when the vaccine doesnt exactly match these viruses, it may still provide some protection Flu vaccine cannot prevent: 
 flu that is caused by a virus not covered by the vaccine, or 
 illnesses that look like flu but are not. It takes about 2 weeks for protection to develop after vaccination, and protection lasts through the flu season. 3. Some people should not get this vaccine Tell the person who is giving you the vaccine:  If you have any severe, life-threatening allergies. If you ever had a life-threatening allergic reaction after a dose of flu vaccine, or have a severe allergy to any part of this vaccine, you may be advised not to get vaccinated. Most, but not all, types of flu vaccine contain a small amount of egg protein.  If you ever had Guillain-Barré Syndrome (also called GBS). Some people with a history of GBS should not get this vaccine. This should be discussed with your doctor.  If you are not feeling well. It is usually okay to get flu vaccine when you have a mild illness, but you might be asked to come back when you feel better. 4. Risks of a vaccine reaction With any medicine, including vaccines, there is a chance of reactions. These are usually mild and go away on their own, but serious reactions are also possible. Most people who get a flu shot do not have any problems with it. Minor problems following a flu shot include:  
 soreness, redness, or swelling where the shot was given  hoarseness  sore, red or itchy eyes  cough  fever  aches  headache  itching  fatigue If these problems occur, they usually begin soon after the shot and last 1 or 2 days. More serious problems following a flu shot can include the following:  There may be a small increased risk of Guillain-Barré Syndrome (GBS) after inactivated flu vaccine. This risk has been estimated at 1 or 2 additional cases per million people vaccinated. This is much lower than the risk of severe complications from flu, which can be prevented by flu vaccine.  Young children who get the flu shot along with pneumococcal vaccine (PCV13) and/or DTaP vaccine at the same time might be slightly more likely to have a seizure caused by fever. Ask your doctor for more information. Tell your doctor if a child who is getting flu vaccine has ever had a seizure. Problems that could happen after any injected vaccine:  People sometimes faint after a medical procedure, including vaccination. Sitting or lying down for about 15 minutes can help prevent fainting, and injuries caused by a fall. Tell your doctor if you feel dizzy, or have vision changes or ringing in the ears.  Some people get severe pain in the shoulder and have difficulty moving the arm where a shot was given. This happens very rarely.  Any medication can cause a severe allergic reaction. Such reactions from a vaccine are very rare, estimated at about 1 in a million doses, and would happen within a few minutes to a few hours after the vaccination. As with any medicine, there is a very remote chance of a vaccine causing a serious injury or death. The safety of vaccines is always being monitored. For more information, visit: www.cdc.gov/vaccinesafety/ 
 
5. What if there is a serious reaction? What should I look for?  Look for anything that concerns you, such as signs of a severe allergic reaction, very high fever, or unusual behavior.  
 
Signs of a severe allergic reaction can include hives, swelling of the face and throat, difficulty breathing, a fast heartbeat, dizziness, and weakness  usually within a few minutes to a few hours after the vaccination. What should I do?  If you think it is a severe allergic reaction or other emergency that cant wait, call 9-1-1 and get the person to the nearest hospital. Otherwise, call your doctor.  Reactions should be reported to the Vaccine Adverse Event Reporting System (VAERS). Your doctor should file this report, or you can do it yourself through  the VAERS web site at www.vaers. [facility].gov, or by calling 9-863.175.9598. VAERS does not give medical advice. 6. The National Vaccine Injury Compensation Program 
 
The [facility] Vaccine Injury Compensation Program (VICP) is a federal program that was created to compensate people who may have been injured by certain vaccines. Persons who believe they may have been injured by a vaccine can learn about the program and about filing a claim by calling 9-788.369.1150 or visiting the [address] [employer] website at www.[facility].gov/vaccinecompensation. There is a time limit to file a claim for compensation. 7. How can I learn more?  Ask your healthcare provider. He or she can give you the vaccine package insert or suggest other sources of information.  Call your local or state health department.  Contact the Centers for Disease Control and Prevention (CDC): 
- Call 1-838.412.2671 (4-883-XIR-INFO) or 
- Visit CDCs website at www.cdc.gov/flu Vaccine Information Statement Inactivated Influenza Vaccine 8/7/2015 
42 SAV Alonso Diego 989UL-29 Department of Health and M87 Centers for Disease Control and Prevention Office Use Only Medicare Wellness Visit, Male The best way to live healthy is to have a lifestyle where you eat a well-balanced diet, exercise regularly, limit alcohol use, and quit all forms of tobacco/nicotine, if applicable. Regular preventive services are another way to keep healthy.  Preventive services (vaccines, screening tests, monitoring & exams) can help personalize your care plan, which helps you manage your own care. Screening tests can find health problems at the earliest stages, when they are easiest to treat. [address] [facility] follows the current, evidence-based guidelines published by the [facility] Connolly ([facility]) when recommending preventive services for our patients. Because we follow these guidelines, sometimes recommendations change over time as research supports it. (For example, a prostate screening blood test is no longer routinely recommended for men with no symptoms.) Of course, you and your doctor may decide to screen more often for some diseases, based on your risk and co-morbidities (chronic disease you are already diagnosed with). Preventive services for you include: - Medicare offers their members a free annual wellness visit, which is time for you and your primary care provider to discuss and plan for your preventive service needs. Take advantage of this benefit every year! 
-All adults over age 72 should receive the recommended pneumonia vaccines. Current USPSTF guidelines recommend a series of two vaccines for the best pneumonia protection.  
-All adults should have a flu vaccine yearly and an ECG.  All adults age 61 and older should receive a shingles vaccine once in their lifetime.   
-All adults age 38-68 who are overweight should have a diabetes screening test once every three years.  
-Other screening tests & preventive services for persons with diabetes include: an eye exam to screen for diabetic retinopathy, a kidney function test, a foot exam, and stricter control over your cholesterol.  
-Cardiovascular screening for adults with routine risk involves an electrocardiogram (ECG) at intervals determined by the provider.  
-Colorectal cancer screening should be done for adults age 54-65 with no increased risk factors for colorectal cancer. There are a number of acceptable methods of screening for this type of cancer. Each test has its own benefits and drawbacks. Discuss with your provider what is most appropriate for you during your annual wellness visit. The different tests include: colonoscopy (considered the best screening method), a fecal occult blood test, a fecal DNA test, and sigmoidoscopy. 
-All adults born between [facility] should be screened once for Hepatitis C. 
-An Abdominal Aortic Aneurysm (AAA) Screening is recommended for men age 73-68 who has ever smoked in their lifetime. Here is a list of your current Health Maintenance items (your personalized list of preventive services) with a due date: 
Health Maintenance Due Topic Date Due  
 Flu Vaccine  08/01/2018 [facility] Annual Well Visit  10/26/2018  Glaucoma Screening   12/12/2018

## 2018-12-03 NOTE — PROGRESS NOTES
1. Have you been to the ER, urgent care clinic since your last visit? Hospitalized since your last visit? No 
 
2. Have you seen or consulted any other health care providers outside of the [address] [place] [address] since your last visit? Include any pap smears or colon screening. No 
This is the Subsequent Medicare Annual Wellness Exam, performed 12 months or more after the Initial AWV or the last Subsequent AWV I have reviewed the patient's medical history in detail and updated the computerized patient record. History Past Medical History:  
Diagnosis Date  Amputation of both lower extremities (HCC)   
 tenderness and swollen left breeast  
 Aneur[facility]m ([facility]  Conjunctivitis of right eye 08/30/2016  Gangrene ([facility] 2011  
 bilateral amputation d/t gangrene  Hallucination  Hypertension  Pancreatic mass  Prediabetes  Renal failure  S/P colonoscopy 1-4-08  UTI (urinary tract infection)  Wound, open Past Surgical History:  
Procedure Laterality Date  ABDOMEN SURGERY PROC UNLISTED    
 colonoscopy  CARDIAC SURG PROCEDURE UNLIST  COLONOSCOPY N/A 8/7/2018 COLONOSCOPY performed by Ira Rodriguez MD at [address] [place] [address] Rd    
 bilateral amputation  HX UROLOGICAL    
 left orchiectomy  NEUROLOGICAL PROCEDURE UNLISTED  7/2004  
 aneursym Current Outpatient Medications Medication Sig Dispense Refill  donepezil (ARICEPT) 5 mg tablet Take 1 Tab by mouth nightly. 90 Tab 0  
 risperiDONE (RISPERDAL) 0.5 mg tablet TAKE 1 TABLET BY MOUTH NIGHTLY 90 Tab 0  
 tamsulosin (FLOMAX) 0.4 mg capsule TAKE ONE CAPSULE BY MOUTH DAILY 90 Cap 3  furosemide (LASIX) 40 mg tablet Take 1 tablet every morning 30 Tab 11 No Known Allergies Family History Problem Relation Age of Onset  No Known Problems Father Social History Tobacco Use  Smoking status: Former Smoker Last attempt to quit: 8/7/2000 Years since quittin.3  Smokeless tobacco: Never Used Substance Use Topics  Alcohol use: Yes Comment: ocassional  
 
Patient Active Problem List  
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  
 Edema extremities R60.0  Pancreatic mass K86.9  CKD (chronic kidney disease) N18.9  Nephrotic syndrome N04.9  Anasarca R60.1  Hypoalbuminemia E88.09  
 Hypertension I10  
 [facility] J92.703V, J08.410N  Renal failure N19  
 Prediabetes R73.03  
 Hallucination R44.3 Depression Risk Factor Screening: PHQ over the last two weeks 12/3/2018 PHQ Not Done - Little interest or pleasure in doing things Not at all Feeling down, depressed, irritable, or hopeless Not at all Total Score PHQ 2 0 Trouble falling or staying asleep, or sleeping too much - Feeling tired or having little energy - Poor appetite, weight loss, or overeating - Feeling bad about yourself - or that you are a failure or have let yourself or your family down - Trouble concentrating on things such as school, work, reading, or watching TV - Moving or speaking so slowly that other people could have noticed; or the opposite being so fidgety that others notice - Thoughts of being better off dead, or hurting yourself in some way -  
PHQ 9 Score - How difficult have these problems made it for you to do your work, take care of your home and get along with others - Alcohol Risk Factor Screening: You do not drink alcohol or very rarely. Functional Ability and Level of Safety:  
Hearing Loss Hearing is good. Activities of Daily Living The home contains: graVTM bars Patient does total self care Fall Risk Fall Risk Assessment, last 12 mths 12/3/2018 Able to walk? No  
Fall in past 12 months? -  
 
 
Abuse Screen Patient is not abused Cognitive Screening Evaluation of Cognitive Function: Has your family/caregiver stated any concerns about your memory: no 
Normal 
 
Patient Care Team  
Patient Care Team: 
Ronn Taylor MD as PCP - General (Internal Medicine) Assessment/Plan Education and counseling provided: 
Are appropriate based on today's review and evaluation Diagnoses and all orders for this visit: 1. Encounter for immunization 
-     INFLUENZA VIRUS VACCINE, HIGH DOSE SEASONAL, PRESERVATIVE FREE 
-     TX IMMUNIZ ADMIN,1 SINGLE/COMB VAC/TOXOID Health Maintenance Due Topic Date Due  Influenza Age 5 to Adult  08/01/2018  MEDICARE YEARLY EXAM  10/26/2018  GLAUCOMA SCREENING Q2Y  12/12/2018

## 2019-01-02 RX ORDER — RISPERIDONE 0.5 MG/1
TABLET, FILM COATED ORAL
Qty: 90 TAB | Refills: 0 | Status: SHIPPED | OUTPATIENT
Start: 2019-01-02 | End: 2019-01-25 | Stop reason: SDUPTHER

## 2019-01-25 ENCOUNTER — OFFICE VISIT (OUTPATIENT)
Dept: BEHAVIORAL/MENTAL HEALTH CLINIC | Age: 69
End: 2019-01-25

## 2019-01-25 VITALS
SYSTOLIC BLOOD PRESSURE: 136 MMHG | HEIGHT: 67 IN | DIASTOLIC BLOOD PRESSURE: 72 MMHG | HEART RATE: 75 BPM | BODY MASS INDEX: 16.02 KG/M2

## 2019-01-25 DIAGNOSIS — F03.91 DEMENTIA WITH BEHAVIORAL DISTURBANCE, UNSPECIFIED DEMENTIA TYPE: Primary | ICD-10-CM

## 2019-01-25 RX ORDER — RISPERIDONE 0.5 MG/1
TABLET, FILM COATED ORAL
Qty: 30 TAB | Refills: 2 | Status: SHIPPED | OUTPATIENT
Start: 2019-01-25 | End: 2019-01-28 | Stop reason: SDUPTHER

## 2019-01-25 RX ORDER — DONEPEZIL HYDROCHLORIDE 5 MG/1
5 TABLET, FILM COATED ORAL
Qty: 30 TAB | Refills: 2 | Status: SHIPPED | OUTPATIENT
Start: 2019-01-25 | End: 2019-04-25 | Stop reason: SDUPTHER

## 2019-01-25 NOTE — PROGRESS NOTES
CHIEF COMPLAINT:  Nguyen Abbasi is a 80 y.o. male and was seen today for follow-up of psychiatric condition and psychotropic medication management. Last office visit was 11/26/18. HPI:    Eri Dickerson reports the following psychiatric symptoms: poor memory, hx hallucinations. The symptoms have been present for about a year and are of moderate severity. Patient is accompanied by his spouse today. Reports his memory symptoms remain unchanged. Patient and spouse report no hallucinations. Spouse reports patient \"talks and laughs to himself at night sometimes\". Spouse and patient report he is sleeping well, good appetite, good energy, fair concentration and normal psychomotor. Spouse and patient report no agitation. Patient denies SI/HI/SIB/AVH. Denies any symptoms of shagufta or psychosis. No reports of anxiety. SCALES:  8/20/2018  PHQ-9: 2, suggesting minimal or no amount of depression. HAM-A: 7, indicating mild anxiety  MOCA: 18/30, indicating cognitive disorder. GDS: 3, indicating no depression    Past Psychiatric History:  Meds:   Current:   Risperdal 0.25 mg at bedtime  Aricept 5 mg at bedtime  Past:  Haldol  Cogentin  Remeron 15 mg    Outpt Treatment: Past: none  Past Hospitalizations: none  Suicide attempts: no    Family hx of suicide: No  Self injurious behaviors: none    ETOH: current: none. Did drink alcohol in past.  ILLICIT: denies  TOBACCO: Former smoker x 25 years. None current.   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 Father  65 - unknown reason.         Social History:  Family Dynamics: . Mother who is living and doing well. Has 2 sisters and 2 brothers. Reports he gets along with his family and they are a good support system for him. 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  Follows [employer].       Visit Vitals  /72   Pulse 75   Ht 5' 7\" (1.702 m)   BMI 16.02 kg/m²       LABS:   Results for orders placed or performed in visit on    METABOLIC PANEL, BASIC   Result Value Ref Range    Glucose 92 65 - 99 mg/dL    BUN 20 8 - 27 mg/dL    Creatinine 1.06 0.76 - 1.27 mg/dL    GFR est non-AA 72 >59 mL/min/1.73    GFR est AA 84 >59 mL/min/1.73    BUN/Creatinine ratio 19 10 - 24    Sodium 141 134 - 144 mmol/L    Potassium 4.7 3.5 - 5.2 mmol/L    Chloride 103 96 - 106 mmol/L    CO2 23 20 - 29 mmol/L    Calcium 9.4 8.6 - 10.2 mg/dL            REVIEW OF SYSTEMS:  Psychiatric:  memory loss (no change)  Appetite:good   Sleep: good  Constitutional: no fever, no night sweats, no increased weakness  MS: no increased weakness, no muscle stiffness, non-ambulatory and uses wheelchair, no tremors, no EPS symptoms      Side Effects:  none    MENTAL STATUS EXAM:   Sensorium  Alert and Oriented x 2   Relations cooperative, pleasant   Appearance:  age appropriate and casually dressed   Motor Behavior:  within normal limits and non-ambulatory, uses wheelchair   Speech:  normal pitch and normal volume   Thought Process: goal directed and logical   Thought Content free of delusions, free of hallucinations and not internally preoccupied    Suicidal ideations none   Homicidal ideations none   Mood:  stable   Affect:  stable   Memory recent  impaired   Memory remote:  impaired   Concentration:  adequate   Abstraction:  abstract   Insight:  limited   Reliability fair/good   Judgment:  fair     MEDICAL DECISION MAKING:  Problems addressed today:    ICD-10-CM ICD-9-CM    1. Dementia with behavioral disturbance, unspecified dementia type F03.91 294.21          Assessment:   Sara Mejia is responding to treatment, symptoms are improved. Spouse and patient report no hallucinations and his behavior has been stable and significantly improved since starting risperidone and Aricept. Current Outpatient Medications   Medication Sig Dispense Refill    risperiDONE (RISPERDAL) 0.5 mg tablet TAKE 1 TABLET BY MOUTH NIGHTLY 30 Tab 2    donepezil (ARICEPT) 5 mg tablet Take 1 Tab by mouth nightly. 30 Tab 2    tamsulosin (FLOMAX) 0.4 mg capsule TAKE ONE CAPSULE BY MOUTH DAILY 90 Cap 3    furosemide (LASIX) 40 mg tablet Take 1 tablet every morning 30 Tab 11       Plan:   1. Medications/Labs: Continue risperiDONE 0.5 mg at bedtime for psychosis (hallucinations and agitation). Continue Aricept 5 mg at bedtime for dementia. Reviewed the side effects, risks, benefits and options of medications with patient. Reviewed the increased risk of CV risks with antipsychotics, including stroke. Instructed patient/spouse to go to ED if has thoughts of hurting self or in danger of hurting self because of behavior. Patient/spouse given opportunity to ask questions. Patient/spouse instructed to call with any side effects, questions or concerns. 2.  Counseling and coordination of care including instructions for treatment, risks/benefits, risk factor reduction and patient/family education. He and spouse agree with the plan. Patient/spouse instructed to call with any side effects, questions or issues. 3.  Follow-up Disposition:  Return in about 3 months (around 4/25/2019) for Med evaluation. Discussed that I am leaving the practice 1/25/2019.  Patient requests to continue his care at [facility]. Appointment scheduled with Dr. Keven Chua for 4/25/2019.     Myels Lares NP  1/25/2019

## 2019-04-01 ENCOUNTER — OFFICE VISIT (OUTPATIENT)
Dept: INTERNAL MEDICINE CLINIC | Age: 69
End: 2019-04-01

## 2019-04-01 VITALS
HEIGHT: 67 IN | RESPIRATION RATE: 18 BRPM | BODY MASS INDEX: 16.02 KG/M2 | HEART RATE: 84 BPM | TEMPERATURE: 97.3 F | OXYGEN SATURATION: 97 % | DIASTOLIC BLOOD PRESSURE: 76 MMHG | SYSTOLIC BLOOD PRESSURE: 122 MMHG

## 2019-04-01 DIAGNOSIS — I10 ESSENTIAL HYPERTENSION: Primary | ICD-10-CM

## 2019-04-01 DIAGNOSIS — N04.9 NEPHROTIC SYNDROME: ICD-10-CM

## 2019-04-01 DIAGNOSIS — N18.30 STAGE 3 CHRONIC KIDNEY DISEASE (HCC): ICD-10-CM

## 2019-04-01 DIAGNOSIS — I82.220: ICD-10-CM

## 2019-04-01 DIAGNOSIS — E88.09 HYPOALBUMINEMIA: ICD-10-CM

## 2019-04-01 PROBLEM — K40.90 HERNIA, INGUINAL, LEFT: Status: ACTIVE | Noted: 2019-04-01

## 2019-04-01 NOTE — PROGRESS NOTES
[facility] Peggy Shipley MD, [facility] [address] Phone:  [phone]  Fax: [phone] Chief Complaint Patient presents with  Hypertension  
  f/u Mily Thomason SUBJECTIVE: 
  Stormy Rubinstein is a 80 y.o. male Patient returns today with his wife with known history of bilateral AKA, aneurysm, hallucinations, hypertension, prediabetes and proteinuria with __________. She brings a list of activities of daily living that he can and cannot do. He can make up the bed, get mail, scoop up snow at door, get bathwater and bathe, he can shave himself, fold his clothes and put them away, cooks his breakfast if desires, he can iron his clothes if he needs, sweep floors, dance in chair at functions, sing in two choirs and wash pans when he has to cook. The rest of his ADLs he needs assistance with, including toileting and assistance with mobility. Patient is seen for evaluation. Current Outpatient Medications Medication Sig Dispense Refill  risperiDONE (RISPERDAL) 0.5 mg tablet TAKE 1 TABLET BY MOUTH NIGHTLY 90 Tab 0  
 donepezil (ARICEPT) 5 mg tablet Take 1 Tab by mouth nightly. 30 Tab 2  
 tamsulosin (FLOMAX) 0.4 mg capsule TAKE ONE CAPSULE BY MOUTH DAILY 90 Cap 3 Past Medical History:  
Diagnosis Date  Amputation of both lower extremities (HCC)   
 tenderness and swollen left breeast  
 Aneurysm ([address]  Conjunctivitis of right eye 08/30/2016  Gangrene ([facility] [address] 2011  
 bilateral amputation d/t gangrene  Hallucination  Hernia, inguinal, left 04/01/2019  Hypertension  Pancreatic mass  Prediabetes  Renal failure  S/P colonoscopy 1-4-08  UTI (urinary tract infection)  Wound, open Past Surgical History:  
Procedure Laterality Date  ABDOMEN SURGERY PROC UNLISTED    
 colonoscopy  CARDIAC SURG PROCEDURE UNLIST  COLONOSCOPY N/A 8/7/2018 COLONOSCOPY performed by Rob Diggs MD at [address] [place] [address] Rd    
 bilateral amputation  HX UROLOGICAL    
 left orchiectomy  NEUROLOGICAL PROCEDURE UNLISTED  2004  
 aneursym No Known Allergies REVIEW OF SYSTEMS: 
General: negative for - chills or fever ENT: negative for - headaches, nasal congestion or tinnitus Respiratory: negative for - cough, hemoptysis, shortness of breath or wheezing Cardiovascular : negative for - chest pain, edema, palpitations or shortness of breath Gastrointestinal: negative for - abdominal pain, blood in stools, heartburn or nausea/vomiting Genito-Urinary: no dysuria, trouble voiding, or hematuria Musculoskeletal: negative for - gait disturbance, joint pain, joint stiffness or joint swelling Neurological: no TIA or stroke symptoms Hematologic: no bruises, no bleeding, no swollen glands Integument: no lumps, mole changes, nail changes or rash Endocrine: no malaise/lethargy or unexpected weight changes Social History Socioeconomic History  Marital status:  Spouse name: Not on file  Number of children: Not on file  Years of education: Not on file  Highest education level: Not on file Tobacco Use  Smoking status: Former Smoker Last attempt to quit: 2000 Years since quittin.6  Smokeless tobacco: Never Used Substance and Sexual Activity  Alcohol use: Yes Comment: ocassional  
 Drug use: No  
 Sexual activity: Not Currently Partners: Female Family History Problem Relation Age of Onset  No Known Problems Father OBJECTIVE: 
 
Visit Vitals /76 Pulse 84 Temp 97.3 °F (36.3 °C) (Oral) Resp 18 Ht 5' 7\" (1.702 m) SpO2 97% BMI 16.02 kg/m² CONSTITUTIONAL: well , well nourished, appears age appropriate EYES: perrla, eom intact ENMT:moist mucous membranes, pharynx clear NECK: supple. Thyroid normal 
RESPIRATORY: Chest: clear bilaterally CARDIOVASCULAR: Heart: regular rate and rhythm GASTROINTESTINAL: Abdomen: soft, bowel sounds active HEMATOLOGIC: no pathological lymph nodes palpated MUSCULOSKELETAL: Extremities: no edema, pulse 1+ INTEGUMENT: No unusual rashes or suspicious skin lesions noted. Nails appear normal. 
NEUROLOGIC: non-focal exam  
MENTAL STATUS: alert and oriented, appropriate affect ASSESSMENT: 
1. Essential hypertension 2. Hypoalbuminemia 3. Inferior vena cava embolism (HCC) 4. Nephrotic syndrome 5. Stage 3 chronic kidney disease ([facility] [address] The complaint of swelling is left groin, compatible with inguinal hernia. We do not suggest repair unless it becomes painful, and we suggest he try to stop straining when he moves his bowels by increasing grains and fluids, as well as fruit drinks. The hallucinations during the day are resolved. He talks to an invisible friend while sleeping at night. His renal function has been stable. Will check it again today to confirm stability. He will be back to see us in about four months, sooner if he has any problems. I have discussed the diagnosis with the patient and the intended plan as seen in the 
orders above. The patient understands and agees with the plan. The patient has  
received an after visit summary and questions were answered concerning 
future plans Patient labs and/or xrays were reviewed Past records were reviewed. PLAN: 
. Orders Placed This Encounter  RENAL FUNCTION PANEL  
 REFERRAL TO OPHTHALMOLOGY Follow-up and Dispositions · Return in about 4 months (around 8/1/2019). ATTENTION:  
This medical record was transcribed using an electronic medical records system. Although proofread, it may and can contain electronic and spelling errors. Other human spelling and other errors may be present. Corrections may be executed at a later time. Please feel free to contact us for any clarifications as needed.

## 2019-04-01 NOTE — PROGRESS NOTES
1. Have you been to the ER, urgent care clinic since your last visit? Hospitalized since your last visit? No 
 
2. Have you seen or consulted any other health care providers outside of the [address] [place] [address] since your last visit? Include any pap smears or colon screening.  No

## 2019-04-02 LAB
ALBUMIN SERPL-MCNC: 4.4 G/DL (ref 3.6–4.8)
APPEARANCE UR: CLEAR
BILIRUB UR QL STRIP: NEGATIVE
BUN SERPL-MCNC: 17 MG/DL (ref 8–27)
BUN/CREAT SERPL: 15 (ref 10–24)
CALCIUM SERPL-MCNC: 10.1 MG/DL (ref 8.6–10.2)
CHLORIDE SERPL-SCNC: 103 MMOL/L (ref 96–106)
CO2 SERPL-SCNC: 24 MMOL/L (ref 20–29)
COLOR UR: YELLOW
CREAT SERPL-MCNC: 1.11 MG/DL (ref 0.76–1.27)
CREAT UR-MCNC: 121.6 MG/DL
GLUCOSE SERPL-MCNC: 98 MG/DL (ref 65–99)
GLUCOSE UR QL: NEGATIVE
HGB UR QL STRIP: NEGATIVE
KETONES UR QL STRIP: NEGATIVE
LEUKOCYTE ESTERASE UR QL STRIP: NEGATIVE
MICRO URNS: NORMAL
NITRITE UR QL STRIP: NEGATIVE
PH UR STRIP: 5.5 [PH] (ref 5–7.5)
PHOSPHATE SERPL-MCNC: 3.4 MG/DL (ref 2.5–4.5)
POTASSIUM SERPL-SCNC: 4.5 MMOL/L (ref 3.5–5.2)
PROT UR QL STRIP: NEGATIVE
PROT UR-MCNC: 24.4 MG/DL
PROT/CREAT UR: 201 MG/G CREAT (ref 0–200)
SODIUM SERPL-SCNC: 143 MMOL/L (ref 134–144)
SP GR UR: 1.02 (ref 1–1.03)
UROBILINOGEN UR STRIP-MCNC: 0.2 MG/DL (ref 0.2–1)

## 2019-04-25 ENCOUNTER — OFFICE VISIT (OUTPATIENT)
Dept: BEHAVIORAL/MENTAL HEALTH CLINIC | Age: 69
End: 2019-04-25

## 2019-04-25 VITALS
BODY MASS INDEX: 16.02 KG/M2 | HEIGHT: 67 IN | HEART RATE: 85 BPM | DIASTOLIC BLOOD PRESSURE: 75 MMHG | SYSTOLIC BLOOD PRESSURE: 140 MMHG

## 2019-04-25 DIAGNOSIS — F02.818 DEMENTIA DUE TO ANOTHER GENERAL MEDICAL CONDITION, WITH BEHAVIORAL DISTURBANCE: Primary | ICD-10-CM

## 2019-04-25 RX ORDER — RISPERIDONE 0.5 MG/1
0.5 TABLET, FILM COATED ORAL
Qty: 90 TAB | Refills: 1 | Status: SHIPPED | OUTPATIENT
Start: 2019-04-25 | End: 2019-09-05 | Stop reason: SDUPTHER

## 2019-04-25 RX ORDER — DONEPEZIL HYDROCHLORIDE 5 MG/1
5 TABLET, FILM COATED ORAL
Qty: 90 TAB | Refills: 1 | Status: SHIPPED | OUTPATIENT
Start: 2019-04-25 | End: 2019-09-05 | Stop reason: SDUPTHER
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
## 2019-08-01 ENCOUNTER — OFFICE VISIT (OUTPATIENT)
Dept: INTERNAL MEDICINE CLINIC | Age: 69
End: 2019-08-01

## 2019-08-01 VITALS
HEIGHT: 67 IN | RESPIRATION RATE: 18 BRPM | OXYGEN SATURATION: 97 % | HEART RATE: 78 BPM | TEMPERATURE: 98.1 F | WEIGHT: 95.6 LBS | DIASTOLIC BLOOD PRESSURE: 84 MMHG | BODY MASS INDEX: 15 KG/M2 | SYSTOLIC BLOOD PRESSURE: 135 MMHG

## 2019-08-01 DIAGNOSIS — F02.818 DEMENTIA DUE TO GENERAL MEDICAL CONDITION WITH BEHAVIORAL DISTURBANCE: ICD-10-CM

## 2019-08-01 DIAGNOSIS — R79.9 ABNORMAL FINDING OF BLOOD CHEMISTRY: ICD-10-CM

## 2019-08-01 DIAGNOSIS — R35.1 NOCTURIA: ICD-10-CM

## 2019-08-01 DIAGNOSIS — N04.9 NEPHROTIC SYNDROME: ICD-10-CM

## 2019-08-01 DIAGNOSIS — Z98.890 S/P COLONOSCOPY: ICD-10-CM

## 2019-08-01 DIAGNOSIS — I10 ESSENTIAL HYPERTENSION, BENIGN: Primary | ICD-10-CM

## 2019-08-01 DIAGNOSIS — N18.30 STAGE 3 CHRONIC KIDNEY DISEASE (HCC): ICD-10-CM

## 2019-08-01 NOTE — PROGRESS NOTES
1. Have you been to the ER, urgent care clinic since your last visit? Hospitalized since your last visit? No    2. Have you seen or consulted any other health care providers outside of the [address] [place] [address] since your last visit? Include any pap smears or colon screening.  No     Wants to discuss medication refill

## 2019-08-01 NOTE — PROGRESS NOTES
SPORTS MEDICINE AND PRIMARY CARE  Margie Clement MD, [address] [address]  Phone:  [phone]  Fax: [phone]       Chief Complaint   Patient presents with    Hypertension     f/u   . SUBJECTIVE:    Divya Marion is a 77 y.o. male Patient returns today with known history of primary hypertension, nephrotic syndrome, CKD and is seen for evaluation. Patient returns today with his wife. There are no new complaints. Since he has changed medications under the auspices of Julieta Mooney, who he last saw on 04/25/19, who found that he had dementia with behavioral disturbances and placed him on Risperdal 0.5 mg q.h.s. and Aricept 5 mg q.h.s., counseling with psychotherapy was provided, and he now comes in for follow up. He has no new complaints. His wife notes that he is doing better with the ______________. Current Outpatient Medications   Medication Sig Dispense Refill    risperiDONE (RISPERDAL) 0.5 mg tablet Take 1 Tab by mouth nightly. 90 Tab 1    donepezil (ARICEPT) 5 mg tablet Take 1 Tab by mouth nightly.  90 Tab 1    tamsulosin (FLOMAX) 0.4 mg capsule TAKE ONE CAPSULE BY MOUTH DAILY 90 Cap 3     Past Medical History:   Diagnosis Date    Amputation of both lower extremities (HCC)     tenderness and swollen left breeast    Aneurysm ([facility] [address]     Conjunctivitis of right eye 08/30/2016    Gangrene ([facility] [address] 2011    bilateral amputation d/t gangrene    Hallucination     Hernia, inguinal, left 04/01/2019    Hypertension     Pancreatic mass     Prediabetes     Renal failure     S/P colonoscopy 1-4-08 2/26/19    ryan repeat 5 yrs    UTI (urinary tract infection)     Wound, open      Past Surgical History:   Procedure Laterality Date    ABDOMEN SURGERY PROC UNLISTED      colonoscopy    CARDIAC SURG PROCEDURE UNLIST      COLONOSCOPY N/A 8/7/2018    COLONOSCOPY performed by Ashleigh Lea MD at [address] [place] [address]      bilateral amputation    HX UROLOGICAL      left orchiectomy    NEUROLOGICAL PROCEDURE UNLISTED  2004    aneursym     No Known Allergies      REVIEW OF SYSTEMS:  General: negative for - chills or fever  ENT: negative for - headaches, nasal congestion or tinnitus  Respiratory: negative for - cough, hemoptysis, shortness of breath or wheezing  Cardiovascular : negative for - chest pain, edema, palpitations or shortness of breath  Gastrointestinal: negative for - abdominal pain, blood in stools, heartburn or nausea/vomiting  Genito-Urinary: no dysuria, trouble voiding, or hematuria  Musculoskeletal: negative for - gait disturbance, joint pain, joint stiffness or joint swelling  Neurological: no TIA or stroke symptoms  Hematologic: no bruises, no bleeding, no swollen glands  Integument: no lumps, mole changes, nail changes or rash  Endocrine: no malaise/lethargy or unexpected weight changes      Social History     Socioeconomic History    Marital status:      Spouse name: Not on file    Number of children: Not on file    Years of education: Not on file    Highest education level: Not on file   Tobacco Use    Smoking status: Former Smoker     Last attempt to quit: 2000     Years since quittin.9    Smokeless tobacco: Never Used   Substance and Sexual Activity    Alcohol use: Yes     Comment: ocassional    Drug use: No    Sexual activity: Not Currently     Partners: Female     Family History   Problem Relation Age of Onset    No Known Problems Father        OBJECTIVE:    Visit Vitals  /84   Pulse 78   Temp 98.1 °F (36.7 °C) (Oral)   Resp 18   Ht 5' 7\" (1.702 m)   Wt 95 lb 9.6 oz (43.4 kg)   SpO2 97%   BMI 14.97 kg/m²     CONSTITUTIONAL: well , well nourished, appears age appropriate  EYES: perrla, eom intact  ENMT:moist mucous membranes, pharynx clear  NECK: supple.  Thyroid normal  RESPIRATORY: Chest: clear bilaterally   CARDIOVASCULAR: Heart: regular rate and rhythm  GASTROINTESTINAL: Abdomen: soft, bowel sounds active  HEMATOLOGIC: no pathological lymph nodes palpated  MUSCULOSKELETAL: Extremities: no edema, pulse 1+   INTEGUMENT: No unusual rashes or suspicious skin lesions noted. Nails appear normal.  NEUROLOGIC: non-focal exam   MENTAL STATUS: alert and oriented, appropriate affect           ASSESSMENT:  1. Essential hypertension, benign    2. Nephrotic syndrome    3. Stage 3 chronic kidney disease ([facility] [address]    4. S/P colonoscopy    5. Nocturia     6. Abnormal finding of blood chemistry     7. 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 BP is approaching goal.    Nephrotic syndrome has resolved, which was the impetus for his ____________. His CKD is also resolving, which is also very fortunate. We are concerned about his mental status and the diagnosis listed by the psychiatrist and hopefully Aricept will decrease the progression. Will ask his wife to ask the psychiatrist if the addition of Namenda might be helpful. He will be back to see us in four to six months, sooner if needed. Since we last saw him he also underwent a colonoscopy and polypectomy on 02/26/19. I have discussed the diagnosis with the patient and the intended plan as seen in the  orders above. The patient understands and agees with the plan. The patient has   received an after visit summary and questions were answered concerning  future plans  Patient labs and/or xrays were reviewed  Past records were reviewed. PLAN:  .  Orders Placed This Encounter    URINALYSIS W/ RFLX MICROSCOPIC    CBC WITH AUTOMATED DIFF    METABOLIC PANEL, COMPREHENSIVE    LIPID PANEL    PROSTATE SPECIFIC AG    HEMOGLOBIN A1C WITH EAG       Follow-up and Dispositions    · Return in about 4 months (around 12/1/2019). ATTENTION:   This medical record was transcribed using an electronic medical records system. Although proofread, it may and can contain electronic and spelling errors.   Other human spelling and other errors may be present. Corrections may be executed at a later time. Please feel free to contact us for any clarifications as needed. Discussed the patient's BMI with him. The BMI follow up plan is as follows:     weight gain advised  nutrition/feeding management  dietary management education, guidance, and counseling    An After Visit Summary was printed and given to the patient.

## 2019-08-01 NOTE — PATIENT INSTRUCTIONS
Body Mass Index: Care Instructions Your Care Instructions Body mass index (BMI) can help you see if your weight is raising your risk for health problems. It uses a formula to compare how much you weigh with how tall you are. · A BMI lower than 18.5 is considered underweight. · A BMI between 18.5 and 24.9 is considered healthy. · A BMI between 25 and 29.9 is considered overweight. A BMI of 30 or higher is considered obese. If your BMI is in the normal range, it means that you have a lower risk for weight-related health problems. If your BMI is in the overweight or obese range, you may be at increased risk for weight-related health problems, such as high blood pressure, heart disease, stroke, arthritis or joint pain, and diabetes. If your BMI is in the underweight range, you may be at increased risk for health problems such as fatigue, lower protection (immunity) against illness, muscle loss, bone loss, hair loss, and hormone problems. BMI is just one measure of your risk for weight-related health problems. You may be at higher risk for health problems if you are not active, you eat an unhealthy diet, or you drink too much alcohol or use tobacco products. Follow-up care is a key part of your treatment and safety. Be sure to make and go to all appointments, and call your doctor if you are having problems. It's also a good idea to know your test results and keep a list of the medicines you take. How can you care for yourself at home? · Practice healthy eating habits. This includes eating plenty of fruits, vegetables, whole grains, lean protein, and low-fat dairy. · If your doctor recommends it, get more exercise. Walking is a good choice. Bit by bit, increase the amount you walk every day. Try for at least 30 minutes on most days of the week. · Do not smoke. Smoking can increase your risk for health problems.  If you need help quitting, talk to your doctor about stop-smoking programs and medicines. These can increase your chances of quitting for good. · Limit alcohol to 2 drinks a day for men and 1 drink a day for women. Too much alcohol can cause health problems. If you have a BMI higher than 25 · Your doctor may do other tests to check your risk for weight-related health problems. This may include measuring the distance around your waist. A waist measurement of more than 40 inches in men or 35 inches in women can increase the risk of weight-related health problems. · Talk with your doctor about steps you can take to stay healthy or improve your health. You may need to make lifestyle changes to lose weight and stay healthy, such as changing your diet and getting regular exercise. If you have a BMI lower than 18.5 · Your doctor may do other tests to check your risk for health problems. · Talk with your doctor about steps you can take to stay healthy or improve your health. You may need to make lifestyle changes to gain or maintain weight and stay healthy, such as getting more healthy foods in your diet and doing exercises to build muscle. Where can you learn more? Go to http://yony-nancy.info/. Enter S176 in the search box to learn more about \"Body Mass Index: Care Instructions. \" Current as of: October 13, 2016 Content Version: 11.4 © 2076-1616 [employer]. Care instructions adapted under license by [employer] (which disclaims liability or warranty for this information). If you have questions about a medical condition or this instruction, always ask your healthcare professional. [address] any warranty or liability for your use of this information.

## 2019-08-02 LAB
ALBUMIN SERPL-MCNC: 4.4 G/DL (ref 3.6–4.8)
ALBUMIN/GLOB SERPL: 1.3 {RATIO} (ref 1.2–2.2)
ALP SERPL-CCNC: 70 IU/L (ref 39–117)
ALT SERPL-CCNC: 11 IU/L (ref 0–44)
APPEARANCE UR: CLEAR
AST SERPL-CCNC: 16 IU/L (ref 0–40)
BACTERIA #/AREA URNS HPF: ABNORMAL /[HPF]
BASOPHILS # BLD AUTO: 0.1 X10E3/UL (ref 0–0.2)
BASOPHILS NFR BLD AUTO: 1 %
BILIRUB SERPL-MCNC: 0.4 MG/DL (ref 0–1.2)
BILIRUB UR QL STRIP: NEGATIVE
BUN SERPL-MCNC: 18 MG/DL (ref 8–27)
BUN/CREAT SERPL: 16 (ref 10–24)
CALCIUM SERPL-MCNC: 9.8 MG/DL (ref 8.6–10.2)
CASTS URNS QL MICRO: ABNORMAL /LPF
CHLORIDE SERPL-SCNC: 101 MMOL/L (ref 96–106)
CHOLEST SERPL-MCNC: 215 MG/DL (ref 100–199)
CO2 SERPL-SCNC: 23 MMOL/L (ref 20–29)
COLOR UR: YELLOW
CREAT SERPL-MCNC: 1.16 MG/DL (ref 0.76–1.27)
EOSINOPHIL # BLD AUTO: 0.3 X10E3/UL (ref 0–0.4)
EOSINOPHIL NFR BLD AUTO: 7 %
EPI CELLS #/AREA URNS HPF: ABNORMAL /HPF (ref 0–10)
ERYTHROCYTE [DISTWIDTH] IN BLOOD BY AUTOMATED COUNT: 16.8 % (ref 12.3–15.4)
EST. AVERAGE GLUCOSE BLD GHB EST-MCNC: 117 MG/DL
GLOBULIN SER CALC-MCNC: 3.5 G/DL (ref 1.5–4.5)
GLUCOSE SERPL-MCNC: 81 MG/DL (ref 65–99)
GLUCOSE UR QL: NEGATIVE
HBA1C MFR BLD: 5.7 % (ref 4.8–5.6)
HCT VFR BLD AUTO: 39.8 % (ref 37.5–51)
HDLC SERPL-MCNC: 82 MG/DL
HGB BLD-MCNC: 13 G/DL (ref 13–17.7)
HGB UR QL STRIP: NEGATIVE
IMM GRANULOCYTES # BLD AUTO: 0 X10E3/UL (ref 0–0.1)
IMM GRANULOCYTES NFR BLD AUTO: 0 %
KETONES UR QL STRIP: NEGATIVE
LDLC SERPL CALC-MCNC: 121 MG/DL (ref 0–99)
LEUKOCYTE ESTERASE UR QL STRIP: ABNORMAL
LYMPHOCYTES # BLD AUTO: 1.8 X10E3/UL (ref 0.7–3.1)
LYMPHOCYTES NFR BLD AUTO: 37 %
MCH RBC QN AUTO: 25.4 PG (ref 26.6–33)
MCHC RBC AUTO-ENTMCNC: 32.7 G/DL (ref 31.5–35.7)
MCV RBC AUTO: 78 FL (ref 79–97)
MICRO URNS: ABNORMAL
MONOCYTES # BLD AUTO: 0.4 X10E3/UL (ref 0.1–0.9)
MONOCYTES NFR BLD AUTO: 8 %
MUCOUS THREADS URNS QL MICRO: PRESENT
NEUTROPHILS # BLD AUTO: 2.2 X10E3/UL (ref 1.4–7)
NEUTROPHILS NFR BLD AUTO: 47 %
NITRITE UR QL STRIP: NEGATIVE
PH UR STRIP: 5.5 [PH] (ref 5–7.5)
PLATELET # BLD AUTO: 128 X10E3/UL (ref 150–450)
POTASSIUM SERPL-SCNC: 4.6 MMOL/L (ref 3.5–5.2)
PROT SERPL-MCNC: 7.9 G/DL (ref 6–8.5)
PROT UR QL STRIP: ABNORMAL
PSA SERPL-MCNC: 1.4 NG/ML (ref 0–4)
RBC # BLD AUTO: 5.12 X10E6/UL (ref 4.14–5.8)
RBC #/AREA URNS HPF: ABNORMAL /HPF (ref 0–2)
SODIUM SERPL-SCNC: 141 MMOL/L (ref 134–144)
SP GR UR: 1.02 (ref 1–1.03)
TRIGL SERPL-MCNC: 58 MG/DL (ref 0–149)
UROBILINOGEN UR STRIP-MCNC: 0.2 MG/DL (ref 0.2–1)
VLDLC SERPL CALC-MCNC: 12 MG/DL (ref 5–40)
WBC # BLD AUTO: 4.7 X10E3/UL (ref 3.4–10.8)
WBC #/AREA URNS HPF: ABNORMAL /HPF (ref 0–5)

## 2019-09-04 ENCOUNTER — TELEPHONE (OUTPATIENT)
Dept: BEHAVIORAL/MENTAL HEALTH CLINIC | Age: 69
End: 2019-09-04

## 2019-09-04 NOTE — TELEPHONE ENCOUNTER
Wife calls   Pharmacy broke up the 90 day refills and only gave 30 days at a time as it was less expensive   Pt needs refill of Aricept and Risperdal     Thanks   Pt has fu appt 9/17/19

## 2019-09-05 DIAGNOSIS — F02.818 DEMENTIA DUE TO ANOTHER GENERAL MEDICAL CONDITION, WITH BEHAVIORAL DISTURBANCE: ICD-10-CM

## 2019-09-05 RX ORDER — DONEPEZIL HYDROCHLORIDE 5 MG/1
5 TABLET, FILM COATED ORAL
Qty: 30 TAB | Refills: 1 | Status: SHIPPED | OUTPATIENT
Start: 2019-09-05 | End: 2019-09-17 | Stop reason: SDUPTHER

## 2019-09-05 RX ORDER — RISPERIDONE 0.5 MG/1
0.5 TABLET, FILM COATED ORAL
Qty: 30 TAB | Refills: 1 | Status: SHIPPED | OUTPATIENT
Start: 2019-09-05 | End: 2019-09-17 | Stop reason: SDUPTHER

## 2019-09-17 ENCOUNTER — OFFICE VISIT (OUTPATIENT)
Dept: BEHAVIORAL/MENTAL HEALTH CLINIC | Age: 69
End: 2019-09-17

## 2019-09-17 VITALS
DIASTOLIC BLOOD PRESSURE: 77 MMHG | SYSTOLIC BLOOD PRESSURE: 137 MMHG | HEIGHT: 67 IN | HEART RATE: 70 BPM | BODY MASS INDEX: 14.97 KG/M2

## 2019-09-17 DIAGNOSIS — R44.3 HALLUCINATION: ICD-10-CM

## 2019-09-17 DIAGNOSIS — F02.818 DEMENTIA DUE TO GENERAL MEDICAL CONDITION WITH BEHAVIORAL DISTURBANCE: Primary | ICD-10-CM

## 2019-09-17 DIAGNOSIS — F02.818 DEMENTIA DUE TO ANOTHER GENERAL MEDICAL CONDITION, WITH BEHAVIORAL DISTURBANCE: ICD-10-CM

## 2019-09-17 RX ORDER — RISPERIDONE 0.5 MG/1
0.5 TABLET, FILM COATED ORAL
Qty: 30 TAB | Refills: 6 | Status: SHIPPED | OUTPATIENT
Start: 2019-09-17 | End: 2020-03-17 | Stop reason: SDUPTHER

## 2019-09-17 RX ORDER — DONEPEZIL HYDROCHLORIDE 5 MG/1
5 TABLET, FILM COATED ORAL
Qty: 30 TAB | Refills: 6 | Status: SHIPPED | OUTPATIENT
Start: 2019-09-17 | End: 2020-03-17 | Stop reason: SDUPTHER
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
## 2020-01-02 ENCOUNTER — OFFICE VISIT (OUTPATIENT)
Dept: INTERNAL MEDICINE CLINIC | Age: 70
End: 2020-01-02

## 2020-01-02 VITALS
DIASTOLIC BLOOD PRESSURE: 83 MMHG | BODY MASS INDEX: 14.97 KG/M2 | HEART RATE: 80 BPM | OXYGEN SATURATION: 95 % | SYSTOLIC BLOOD PRESSURE: 155 MMHG | TEMPERATURE: 97.5 F | HEIGHT: 67 IN | RESPIRATION RATE: 18 BRPM

## 2020-01-02 DIAGNOSIS — S88.911D AMPUTATION OF BOTH LOWER EXTREMITIES, SUBSEQUENT ENCOUNTER (HCC): ICD-10-CM

## 2020-01-02 DIAGNOSIS — S88.912D AMPUTATION OF BOTH LOWER EXTREMITIES, SUBSEQUENT ENCOUNTER (HCC): ICD-10-CM

## 2020-01-02 DIAGNOSIS — I10 ESSENTIAL HYPERTENSION: ICD-10-CM

## 2020-01-02 DIAGNOSIS — F02.818 DEMENTIA DUE TO ANOTHER GENERAL MEDICAL CONDITION, WITH BEHAVIORAL DISTURBANCE: ICD-10-CM

## 2020-01-02 DIAGNOSIS — N18.30 STAGE 3 CHRONIC KIDNEY DISEASE (HCC): ICD-10-CM

## 2020-01-02 DIAGNOSIS — N04.9 NEPHROTIC SYNDROME: ICD-10-CM

## 2020-01-02 DIAGNOSIS — R35.1 NOCTURIA: ICD-10-CM

## 2020-01-02 DIAGNOSIS — Z00.00 MEDICARE ANNUAL WELLNESS VISIT, SUBSEQUENT: Primary | ICD-10-CM

## 2020-01-02 DIAGNOSIS — R44.3 HALLUCINATION: ICD-10-CM

## 2020-01-02 DIAGNOSIS — Z13.31 SCREENING FOR DEPRESSION: ICD-10-CM

## 2020-01-02 DIAGNOSIS — F22 DELUSIONAL DISORDERS (HCC): ICD-10-CM

## 2020-01-02 DIAGNOSIS — Z13.39 SCREENING FOR ALCOHOLISM: ICD-10-CM

## 2020-01-02 DIAGNOSIS — R79.9 ABNORMAL FINDING OF BLOOD CHEMISTRY, UNSPECIFIED: ICD-10-CM

## 2020-01-02 NOTE — PROGRESS NOTES
1. Have you been to the ER, urgent care clinic since your last visit? Hospitalized since your last visit? No    2. Have you seen or consulted any other health care providers outside of the [address] [place] [address] since your last visit? Include any pap smears or colon screening. No     Wants to discuss wheel chair  This is the Subsequent Medicare Annual Wellness Exam, performed 12 months or more after the Initial AWV or the last Subsequent AWV    I have reviewed the patient's medical history in detail and updated the computerized patient record.      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 04/19/2019    md curly    Gangrene ([facility] 2011    bilateral amputation d/t gangrene    Hallucination     Hernia, inguinal, left 04/01/2019    Hypertension     Pancreatic mass     Prediabetes     Renal failure     S/P colonoscopy 1-4-08 2/26/19    ryan repeat 5 yrs    UTI (urinary tract infection)     Wound, open       Past Surgical History:   Procedure Laterality Date    ABDOMEN SURGERY PROC UNLISTED      colonoscopy    CARDIAC SURG PROCEDURE UNLIST      COLONOSCOPY N/A 2018    COLONOSCOPY performed by Niki Jordan MD at [facility]      bilateral amputation    HX UROLOGICAL      left orchiectomy    NEUROLOGICAL PROCEDURE UNLISTED  2004    aneursym     Current Outpatient Medications   Medication Sig Dispense Refill    risperiDONE (RISPERDAL) 0.5 mg tablet Take 1 Tab by mouth nightly. 30 Tab 6    donepezil (ARICEPT) 5 mg tablet Take 1 Tab by mouth nightly. 30 Tab 6    tamsulosin (FLOMAX) 0.4 mg capsule TAKE 1 CAPSULE BY MOUTH EVERY DAY 90 Cap 3     No Known Allergies    Family History   Problem Relation Age of Onset    No Known Problems Father      Social History     Tobacco Use    Smoking status: Former Smoker     Last attempt to quit: 2000     Years since quittin.4    Smokeless tobacco: Never Used   Substance Use Topics    [facility] use: Yes     [facility]       Depression Risk Factor Screening:     3 most recent PHQ Screens 2020   PHQ Not Done -   Little interest or pleasure in doing things Not at all   Feeling down, depressed, irritable, or hopeless Not at all   Total Score PHQ 2 0   Trouble falling or staying asleep, or sleeping too much -   Feeling tired or having little energy -   Poor appetite, weight loss, or overeating -   Feeling bad about yourself - or that you are a failure or have let yourself or your family down -   Trouble concentrating on things such as school, work, reading, or watching TV -   Moving or speaking so slowly that other people could have noticed; or the opposite being so fidgety that others notice -   Thoughts of being better off dead, or hurting yourself in some way -   PHQ 9 Score -   How difficult have these problems made it for you to do your work, take care of your home and get along with others -       Alcohol Risk Factor Screening (MALE > 65):    Do you average more 1 drink per night or more than 7 drinks a week: No    In the past three months have you have had more than 4 drinks containing alcohol on one occasion: No      Functional Ability and Level of Safety:   Hearing: Hearing is good. Activities of Daily Living: The home contains: handrails and grab bars  Patient does total self care    Ambulation: with mild difficulty    Fall Risk:  Fall Risk Assessment, last 12 mths 1/2/2020   Able to walk? No   Fall in past 12 months?  -       Abuse Screen:  Patient is not abused    Cognitive Screening   Has your family/caregiver stated any concerns about your memory: no  Cognitive Screening: Normal - MMSE (Mini Mental Status Exam)    Patient Care Team   Patient Care Team:  Reinaldo Sharpe MD as PCP - General (Internal Medicine)  Reinaldo Sharpe MD as PCP - REHABILITATION [facility] Empaneled Provider    Assessment/Plan   Education and counseling provided:  Are appropriate based on today's review and evaluation        Health Maintenance Due   Topic Date Due    GLAUCOMA SCREENING Q2Y  12/12/2018    MEDICARE YEARLY EXAM  12/04/2019

## 2020-01-02 NOTE — PROGRESS NOTES
SPORTS MEDICINE AND PRIMARY CARE  Emerson Pandey MD, [address] [address]  Phone:  [phone]  Fax: [phone]      Chief Complaint   Patient presents with    Annual Wellness Visit         SUBECTIVE:    Odalis Oneil is a 77 y.o. male Patient returns today after a visit with Rene Jenkins MD, psychiatry, for dementia with behavior disturbance, hallucinations, and was placed on Risperdal 0.5 mg q.h.s. and Aricept 5 mg nightly. He was found to be responding to treatment since he had been under control and has not demonstrated any hallucinations or aggressive behaviors according to the notes. Patient has a known history of primary hypertension, bilateral AKA, CKD, nephrotic syndrome, primary hypertension, and is seen for evaluation. Patient returns today with his wife, who notes he is taking medications as listed. He has shakes periodically that he thinks he is dancing or something. The only issue at home at night is that they have a bedside commode or urinal that he uses as he cannot get up to go to the bathroom independently. Generally she allows him to do more and more for himself, which has been helpful to both of them. Other new complaints denied, patient is seen for evaluation. Current Outpatient Medications   Medication Sig Dispense Refill    risperiDONE (RISPERDAL) 0.5 mg tablet Take 1 Tab by mouth nightly. 30 Tab 6    donepezil (ARICEPT) 5 mg tablet Take 1 Tab by mouth nightly.  30 Tab 6    tamsulosin (FLOMAX) 0.4 mg capsule TAKE 1 CAPSULE BY MOUTH EVERY DAY 90 Cap 3     Past Medical History:   Diagnosis Date    Amputation of both lower extremities (HCC)     tenderness and swollen left breeast    Aneurysm ([facility]     Conjunctivitis of right eye 08/30/2016    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 ([facility] 04/19/2019    md curly    Gangrene ([facility] 2011    bilateral amputation d/t gangrene    Hallucination     Hernia, inguinal, left 2019    Hypertension     Pancreatic mass     Prediabetes     Renal failure     Dr Ashley Murrell S/P colonoscopy 08    ryan repeat 5 yrs    UTI (urinary tract infection)     Wound, open      Past Surgical History:   Procedure Laterality Date    ABDOMEN SURGERY PROC UNLISTED      colonoscopy    CARDIAC SURG PROCEDURE UNLIST      COLONOSCOPY N/A 2018    [address] performed by Bianca Shelton MD [address]      bilateral amputation    HX UROLOGICAL      left orchiectomy    NEUROLOGICAL PROCEDURE UNLISTED  2004    aneursym     No Known Allergies    REVIEW OF SYSTEMS:   His neighbors or family check on him when she is not at home, however she states he can fix his own food now. Social History     Socioeconomic History    Marital status:      Spouse name: Not on file    Number of children: Not on file    Years of education: Not on file    Highest education level: Not on file   Tobacco Use    Smoking status: Former Smoker     Last attempt to quit: 2000     Years since quittin.4    Smokeless tobacco: Never Used   Substance and Sexual Activity    Alcohol use: Yes     Comment: ocassional    Drug use: No    Sexual activity: Not Currently     Partners: Female   r  Family History   Problem Relation Age of Onset    No Known Problems Father        OBJECTIVE:  Visit Vitals  /83   Pulse 80   Temp 97.5 °F (36.4 °C) (Oral)   Resp 18   Ht 5' 7\" (1.702 m)   SpO2 95%   BMI 14.97 kg/m²     ENT: perrla,  eom intact  NECK: supple. Thyroid normal  CHEST: clear to ascultation and percussion   HEART: regular rate and rhythm  ABD: soft, bowel sounds active  EXTREMITIES: no edema, pulse 1+     No visits with results within 3 Month(s) from this visit.    Latest known visit with results is:   Office Visit on 2019   Component Date Value Ref Range Status    Specific Gravity 2019 1.018  1.005 - 1.030 Final    pH (UA) 2019 5.5  5.0 - 7.5 Final    Color 08/01/2019 Yellow  Yellow Final    Appearance 08/01/2019 Clear  Clear Final    Leukocyte Esterase 08/01/2019 1+* Negative Final    Protein 08/01/2019 Trace  Negative/Trace Final    Glucose 08/01/2019 Negative  Negative Final    Ketone 08/01/2019 Negative  Negative Final    Blood 08/01/2019 Negative  Negative Final    Bilirubin 08/01/2019 Negative  Negative Final    Urobilinogen 08/01/2019 0.2  0.2 - 1.0 mg/dL Final    Nitrites 08/01/2019 Negative  Negative Final    Microscopic Examination 08/01/2019 See additional order   Final    Microscopic was indicated and was performed.  WBC 08/01/2019 4.7  3.4 - 10.8 x10E3/uL Final    RBC 08/01/2019 5.12  4.14 - 5.80 x10E6/uL Final    HGB 08/01/2019 13.0  13.0 - 17.7 g/dL Final    HCT 08/01/2019 39.8  37.5 - 51.0 % Final    MCV 08/01/2019 78* 79 - 97 fL Final    MCH 08/01/2019 25.4* 26.6 - 33.0 pg Final    MCHC 08/01/2019 32.7  31.5 - 35.7 g/dL Final    RDW 08/01/2019 16.8* 12.3 - 15.4 % Final    PLATELET 89/76/8806 671* 150 - 450 x10E3/uL Final    NEUTROPHILS 08/01/2019 47  Not Estab. % Final    Lymphocytes 08/01/2019 37  Not Estab. % Final    MONOCYTES 08/01/2019 8  Not Estab. % Final    EOSINOPHILS 08/01/2019 7  Not Estab. % Final    BASOPHILS 08/01/2019 1  Not Estab. % Final    ABS. NEUTROPHILS 08/01/2019 2.2  1.4 - 7.0 x10E3/uL Final    Abs Lymphocytes 08/01/2019 1.8  0.7 - 3.1 x10E3/uL Final    ABS. MONOCYTES 08/01/2019 0.4  0.1 - 0.9 x10E3/uL Final    ABS. EOSINOPHILS 08/01/2019 0.3  0.0 - 0.4 x10E3/uL Final    ABS. BASOPHILS 08/01/2019 0.1  0.0 - 0.2 x10E3/uL Final    IMMATURE GRANULOCYTES 08/01/2019 0  Not Estab. % Final    ABS. IMM.  GRANS. 08/01/2019 0.0  0.0 - 0.1 x10E3/uL Final    Glucose 08/01/2019 81  65 - 99 mg/dL Final    BUN 08/01/2019 18  8 - 27 mg/dL Final    Creatinine 08/01/2019 1.16  0.76 - 1.27 mg/dL Final    GFR est non-AA 08/01/2019 64  >59 mL/min/1.73 Final    GFR est AA 08/01/2019 74  >59 mL/min/1.73 Final    BUN/Creatinine ratio 08/01/2019 16  10 - 24 Final    Sodium 08/01/2019 141  134 - 144 mmol/L Final    Potassium 08/01/2019 4.6  3.5 - 5.2 mmol/L Final    Chloride 08/01/2019 101  96 - 106 mmol/L Final    CO2 08/01/2019 23  20 - 29 mmol/L Final    Calcium 08/01/2019 9.8  8.6 - 10.2 mg/dL Final    Protein, total 08/01/2019 7.9  6.0 - 8.5 g/dL Final    Albumin 08/01/2019 4.4  3.6 - 4.8 g/dL Final    GLOBULIN, TOTAL 08/01/2019 3.5  1.5 - 4.5 g/dL Final    A-G Ratio 08/01/2019 1.3  1.2 - 2.2 Final    Bilirubin, total 08/01/2019 0.4  0.0 - 1.2 mg/dL Final    Alk. phosphatase 08/01/2019 70  39 - 117 IU/L Final    AST (SGOT) 08/01/2019 16  0 - 40 IU/L Final    ALT (SGPT) 08/01/2019 11  0 - 44 IU/L Final    Cholesterol, total 08/01/2019 215* 100 - 199 mg/dL Final    Triglyceride 08/01/2019 58  0 - 149 mg/dL Final    HDL Cholesterol 08/01/2019 82  >39 mg/dL Final    VLDL, calculated 08/01/2019 12  5 - 40 mg/dL Final    LDL, calculated 08/01/2019 121* 0 - 99 mg/dL Final    Prostate Specific Ag 08/01/2019 1.4  0.0 - 4.0 ng/mL Final    Comment: Roche ECLIA methodology. According to the American Urological Association, Serum PSA should  decrease and remain at undetectable levels after radical  prostatectomy. The AUA defines biochemical recurrence as an initial  PSA value 0.2 ng/mL or greater followed by a subsequent confirmatory  PSA value 0.2 ng/mL or greater. Values obtained with different assay methods or kits cannot be used  interchangeably. Results cannot be interpreted as absolute evidence  of the presence or absence of malignant disease.       Hemoglobin A1c 08/01/2019 5.7* 4.8 - 5.6 % Final    Comment:          Prediabetes: 5.7 - 6.4           Diabetes: >6.4           Glycemic control for adults with diabetes: <7.0      Estimated average glucose 08/01/2019 117  mg/dL Final    WBC 08/01/2019 6-10* 0 - 5 /hpf Final    RBC 08/01/2019 None seen  0 - 2 /hpf Final    Epithelial cells 08/01/2019 0-10  0 - 10 /hpf Final    Casts 08/01/2019 None seen  None seen /lpf Final    Mucus 08/01/2019 Present  Not Estab. Final    Bacteria 08/01/2019 Few  None seen/Few Final          ASSESSMENT:  1. Medicare annual wellness visit, subsequent    2. Screening for alcoholism    3. Screening for depression    4. 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 Hallucination    6. Essential hypertension    7. Amputation of both lower extremities, subsequent encounter ([facility]    8. Stage 3 chronic kidney disease ([facility]    9. Nephrotic syndrome    10. Delusional disorders ([facility]     11. Abnormal finding of blood chemistry, unspecified     12. Nocturia       The dementia is controlled with the Aricept. He is not having the behavioral disturbances he had previously. The hallucinations improved. BP is a little higher than we expect, repeat BP is noted. He has a history of stage 3 CKD, for which we continue to follow his renal function on a regular basis, as well as nephrology. There is a history of nephrotic syndrome. He will be back to see us in about four months, sooner if he has any problems    Patient complains of malfunction of the right side of his wheelchair and would like it repaired. He is bed to chair confined and therefore it is a requirement. I have discussed the diagnosis with the patient and the intended plan as seen in the  orders above. The patient understands and agees with the plan. The patient has   received an after visit summary and questions were answered concerning  future plans  Patient labs and/or xrays were reviewed  Past records were reviewed.     PLAN:  .  Orders Placed This Encounter    Depression Screen Annual    AMB SUPPLY ORDER    METABOLIC PANEL, COMPREHENSIVE    LIPID PANEL    TSH 3RD GENERATION    HEMOGLOBIN A1C WITH EAG    URINALYSIS W/ RFLX MICROSCOPIC    PROSTATE SPECIFIC AG       Follow-up and Dispositions    · Return in about 4 months (around 5/2/2020). ATTENTION:   This medical record was transcribed using an electronic medical records system. Although proofread, it may and can contain electronic and spelling errors. Other human spelling and other errors may be present. Corrections may be executed at a later time. Please feel free to contact us for any clarifications as needed.

## 2020-01-02 NOTE — PATIENT INSTRUCTIONS
Medicare Wellness Visit, Male    The best way to live healthy is to have a lifestyle where you eat a well-balanced diet, exercise regularly, limit alcohol use, and quit all forms of tobacco/nicotine, if applicable. Regular preventive services are another way to keep healthy. Preventive services (vaccines, screening tests, monitoring & exams) can help personalize your care plan, which helps you manage your own care. Screening tests can find health problems at the earliest stages, when they are easiest to treat. [facility] follows the current, evidence-based guidelines published by the [facility] ([facility]) when recommending preventive services for our patients. Because we follow these guidelines, sometimes recommendations change over time as research supports it. (For example, a prostate screening blood test is no longer routinely recommended for men with no symptoms). Of course, you and your doctor may decide to screen more often for some diseases, based on your risk and co-morbidities (chronic disease you are already diagnosed with). Preventive services for you include:  - Medicare offers their members a free annual wellness visit, which is time for you and your primary care provider to discuss and plan for your preventive service needs. Take advantage of this benefit every year!  -All adults over age 72 should receive the recommended pneumonia vaccines. Current USPSTF guidelines recommend a series of two vaccines for the best pneumonia protection.   -All adults should have a flu vaccine yearly and tetanus vaccine every 10 years.  -All adults age 48 and older should receive the shingles vaccines (series of two vaccines).        -All adults age 38-68 who are overweight should have a diabetes screening test once every three years.   -Other screening tests & preventive services for persons with diabetes include: an eye exam to screen for diabetic retinopathy, a kidney function test, a foot exam, and stricter control over your cholesterol.   -Cardiovascular screening for adults with routine risk involves an electrocardiogram (ECG) at intervals determined by the provider.   -Colorectal cancer screening should be done for adults age 54-65 with no increased risk factors for colorectal cancer. There are a number of acceptable methods of screening for this type of cancer. Each test has its own benefits and drawbacks. Discuss with your provider what is most appropriate for you during your annual wellness visit. The different tests include: colonoscopy (considered the best screening method), a fecal occult blood test, a fecal DNA test, and sigmoidoscopy.  -All adults born between [facility] should be screened once for Hepatitis C.  -An Abdominal Aortic Aneurysm (AAA) Screening is recommended for men age 73-68 who has ever smoked in their lifetime.      Here is a list of your current Health Maintenance items (your personalized list of preventive services) with a due date:  Health Maintenance Due   Topic Date Due    Glaucoma Screening   12/12/2018    Annual Well Visit  12/04/2019

## 2020-01-03 LAB
ALBUMIN SERPL-MCNC: 4 G/DL (ref 3.6–4.8)
ALBUMIN/GLOB SERPL: 1 {RATIO} (ref 1.2–2.2)
ALP SERPL-CCNC: 85 IU/L (ref 39–117)
ALT SERPL-CCNC: 24 IU/L (ref 0–44)
APPEARANCE UR: CLEAR
AST SERPL-CCNC: 25 IU/L (ref 0–40)
BACTERIA #/AREA URNS HPF: NORMAL /[HPF]
BILIRUB SERPL-MCNC: 0.4 MG/DL (ref 0–1.2)
BILIRUB UR QL STRIP: NEGATIVE
BUN SERPL-MCNC: 19 MG/DL (ref 8–27)
BUN/CREAT SERPL: 14 (ref 10–24)
CALCIUM SERPL-MCNC: 9.9 MG/DL (ref 8.6–10.2)
CASTS URNS QL MICRO: NORMAL /LPF
CHLORIDE SERPL-SCNC: 103 MMOL/L (ref 96–106)
CHOLEST SERPL-MCNC: 209 MG/DL (ref 100–199)
CO2 SERPL-SCNC: 23 MMOL/L (ref 20–29)
COLOR UR: YELLOW
CREAT SERPL-MCNC: 1.39 MG/DL (ref 0.76–1.27)
EPI CELLS #/AREA URNS HPF: NORMAL /HPF (ref 0–10)
EST. AVERAGE GLUCOSE BLD GHB EST-MCNC: 114 MG/DL
GLOBULIN SER CALC-MCNC: 3.9 G/DL (ref 1.5–4.5)
GLUCOSE SERPL-MCNC: 85 MG/DL (ref 65–99)
GLUCOSE UR QL: NEGATIVE
HBA1C MFR BLD: 5.6 % (ref 4.8–5.6)
HDLC SERPL-MCNC: 86 MG/DL
HGB UR QL STRIP: NEGATIVE
KETONES UR QL STRIP: NEGATIVE
LDLC SERPL CALC-MCNC: 113 MG/DL (ref 0–99)
LEUKOCYTE ESTERASE UR QL STRIP: NEGATIVE
MICRO URNS: ABNORMAL
MUCOUS THREADS URNS QL MICRO: PRESENT
NITRITE UR QL STRIP: NEGATIVE
PH UR STRIP: 6.5 [PH] (ref 5–7.5)
POTASSIUM SERPL-SCNC: 4.8 MMOL/L (ref 3.5–5.2)
PROT SERPL-MCNC: 7.9 G/DL (ref 6–8.5)
PROT UR QL STRIP: ABNORMAL
PSA SERPL-MCNC: 0.8 NG/ML (ref 0–4)
RBC #/AREA URNS HPF: NORMAL /HPF (ref 0–2)
SODIUM SERPL-SCNC: 143 MMOL/L (ref 134–144)
SP GR UR: 1.02 (ref 1–1.03)
TRIGL SERPL-MCNC: 50 MG/DL (ref 0–149)
TSH SERPL DL<=0.005 MIU/L-ACNC: 1 UIU/ML (ref 0.45–4.5)
UROBILINOGEN UR STRIP-MCNC: 0.2 MG/DL (ref 0.2–1)
VLDLC SERPL CALC-MCNC: 10 MG/DL (ref 5–40)
WBC #/AREA URNS HPF: NORMAL /HPF (ref 0–5)

## 2020-03-17 ENCOUNTER — OFFICE VISIT (OUTPATIENT)
Dept: BEHAVIORAL/MENTAL HEALTH CLINIC | Age: 70
End: 2020-03-17

## 2020-03-17 DIAGNOSIS — R44.3 HALLUCINATION: ICD-10-CM

## 2020-03-17 DIAGNOSIS — F02.818 DEMENTIA DUE TO ANOTHER GENERAL MEDICAL CONDITION, WITH BEHAVIORAL DISTURBANCE: Primary | ICD-10-CM

## 2020-03-17 RX ORDER — RISPERIDONE 0.5 MG/1
0.5 TABLET, FILM COATED ORAL
Qty: 30 TAB | Refills: 9 | Status: SHIPPED | OUTPATIENT
Start: 2020-03-17 | End: 2020-06-23 | Stop reason: SDUPTHER

## 2020-03-17 RX ORDER — DONEPEZIL HYDROCHLORIDE 5 MG/1
5 TABLET, FILM COATED ORAL
Qty: 30 TAB | Refills: 9 | Status: SHIPPED | OUTPATIENT
Start: 2020-03-17 | End: 2020-06-23 | Stop reason: SDUPTHER
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
## 2020-04-10 PROBLEM — F02.818 DEMENTIA DUE TO GENERAL MEDICAL CONDITION WITH BEHAVIORAL DISTURBANCE: Status: RESOLVED | Noted: 2019-04-19 | Resolved: 2020-04-10

## 2020-04-10 PROBLEM — Z86.718: Status: ACTIVE | Noted: 2020-04-10

## 2020-06-23 ENCOUNTER — OFFICE VISIT (OUTPATIENT)
Dept: INTERNAL MEDICINE CLINIC | Age: 70
End: 2020-06-23

## 2020-06-23 VITALS
SYSTOLIC BLOOD PRESSURE: 127 MMHG | RESPIRATION RATE: 14 BRPM | OXYGEN SATURATION: 93 % | DIASTOLIC BLOOD PRESSURE: 75 MMHG | HEART RATE: 85 BPM | HEIGHT: 67 IN | BODY MASS INDEX: 14.97 KG/M2 | TEMPERATURE: 98.1 F

## 2020-06-23 DIAGNOSIS — I10 ESSENTIAL HYPERTENSION, BENIGN: ICD-10-CM

## 2020-06-23 DIAGNOSIS — N18.30 STAGE 3 CHRONIC KIDNEY DISEASE (HCC): ICD-10-CM

## 2020-06-23 DIAGNOSIS — S88.912D AMPUTATION OF BOTH LOWER EXTREMITIES, SUBSEQUENT ENCOUNTER (HCC): ICD-10-CM

## 2020-06-23 DIAGNOSIS — Z86.79 S/P CEREBRAL ANEURYSM REPAIR: ICD-10-CM

## 2020-06-23 DIAGNOSIS — Z98.890 S/P CEREBRAL ANEURYSM REPAIR: ICD-10-CM

## 2020-06-23 DIAGNOSIS — S88.911D AMPUTATION OF BOTH LOWER EXTREMITIES, SUBSEQUENT ENCOUNTER (HCC): ICD-10-CM

## 2020-06-23 DIAGNOSIS — I10 ESSENTIAL HYPERTENSION: ICD-10-CM

## 2020-06-23 DIAGNOSIS — F02.818 DEMENTIA DUE TO ANOTHER GENERAL MEDICAL CONDITION, WITH BEHAVIORAL DISTURBANCE: ICD-10-CM

## 2020-06-23 DIAGNOSIS — K40.90 HERNIA, INGUINAL, LEFT: Primary | ICD-10-CM

## 2020-06-23 RX ORDER — TAMSULOSIN HYDROCHLORIDE 0.4 MG/1
CAPSULE ORAL
Qty: 90 CAP | Refills: 3 | Status: SHIPPED | OUTPATIENT
Start: 2020-06-23 | End: 2021-04-28 | Stop reason: SDUPTHER

## 2020-06-23 RX ORDER — DONEPEZIL HYDROCHLORIDE 5 MG/1
5 TABLET, FILM COATED ORAL
Qty: 30 TAB | Refills: 11 | Status: SHIPPED | OUTPATIENT
Start: 2020-06-23 | End: 2021-06-30 | Stop reason: SDUPTHER

## 2020-06-23 RX ORDER — RISPERIDONE 0.5 MG/1
0.5 TABLET, FILM COATED ORAL
Qty: 30 TAB | Refills: 11 | Status: SHIPPED | OUTPATIENT
Start: 2020-06-23 | End: 2021-06-30 | Stop reason: SDUPTHER

## 2020-06-23 NOTE — ASSESSMENT & PLAN NOTE
Stable, based on history, physical exam and review of pertinent labs, studies and medications; meds reconciled; continue current treatment plan.   Lab Results   Component Value Date/Time    WBC 4.7 08/01/2019 12:47 PM    HGB 13.0 08/01/2019 12:47 PM    HCT 39.8 08/01/2019 12:47 PM    PLATELET 673 75/89/5510 12:47 PM    Creatinine 1.39 01/02/2020 01:23 PM    BUN 19 01/02/2020 01:23 PM    Potassium 4.8 01/02/2020 01:23 PM

## 2020-06-23 NOTE — PROGRESS NOTES
1. Have you been to the ER, urgent care clinic since your last visit? Hospitalized since your last visit? No    2. Have you seen or consulted any other health care providers outside of the [address] [place] [address] since your last visit? Include any pap smears or colon screening.  No No issues just a checkup

## 2020-06-23 NOTE — PROGRESS NOTES
SPORTS MEDICINE AND PRIMARY CARE  Asa Allen MD, [address] [address]  Phone:  [phone]  Fax: [phone]       Chief Complaint   Patient presents with    Hypertension   . SUBJECTIVE:    Flakita Parkinson is a 72 y.o. male Patient returns today with his wife with a note that indicates Dr. Jada Montiel is retiring and would like to transfer Aricept or Risperdal to our care. He is having no problems. He still talks in his sleep, as always, and is pretty much taking care of himself. His appetite is good. He is a picky eater because of his wife. Patient is seen for evaluation. Current Outpatient Medications   Medication Sig Dispense Refill    donepeziL (ARICEPT) 5 mg tablet Take 1 Tab by mouth nightly. 30 Tab 11    risperiDONE (RisperDAL) 0.5 mg tablet Take 1 Tab by mouth nightly.  30 Tab 11    tamsulosin (FLOMAX) 0.4 mg capsule TAKE 1 CAPSULE BY MOUTH EVERY DAY 90 Cap 3     Past Medical History:   Diagnosis Date    Amputation of both lower extremities (HCC)     tenderness and swollen left breeast    Aneurysm ([address]     Conjunctivitis of right eye 08/30/2016    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 ([address] 04/19/2019    md curly    Gangrene ([facility] 2011    bilateral amputation d/t gangrene    Hallucination     Hernia, inguinal, left 04/01/2019    Hypertension     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    Pancreatic mass     Prediabetes     Renal failure     Dr Jackson Head S/P colonoscopy 1-4-08 2/26/19    ryan repeat 5 yrs    UTI (urinary tract infection)     Wound, open      Past Surgical History:   Procedure Laterality Date    ABDOMEN SURGERY PROC UNLISTED      colonoscopy    CARDIAC SURG PROCEDURE UNLIST      COLONOSCOPY N/A 8/7/2018    COLONOSCOPY performed by Mg Schroeder MD at [address] [place] [address]      bilateral amputation    HX UROLOGICAL      left orchiectomy    NEUROLOGICAL PROCEDURE UNLISTED  2004    aneursym     No Known Allergies      REVIEW OF SYSTEMS:  General: negative for - chills or fever  ENT: negative for - headaches, nasal congestion or tinnitus  Respiratory: negative for - cough, hemoptysis, shortness of breath or wheezing  Cardiovascular : negative for - chest pain, edema, palpitations or shortness of breath  Gastrointestinal: negative for - abdominal pain, blood in stools, heartburn or nausea/vomiting  Genito-Urinary: no dysuria, trouble voiding, or hematuria  Musculoskeletal: negative for - gait disturbance, joint pain, joint stiffness or joint swelling  Neurological: no TIA or stroke symptoms  Hematologic: no bruises, no bleeding, no swollen glands  Integument: no lumps, mole changes, nail changes or rash  Endocrine: no malaise/lethargy or unexpected weight changes      Social History     Socioeconomic History    Marital status:      Spouse name: Not on file    Number of children: Not on file    Years of education: Not on file    Highest education level: Not on file   Tobacco Use    Smoking status: Former Smoker     Last attempt to quit: 2000     Years since quittin.8    Smokeless tobacco: Never Used   Substance and Sexual Activity    Alcohol use: Yes     Comment: ocassional    Drug use: No    Sexual activity: Not Currently     Partners: Female     Family History   Problem Relation Age of Onset    No Known Problems Father        OBJECTIVE:    Visit Vitals  /75   Pulse 85   Temp 98.1 °F (36.7 °C) (Oral)   Resp 14   Ht 5' 7\" (1.702 m)   SpO2 93%   BMI 14.97 kg/m²     CONSTITUTIONAL: well , well nourished, appears age appropriate  EYES: perrla, eom intact  ENMT:moist mucous membranes, pharynx clear  NECK: supple.  Thyroid normal  RESPIRATORY: Chest: clear bilaterally   CARDIOVASCULAR: Heart: regular rate and rhythm  GASTROINTESTINAL: Abdomen: soft, bowel sounds active  HEMATOLOGIC: no pathological lymph nodes palpated  MUSCULOSKELETAL: Extremities: no edema, pulse 1+   INTEGUMENT: No unusual rashes or suspicious skin lesions noted. Nails appear normal.  NEUROLOGIC: non-focal exam   MENTAL STATUS: alert and oriented, appropriate affect           ASSESSMENT:  1. Hernia, inguinal, left    2. 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 Amputation of both lower extremities, subsequent encounter ([facility]    4. Essential hypertension    5. S/P cerebral aneurysm repair    6. Essential hypertension, benign    7. Stage 3 chronic kidney disease ([facility]      Hernia is asymptomatic. Dementia has been relatively stable. He is not having any hallucinations now. Amputation is well healed. He has a prosthesis on his left leg. BP control is at goal.    No symptoms related to cerebral aneurysm, which was repaired. We will check his renal function, as well as his proteinuria with this urine sample. He will be back to see us in three to four months, sooner if needed. I have discussed the diagnosis with the patient and the intended plan as seen in the  orders above. The patient understands and agees with the plan. The patient has   received an after visit summary and questions were answered concerning  future plans  Patient labs and/or xrays were reviewed  Past records were reviewed. PLAN:  .  Orders Placed This Encounter    CBC WITH AUTOMATED DIFF    RENAL FUNCTION PANEL    URINALYSIS W/ REFLEX CULTURE    donepeziL (ARICEPT) 5 mg tablet    risperiDONE (RisperDAL) 0.5 mg tablet       Follow-up and Dispositions    · Return in about 6 months (around 12/23/2020). ATTENTION:   This medical record was transcribed using an electronic medical records system. Although proofread, it may and can contain electronic and spelling errors. Other human spelling and other errors may be present. Corrections may be executed at a later time. Please feel free to contact us for any clarifications as needed.

## 2020-06-24 LAB
ALBUMIN SERPL-MCNC: 4.4 G/DL (ref 3.8–4.8)
BASOPHILS # BLD AUTO: 0.1 X10E3/UL (ref 0–0.2)
BASOPHILS NFR BLD AUTO: 2 %
BUN SERPL-MCNC: 20 MG/DL (ref 8–27)
BUN/CREAT SERPL: 16 (ref 10–24)
CALCIUM SERPL-MCNC: 10.1 MG/DL (ref 8.6–10.2)
CHLORIDE SERPL-SCNC: 105 MMOL/L (ref 96–106)
CO2 SERPL-SCNC: 18 MMOL/L (ref 20–29)
CREAT SERPL-MCNC: 1.29 MG/DL (ref 0.76–1.27)
EOSINOPHIL # BLD AUTO: 0.5 X10E3/UL (ref 0–0.4)
EOSINOPHIL NFR BLD AUTO: 10 %
ERYTHROCYTE [DISTWIDTH] IN BLOOD BY AUTOMATED COUNT: 15.9 % (ref 11.6–15.4)
GLUCOSE SERPL-MCNC: 84 MG/DL (ref 65–99)
HCT VFR BLD AUTO: 39.3 % (ref 37.5–51)
HGB BLD-MCNC: 13 G/DL (ref 13–17.7)
IMM GRANULOCYTES # BLD AUTO: 0 X10E3/UL (ref 0–0.1)
IMM GRANULOCYTES NFR BLD AUTO: 0 %
LYMPHOCYTES # BLD AUTO: 1.7 X10E3/UL (ref 0.7–3.1)
LYMPHOCYTES NFR BLD AUTO: 35 %
MCH RBC QN AUTO: 25 PG (ref 26.6–33)
MCHC RBC AUTO-ENTMCNC: 33.1 G/DL (ref 31.5–35.7)
MCV RBC AUTO: 76 FL (ref 79–97)
MONOCYTES # BLD AUTO: 0.5 X10E3/UL (ref 0.1–0.9)
MONOCYTES NFR BLD AUTO: 10 %
NEUTROPHILS # BLD AUTO: 2.2 X10E3/UL (ref 1.4–7)
NEUTROPHILS NFR BLD AUTO: 43 %
PHOSPHATE SERPL-MCNC: 3.4 MG/DL (ref 2.8–4.1)
PLATELET # BLD AUTO: 121 X10E3/UL (ref 150–450)
POTASSIUM SERPL-SCNC: 5.5 MMOL/L (ref 3.5–5.2)
RBC # BLD AUTO: 5.19 X10E6/UL (ref 4.14–5.8)
SODIUM SERPL-SCNC: 144 MMOL/L (ref 134–144)
WBC # BLD AUTO: 5 X10E3/UL (ref 3.4–10.8)

## 2020-06-25 LAB
APPEARANCE UR: CLEAR
BACTERIA #/AREA URNS HPF: ABNORMAL /[HPF]
BACTERIA UR CULT: NORMAL
BILIRUB UR QL STRIP: NEGATIVE
CASTS URNS QL MICRO: ABNORMAL /LPF
COLOR UR: YELLOW
EPI CELLS #/AREA URNS HPF: ABNORMAL /HPF (ref 0–10)
GLUCOSE UR QL: NEGATIVE
HGB UR QL STRIP: NEGATIVE
KETONES UR QL STRIP: NEGATIVE
LEUKOCYTE ESTERASE UR QL STRIP: ABNORMAL
MICRO URNS: ABNORMAL
MUCOUS THREADS URNS QL MICRO: PRESENT
NITRITE UR QL STRIP: NEGATIVE
PH UR STRIP: 5.5 [PH] (ref 5–7.5)
PROT UR QL STRIP: NEGATIVE
RBC #/AREA URNS HPF: ABNORMAL /HPF (ref 0–2)
SP GR UR: 1.02 (ref 1–1.03)
URINALYSIS REFLEX, 377202: ABNORMAL
UROBILINOGEN UR STRIP-MCNC: 0.2 MG/DL (ref 0.2–1)
WBC #/AREA URNS HPF: ABNORMAL /HPF (ref 0–5)

## 2020-12-30 ENCOUNTER — OFFICE VISIT (OUTPATIENT)
Dept: INTERNAL MEDICINE CLINIC | Age: 70
End: 2020-12-30
Payer: MEDICARE

## 2020-12-30 VITALS
BODY MASS INDEX: 14.97 KG/M2 | HEART RATE: 92 BPM | RESPIRATION RATE: 16 BRPM | HEIGHT: 67 IN | TEMPERATURE: 98.1 F | DIASTOLIC BLOOD PRESSURE: 84 MMHG | OXYGEN SATURATION: 96 % | SYSTOLIC BLOOD PRESSURE: 138 MMHG

## 2020-12-30 DIAGNOSIS — Z98.890 S/P CEREBRAL ANEURYSM REPAIR: ICD-10-CM

## 2020-12-30 DIAGNOSIS — I10 ESSENTIAL HYPERTENSION, BENIGN: Primary | ICD-10-CM

## 2020-12-30 DIAGNOSIS — F02.818 DEMENTIA DUE TO ANOTHER GENERAL MEDICAL CONDITION, WITH BEHAVIORAL DISTURBANCE: ICD-10-CM

## 2020-12-30 DIAGNOSIS — Z86.79 S/P CEREBRAL ANEURYSM REPAIR: ICD-10-CM

## 2020-12-30 DIAGNOSIS — S88.911D AMPUTATION OF BOTH LOWER EXTREMITIES, SUBSEQUENT ENCOUNTER (HCC): ICD-10-CM

## 2020-12-30 DIAGNOSIS — R44.3 HALLUCINATION: ICD-10-CM

## 2020-12-30 DIAGNOSIS — R79.9 ABNORMAL FINDING OF BLOOD CHEMISTRY, UNSPECIFIED: ICD-10-CM

## 2020-12-30 DIAGNOSIS — N04.9 NEPHROTIC SYNDROME: ICD-10-CM

## 2020-12-30 DIAGNOSIS — N18.31 STAGE 3A CHRONIC KIDNEY DISEASE (HCC): ICD-10-CM

## 2020-12-30 DIAGNOSIS — R35.1 NOCTURIA: ICD-10-CM

## 2020-12-30 DIAGNOSIS — I10 ESSENTIAL HYPERTENSION: ICD-10-CM

## 2020-12-30 DIAGNOSIS — K40.90 HERNIA, INGUINAL, LEFT: ICD-10-CM

## 2020-12-30 DIAGNOSIS — S88.912D AMPUTATION OF BOTH LOWER EXTREMITIES, SUBSEQUENT ENCOUNTER (HCC): ICD-10-CM

## 2020-12-30 PROCEDURE — G8753 SYS BP > OR = 140: HCPCS | Performed by: INTERNAL MEDICINE

## 2020-12-30 PROCEDURE — G8427 DOCREV CUR MEDS BY ELIG CLIN: HCPCS | Performed by: INTERNAL MEDICINE

## 2020-12-30 PROCEDURE — G8755 DIAS BP > OR = 90: HCPCS | Performed by: INTERNAL MEDICINE

## 2020-12-30 PROCEDURE — 1101F PT FALLS ASSESS-DOCD LE1/YR: CPT | Performed by: INTERNAL MEDICINE

## 2020-12-30 PROCEDURE — 3017F COLORECTAL CA SCREEN DOC REV: CPT | Performed by: INTERNAL MEDICINE

## 2020-12-30 PROCEDURE — G8536 NO DOC ELDER MAL SCRN: HCPCS | Performed by: INTERNAL MEDICINE

## 2020-12-30 PROCEDURE — 36415 COLL VENOUS BLD VENIPUNCTURE: CPT | Performed by: INTERNAL MEDICINE

## 2020-12-30 PROCEDURE — G8419 CALC BMI OUT NRM PARAM NOF/U: HCPCS | Performed by: INTERNAL MEDICINE

## 2020-12-30 PROCEDURE — G8432 DEP SCR NOT DOC, RNG: HCPCS | Performed by: INTERNAL MEDICINE

## 2020-12-30 PROCEDURE — 99214 OFFICE O/P EST MOD 30 MIN: CPT | Performed by: INTERNAL MEDICINE

## 2020-12-30 NOTE — PROGRESS NOTES
SPORTS MEDICINE AND PRIMARY CARE  Narciso Farley MD, [address] [address]  Phone:  [phone]  Fax: [phone]       Chief Complaint   Patient presents with    Hernia (Non Specific)     6 month follow up    . SUBJECTIVE:    Sahara Abreu is a 72 y.o. male Patient returns today with a known history of primary hypertension, hallucinations, left inguinal hernia, dementia, amputation of both lower extremities, status post cerebral aneurysm repair, primary hypertension, nephrotic syndrome, chronic kidney disease, and is seen for evaluation. Patient returns with his wife without new complaints and is seen for evaluation. Current Outpatient Medications   Medication Sig Dispense Refill    donepeziL (ARICEPT) 5 mg tablet Take 1 Tab by mouth nightly. 30 Tab 11    risperiDONE (RisperDAL) 0.5 mg tablet Take 1 Tab by mouth nightly.  30 Tab 11    tamsulosin (FLOMAX) 0.4 mg capsule TAKE 1 CAPSULE BY MOUTH EVERY DAY 90 Cap 3     Past Medical History:   Diagnosis Date    Amputation of both lower extremities (HCC)     tenderness and swollen left breeast    Aneurysm ([address]     Conjunctivitis of right eye 08/30/2016    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 ([address] 04/19/2019    md curly    Gangrene ([facility] 2011    bilateral amputation d/t gangrene    Hallucination     Hernia, inguinal, left 04/01/2019    Hypertension     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    Pancreatic mass     Prediabetes     Renal failure     Dr Norman Acuna S/P colonoscopy 1-4-08 2/26/19    ryan repeat 5 yrs    UTI (urinary tract infection)     Wound, open      Past Surgical History:   Procedure Laterality Date    ABDOMEN SURGERY PROC UNLISTED      colonoscopy    CARDIAC SURG PROCEDURE UNLIST      COLONOSCOPY N/A 8/7/2018    COLONOSCOPY performed by Aneta Morris MD at [address] [place] [address]      bilateral amputation    HX UROLOGICAL left orchiectomy    NEUROLOGICAL PROCEDURE UNLISTED  2004    aneursym     No Known Allergies      REVIEW OF SYSTEMS:  General: negative for - chills or fever  ENT: negative for - headaches, nasal congestion or tinnitus  Respiratory: negative for - cough, hemoptysis, shortness of breath or wheezing  Cardiovascular : negative for - chest pain, edema, palpitations or shortness of breath  Gastrointestinal: negative for - abdominal pain, blood in stools, heartburn or nausea/vomiting  Genito-Urinary: no dysuria, trouble voiding, or hematuria  Musculoskeletal: negative for - gait disturbance, joint pain, joint stiffness or joint swelling  Neurological: no TIA or stroke symptoms  Hematologic: no bruises, no bleeding, no swollen glands  Integument: no lumps, mole changes, nail changes or rash  Endocrine: no malaise/lethargy or unexpected weight changes      Social History     Socioeconomic History    Marital status:      Spouse name: Not on file    Number of children: Not on file    Years of education: Not on file    Highest education level: Not on file   Tobacco Use    Smoking status: Former Smoker     Quit date: 2000     Years since quittin.4    Smokeless tobacco: Never Used   Substance and Sexual Activity    Alcohol use: Yes     Comment: ocassional    Drug use: No    Sexual activity: Not Currently     Partners: Female     Family History   Problem Relation Age of Onset    No Known Problems Father        OBJECTIVE:    Visit Vitals  BP (!) 159/90   Pulse 92   Temp 98.1 °F (36.7 °C) (Oral)   Resp 16   Ht 5' 7\" (1.702 m)   SpO2 96%   BMI 14.97 kg/m²     CONSTITUTIONAL: well , well nourished, appears age appropriate  EYES: perrla, eom intact  ENMT:moist mucous membranes, pharynx clear  NECK: supple.  Thyroid normal  RESPIRATORY: Chest: clear bilaterally   CARDIOVASCULAR: Heart: regular rate and rhythm  GASTROINTESTINAL: Abdomen: soft, bowel sounds active  HEMATOLOGIC: no pathological lymph nodes palpated  MUSCULOSKELETAL: Extremities: no edema, pulse 1+   INTEGUMENT: No unusual rashes or suspicious skin lesions noted. Nails appear normal.  NEUROLOGIC: non-focal exam   MENTAL STATUS: alert and oriented, appropriate affect           ASSESSMENT:  1. Essential hypertension, benign    2. Hallucination    3. Hernia, inguinal, left    4. 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 Amputation of both lower extremities, subsequent encounter ([facility]    6. S/P cerebral aneurysm repair    7. Essential hypertension    8. Nephrotic syndrome    9. Stage 3a chronic kidney disease    10. Nocturia     11. Abnormal finding of blood chemistry, unspecified       I am concerned about his blood pressure, but repeat blood pressure is 138/84, which is acceptable. Hallucinations are no longer present. Wife does note he talks in his sleep and laughs, but no further hallucinations. Left inguinal hernia remains asymptomatic. Dementia is significantly improved. Blood pressure, as noted above, is adequate. We will check a urine protein for significance of nephrotic syndrome, as well as the chronic kidney disease. He will be back to see us in four to six months, sooner if needed. I have discussed the diagnosis with the patient and the intended plan as seen in the  Orders. The patient understands and agees with the plan. The patient has   received an after visit summary and questions were answered concerning  future plans  Patient labs and/or xrays were reviewed  Past records were reviewed. PLAN:  .  Orders Placed This Encounter    URINALYSIS W/ RFLX MICROSCOPIC    CBC WITH AUTOMATED DIFF    METABOLIC PANEL, COMPREHENSIVE    LIPID PANEL    PROSTATE SPECIFIC AG    HEMOGLOBIN A1C WITH EAG    PROTEIN/CREATININE RATIO, URINE ([facility] Only)       Follow-up and Dispositions    · Return in about 4 months (around 4/30/2021).                 ATTENTION:   This medical record was transcribed using an electronic medical records system. Although proofread, it may and can contain electronic and spelling errors. Other human spelling and other errors may be present. Corrections may be executed at a later time. Please feel free to contact us for any clarifications as needed.

## 2020-12-30 NOTE — PROGRESS NOTES
Chief Complaint   Patient presents with    Hernia (Non Specific)     6 month follow up          1. Have you been to the ER, urgent care clinic since your last visit? Hospitalized since your last visit? No    2. Have you seen or consulted any other health care providers outside of the [address] [place] [address] since your last visit? Include any pap smears or colon screening.  No

## 2020-12-31 LAB
ALBUMIN SERPL-MCNC: 3.8 G/DL (ref 3.5–5)
ALBUMIN/GLOB SERPL: 0.8 {RATIO} (ref 1.1–2.2)
ALP SERPL-CCNC: 89 U/L (ref 45–117)
ALT SERPL-CCNC: 24 U/L (ref 12–78)
ANION GAP SERPL CALC-SCNC: 5 MMOL/L (ref 5–15)
APPEARANCE UR: CLEAR
AST SERPL-CCNC: 22 U/L (ref 15–37)
BASOPHILS # BLD: 0.1 K/UL (ref 0–0.1)
BASOPHILS NFR BLD: 2 % (ref 0–1)
BILIRUB SERPL-MCNC: 0.5 MG/DL (ref 0.2–1)
BILIRUB UR QL: NEGATIVE
BUN SERPL-MCNC: 25 MG/DL (ref 6–20)
BUN/CREAT SERPL: 19 (ref 12–20)
CALCIUM SERPL-MCNC: 9.9 MG/DL (ref 8.5–10.1)
CHLORIDE SERPL-SCNC: 103 MMOL/L (ref 97–108)
CHOLEST SERPL-MCNC: 223 MG/DL
CO2 SERPL-SCNC: 30 MMOL/L (ref 21–32)
COLOR UR: NORMAL
CREAT SERPL-MCNC: 1.34 MG/DL (ref 0.7–1.3)
CREAT UR-MCNC: 98.5 MG/DL
DIFFERENTIAL METHOD BLD: ABNORMAL
EOSINOPHIL # BLD: 0.5 K/UL (ref 0–0.4)
EOSINOPHIL NFR BLD: 9 % (ref 0–7)
ERYTHROCYTE [DISTWIDTH] IN BLOOD BY AUTOMATED COUNT: 15.4 % (ref 11.5–14.5)
EST. AVERAGE GLUCOSE BLD GHB EST-MCNC: 111 MG/DL
GLOBULIN SER CALC-MCNC: 4.8 G/DL (ref 2–4)
GLUCOSE SERPL-MCNC: 91 MG/DL (ref 65–100)
GLUCOSE UR STRIP.AUTO-MCNC: NEGATIVE MG/DL
HBA1C MFR BLD: 5.5 % (ref 4–5.6)
HCT VFR BLD AUTO: 36.2 % (ref 36.6–50.3)
HDLC SERPL-MCNC: 92 MG/DL
HDLC SERPL: 2.4 {RATIO} (ref 0–5)
HGB BLD-MCNC: 12.3 G/DL (ref 12.1–17)
HGB UR QL STRIP: NEGATIVE
IMM GRANULOCYTES # BLD AUTO: 0 K/UL (ref 0–0.04)
IMM GRANULOCYTES NFR BLD AUTO: 0 % (ref 0–0.5)
KETONES UR QL STRIP.AUTO: NEGATIVE MG/DL
LDLC SERPL CALC-MCNC: 120.6 MG/DL (ref 0–100)
LEUKOCYTE ESTERASE UR QL STRIP.AUTO: NEGATIVE
LIPID PROFILE,FLP: ABNORMAL
LYMPHOCYTES # BLD: 2.1 K/UL (ref 0.8–3.5)
LYMPHOCYTES NFR BLD: 39 % (ref 12–49)
MCH RBC QN AUTO: 25.1 PG (ref 26–34)
MCHC RBC AUTO-ENTMCNC: 34 G/DL (ref 30–36.5)
MCV RBC AUTO: 73.9 FL (ref 80–99)
MONOCYTES # BLD: 0.6 K/UL (ref 0–1)
MONOCYTES NFR BLD: 11 % (ref 5–13)
NEUTS SEG # BLD: 2.1 K/UL (ref 1.8–8)
NEUTS SEG NFR BLD: 39 % (ref 32–75)
NITRITE UR QL STRIP.AUTO: NEGATIVE
NRBC # BLD: 0 K/UL (ref 0–0.01)
NRBC BLD-RTO: 0 PER 100 WBC
PH UR STRIP: 6 [PH] (ref 5–8)
PLATELET # BLD AUTO: 130 K/UL (ref 150–400)
PMV BLD AUTO: 11.8 FL (ref 8.9–12.9)
POTASSIUM SERPL-SCNC: 4.1 MMOL/L (ref 3.5–5.1)
PROT SERPL-MCNC: 8.6 G/DL (ref 6.4–8.2)
PROT UR STRIP-MCNC: NEGATIVE MG/DL
PROT UR-MCNC: 20 MG/DL (ref 0–11.9)
PROT/CREAT UR-RTO: 0.2
PSA SERPL-MCNC: 0.9 NG/ML (ref 0.01–4)
RBC # BLD AUTO: 4.9 M/UL (ref 4.1–5.7)
SODIUM SERPL-SCNC: 138 MMOL/L (ref 136–145)
SP GR UR REFRACTOMETRY: 1.02 (ref 1–1.03)
TRIGL SERPL-MCNC: 52 MG/DL (ref ?–150)
UROBILINOGEN UR QL STRIP.AUTO: 0.2 EU/DL (ref 0.2–1)
VLDLC SERPL CALC-MCNC: 10.4 MG/DL
WBC # BLD AUTO: 5.4 K/UL (ref 4.1–11.1)

## 2021-04-28 ENCOUNTER — OFFICE VISIT (OUTPATIENT)
Dept: INTERNAL MEDICINE CLINIC | Age: 71
End: 2021-04-28
Payer: MEDICARE

## 2021-04-28 VITALS
SYSTOLIC BLOOD PRESSURE: 144 MMHG | HEIGHT: 67 IN | TEMPERATURE: 97.9 F | RESPIRATION RATE: 18 BRPM | BODY MASS INDEX: 14.97 KG/M2 | DIASTOLIC BLOOD PRESSURE: 90 MMHG | HEART RATE: 82 BPM | OXYGEN SATURATION: 94 %

## 2021-04-28 DIAGNOSIS — S88.912D AMPUTATION OF BOTH LOWER EXTREMITIES, SUBSEQUENT ENCOUNTER (HCC): ICD-10-CM

## 2021-04-28 DIAGNOSIS — Z13.31 SCREENING FOR DEPRESSION: ICD-10-CM

## 2021-04-28 DIAGNOSIS — I10 ESSENTIAL HYPERTENSION: ICD-10-CM

## 2021-04-28 DIAGNOSIS — S88.911D AMPUTATION OF BOTH LOWER EXTREMITIES, SUBSEQUENT ENCOUNTER (HCC): ICD-10-CM

## 2021-04-28 DIAGNOSIS — Z00.00 MEDICARE ANNUAL WELLNESS VISIT, SUBSEQUENT: Primary | ICD-10-CM

## 2021-04-28 DIAGNOSIS — F02.818 DEMENTIA DUE TO ANOTHER GENERAL MEDICAL CONDITION, WITH BEHAVIORAL DISTURBANCE: ICD-10-CM

## 2021-04-28 DIAGNOSIS — N18.31 STAGE 3A CHRONIC KIDNEY DISEASE (HCC): ICD-10-CM

## 2021-04-28 DIAGNOSIS — E78.5 DYSLIPIDEMIA: ICD-10-CM

## 2021-04-28 DIAGNOSIS — E88.09 HYPOALBUMINEMIA: ICD-10-CM

## 2021-04-28 PROCEDURE — 1101F PT FALLS ASSESS-DOCD LE1/YR: CPT | Performed by: INTERNAL MEDICINE

## 2021-04-28 PROCEDURE — G8427 DOCREV CUR MEDS BY ELIG CLIN: HCPCS | Performed by: INTERNAL MEDICINE

## 2021-04-28 PROCEDURE — G8536 NO DOC ELDER MAL SCRN: HCPCS | Performed by: INTERNAL MEDICINE

## 2021-04-28 PROCEDURE — 3017F COLORECTAL CA SCREEN DOC REV: CPT | Performed by: INTERNAL MEDICINE

## 2021-04-28 PROCEDURE — G0439 PPPS, SUBSEQ VISIT: HCPCS | Performed by: INTERNAL MEDICINE

## 2021-04-28 PROCEDURE — 99213 OFFICE O/P EST LOW 20 MIN: CPT | Performed by: INTERNAL MEDICINE

## 2021-04-28 PROCEDURE — G8755 DIAS BP > OR = 90: HCPCS | Performed by: INTERNAL MEDICINE

## 2021-04-28 PROCEDURE — G8753 SYS BP > OR = 140: HCPCS | Performed by: INTERNAL MEDICINE

## 2021-04-28 PROCEDURE — G8432 DEP SCR NOT DOC, RNG: HCPCS | Performed by: INTERNAL MEDICINE

## 2021-04-28 PROCEDURE — G8419 CALC BMI OUT NRM PARAM NOF/U: HCPCS | Performed by: INTERNAL MEDICINE

## 2021-04-28 RX ORDER — ROSUVASTATIN CALCIUM 5 MG/1
5 TABLET, COATED ORAL
Qty: 90 TAB | Refills: 3 | Status: SHIPPED | OUTPATIENT
Start: 2021-04-28 | End: 2021-09-13

## 2021-04-28 RX ORDER — TAMSULOSIN HYDROCHLORIDE 0.4 MG/1
CAPSULE ORAL
Qty: 90 CAP | Refills: 3 | Status: ON HOLD | OUTPATIENT
Start: 2021-04-28 | End: 2022-02-23

## 2021-04-28 NOTE — PATIENT INSTRUCTIONS
Medicare Wellness Visit, Male    The best way to live healthy is to have a lifestyle where you eat a well-balanced diet, exercise regularly, limit alcohol use, and quit all forms of tobacco/nicotine, if applicable. Regular preventive services are another way to keep healthy. Preventive services (vaccines, screening tests, monitoring & exams) can help personalize your care plan, which helps you manage your own care. Screening tests can find health problems at the earliest stages, when they are easiest to treat. [facility] follows the current, evidence-based guidelines published by the [facility] ([facility]) when recommending preventive services for our patients. Because we follow these guidelines, sometimes recommendations change over time as research supports it. (For example, a prostate screening blood test is no longer routinely recommended for men with no symptoms). Of course, you and your doctor may decide to screen more often for some diseases, based on your risk and co-morbidities (chronic disease you are already diagnosed with). Preventive services for you include:  - Medicare offers their members a free annual wellness visit, which is time for you and your primary care provider to discuss and plan for your preventive service needs. Take advantage of this benefit every year!  -All adults over age 72 should receive the recommended pneumonia vaccines. Current USPSTF guidelines recommend a series of two vaccines for the best pneumonia protection.   -All adults should have a flu vaccine yearly and tetanus vaccine every 10 years.  -All adults age 48 and older should receive the shingles vaccines (series of two vaccines).        -All adults age 38-68 who are overweight should have a diabetes screening test once every three years.   -Other screening tests & preventive services for persons with diabetes include: an eye exam to screen for diabetic retinopathy, a kidney function test, a foot exam, and stricter control over your cholesterol.   -Cardiovascular screening for adults with routine risk involves an electrocardiogram (ECG) at intervals determined by the provider.   -Colorectal cancer screening should be done for adults age 54-65 with no increased risk factors for colorectal cancer. There are a number of acceptable methods of screening for this type of cancer. Each test has its own benefits and drawbacks. Discuss with your provider what is most appropriate for you during your annual wellness visit. The different tests include: colonoscopy (considered the best screening method), a fecal occult blood test, a fecal DNA test, and sigmoidoscopy.  -All adults born between [facility] should be screened once for Hepatitis C.  -An Abdominal Aortic Aneurysm (AAA) Screening is recommended for men age 73-68 who has ever smoked in their lifetime.      Here is a list of your current Health Maintenance items (your personalized list of preventive services) with a due date:  Health Maintenance Due   Topic Date Due    COVID-19 Vaccine (1) Never done    Shingles Vaccine (1 of 2) Never done    Pneumococcal Vaccine (1 of 1 - PPSV23) Never done    Pneumococcal Vaccine 65+ years at Risk (1 of 2 - PCV13) Never done

## 2021-04-28 NOTE — PROGRESS NOTES
SPORTS MEDICINE AND PRIMARY CARE  Teofilo Blevins MD, [address] [address]  Phone:  [phone]  Fax: [phone]       Chief Complaint   Patient presents with   [facility] Wellness Visit   . SUBJECTIVE:    Diana Galeano is a 72 y.o. male Patient returns today with his wife with a known history of amputation of both lower extremities, hypertension, dyslipidemia, hypoalbuminemia, dementia, CKD, impaired glucose tolerance, hallucinations and is seen for evaluation. He carries on conversations at night, his wife says. She does not know who he is talking to, it could be a girlfriend, she does not know. Patient is seen for evaluation. Current Outpatient Medications   Medication Sig Dispense Refill    tamsulosin (FLOMAX) 0.4 mg capsule TAKE 1 CAPSULE BY MOUTH EVERY DAY 90 Cap 3    rosuvastatin (CRESTOR) 5 mg tablet Take 1 Tab by mouth nightly. 90 Tab 3    donepeziL (ARICEPT) 5 mg tablet Take 1 Tab by mouth nightly. 30 Tab 11    risperiDONE (RisperDAL) 0.5 mg tablet Take 1 Tab by mouth nightly.  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/2011    Pancreatic mass     Prediabetes     Renal failure     Dr Rod Venegas S/P colonoscopy 1-4-08 2/26/19    ryan repeat 5 yrs    UTI (urinary tract infection)     Wound, open      Past Surgical History:   Procedure Laterality Date    COLONOSCOPY N/A 8/7/2018    COLONOSCOPY performed by Anastasiya Calabrese MD at [address] [place] [address]      bilateral amputation    HX UROLOGICAL      left orchiectomy    NEUROLOGICAL PROCEDURE UNLISTED  2004    aneursym    [place] ABDOMEN SURGERY PROC UNLISTED      colonoscopy    [place] CARDIAC SURG PROCEDURE UNLIST       No Known Allergies      REVIEW OF SYSTEMS:  General: negative for - chills or fever  ENT: negative for - headaches, nasal congestion or tinnitus  Respiratory: negative for - cough, hemoptysis, shortness of breath or wheezing  Cardiovascular : negative for - chest pain, edema, palpitations or shortness of breath  Gastrointestinal: negative for - abdominal pain, blood in stools, heartburn or nausea/vomiting  Genito-Urinary: no dysuria, trouble voiding, or hematuria  Musculoskeletal: negative for - gait disturbance, joint pain, joint stiffness or joint swelling  Neurological: no TIA or stroke symptoms  Hematologic: no bruises, no bleeding, no swollen glands  Integument: no lumps, mole changes, nail changes or rash  Endocrine: no malaise/lethargy or unexpected weight changes      Social History     Socioeconomic History    Marital status:      Spouse name: Not on file    Number of children: Not on file    Years of education: Not on file    Highest education level: Not on file   Tobacco Use    Smoking status: Former Smoker     Quit date: 2000     Years since quittin.7    Smokeless tobacco: Never Used   Substance and Sexual Activity    Alcohol use: Yes     Frequency: Monthly or less     Drinks per session: 1 or 2     Comment: ocassional    Drug use: No    Sexual activity: Not Currently     Partners: Female     Family History   Problem Relation Age of Onset    No Known Problems Father        OBJECTIVE:    Visit Vitals  BP (!) 144/90   Pulse 82   Temp 97.9 °F (36.6 °C) (Oral)   Resp 18   Ht 5' 7\" (1.702 m)   SpO2 94%   BMI 14.97 kg/m²     CONSTITUTIONAL: well , well nourished, appears age appropriate  EYES: perrla, eom intact  ENMT:moist mucous membranes, pharynx clear  NECK: supple.  Thyroid normal  RESPIRATORY: Chest: clear bilaterally   CARDIOVASCULAR: Heart: regular rate and rhythm  GASTROINTESTINAL: Abdomen: soft, bowel sounds active  HEMATOLOGIC: no pathological lymph nodes palpated  MUSCULOSKELETAL: Extremities: no edema, pulse 1+   INTEGUMENT: No unusual rashes or suspicious skin lesions noted. Nails appear normal.  NEUROLOGIC: non-focal exam   MENTAL STATUS: alert and oriented, appropriate affect           ASSESSMENT:  1. Medicare annual wellness visit, subsequent    2. Screening for depression    3. Amputation of both lower extremities, subsequent encounter ([facility]    4. Essential hypertension    5. Dyslipidemia    6. Hypoalbuminemia    7. 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 Stage 3a chronic kidney disease (HCC)      Bilateral amputation of the lower extremities. He needs a pad for the wheelchair, arm rests and pads replaced. We placed the request into [address] [facility]. He is also followed by nephrology, whom he just saw last week and did not have blood in urine. No blood will be taken today. He does have a history of dyslipidemia. I do not see that he is on a cholesterol lowering agent. We suggest he take one and on his next visit we will check his lipid panel. Hallucinations might be related to dementia. He will be back to see us in four to six months, sooner if needed. I have discussed the diagnosis with the patient and the intended plan as seen in the  Orders. The patient understands and agees with the plan. The patient has   received an after visit summary and questions were answered concerning  future plans  Patient labs and/or xrays were reviewed  Past records were reviewed. PLAN:  .  Orders Placed This Encounter    ANNUAL DEPRESSION SCREEN 8-15 MIN    AMB SUPPLY ORDER    tamsulosin (FLOMAX) 0.4 mg capsule    rosuvastatin (CRESTOR) 5 mg tablet       Follow-up and Dispositions    · Return in about 4 months (around 8/28/2021). ATTENTION:   This medical record was transcribed using an electronic medical records system. Although proofread, it may and can contain electronic and spelling errors. Other human spelling and other errors may be present. Corrections may be executed at a later time. Please feel free to contact us for any clarifications as needed.

## 2021-04-28 NOTE — PROGRESS NOTES
1. Have you been to the ER, urgent care clinic since your last visit? Hospitalized since your last visit? No    2. Have you seen or consulted any other health care providers outside of the [address] [place] [address] since your last visit? Include any pap smears or colon screening. No     Wants to discuss wheel chair issues  This is the Subsequent Medicare Annual Wellness Exam, performed 12 months or more after the Initial AWV or the last Subsequent AWV    I have reviewed the patient's medical history in detail and updated the computerized patient record. Assessment/Plan   Education and counseling provided:  Are appropriate based on today's review and evaluation         Depression Risk Factor Screening     3 most recent PHQ Screens 4/28/2021   PHQ Not Done -   Little interest or pleasure in doing things Not at all   Feeling down, depressed, irritable, or hopeless Not at all   Total Score PHQ 2 0   Trouble falling or staying asleep, or sleeping too much -   Feeling tired or having little energy -   Poor appetite, weight loss, or overeating -   Feeling bad about yourself - or that you are a failure or have let yourself or your family down -   Trouble concentrating on things such as school, work, reading, or watching TV -   Moving or speaking so slowly that other people could have noticed; or the opposite being so fidgety that others notice -   Thoughts of being better off dead, or hurting yourself in some way -   PHQ 9 Score -   How difficult have these problems made it for you to do your work, take care of your home and get along with others -       Alcohol Risk Screen    Do you average more than 1 drink per night or more than 7 drinks a week: No    In the past three months have you have had more than 4 drinks containing alcohol on one occasion: No        Functional Ability and Level of Safety    Hearing: Hearing is good. Activities of Daily Living:   The home contains: handrails and grab bars  Patient needs help with:  phone, transportation, shopping, preparing meals, laundry, housework, managing medications, managing money, eating, dressing, bathing, hygiene and bathroom needs      Ambulation: wheelchair bound     Fall Risk:  Fall Risk Assessment, last 12 mths 4/28/2021   Able to walk? No   Fall in past 12 months?  -      Abuse Screen:  Patient is not abused       Cognitive Screening    Has your family/caregiver stated any concerns about your memory: no     Cognitive Screening: not necessary    Health Maintenance Due     Health Maintenance Due   Topic Date Due    COVID-19 Vaccine (1) Never done    Shingrix Vaccine Age 50> (1 of 2) Never done    Pneumococcal 65+ years (1 of 1 - PPSV23) Never done    Pneumococcal 65+ yrs at Risk Vaccine (1 of 2 - PCV13) Never done    Medicare Yearly Exam  01/02/2021       Patient Care Team   Patient Care Team:  Emilia Crawley MD as PCP - General (Internal Medicine)  Emilia Crawley MD as PCP - [facility] Empaneled Provider    History     Patient Active Problem List   Diagnosis Code    Pancreatic mass K86.89    CKD (chronic kidney disease) N18.9    Nephrotic syndrome N04.9    Anasarca R60.1    Hypoalbuminemia E88.09    Hypertension I10    Amputation of both lower extremities ([address] O95.051U, J05.155C    Prediabetes R73.03    Hallucination R44.3    Hernia, inguinal, left K40.90    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, with behavioral disturbance ([address] F02.81    S/P colonoscopy U93.790     Past Medical History:   Diagnosis Date    Amputation of both lower extremities ([address]     tenderness and swollen left breeast    Aneurysm ([address]     Conjunctivitis of right eye 08/30/2016    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 ([address] 04/19/2019    md curly    Gangrene ([address] 2011    bilateral amputation d/t gangrene    Hallucination     Hernia, inguinal, left 04/01/2019    Hypertension     Inferior vena cava embolism ([address] 2011    Pancreatic mass     Prediabetes     Renal failure     Dr Gurvinder Vasquez S/P colonoscopy 08    ryan repeat 5 yrs    UTI (urinary tract infection)     Wound, open       Past Surgical History:   Procedure Laterality Date    COLONOSCOPY N/A 2018    COLONOSCOPY performed by Alona Hanna MD at [address] [place] [address]      bilateral amputation    HX UROLOGICAL      left orchiectomy    NEUROLOGICAL PROCEDURE UNLISTED  2004    aneursym    [place] ABDOMEN SURGERY PROC UNLISTED      colonoscopy    [place] CARDIAC SURG PROCEDURE UNLIST       Current Outpatient Medications   Medication Sig Dispense Refill    donepeziL (ARICEPT) 5 mg tablet Take 1 Tab by mouth nightly. 30 Tab 11    risperiDONE (RisperDAL) 0.5 mg tablet Take 1 Tab by mouth nightly.  30 Tab 11    tamsulosin (FLOMAX) 0.4 mg capsule TAKE 1 CAPSULE BY MOUTH EVERY DAY 90 Cap 3     No Known Allergies    Family History   Problem Relation Age of Onset    No Known Problems Father      Social History     Tobacco Use    Smoking status: Former Smoker     Quit date: 2000     Years since quittin.7    Smokeless tobacco: Never Used   Substance Use Topics    Alcohol use: Yes     Frequency: Monthly or less     Drinks per session: 1 or 2     Comment: ocassional Sheryle Ridge, LPN

## 2021-06-30 DIAGNOSIS — F02.818 DEMENTIA DUE TO ANOTHER GENERAL MEDICAL CONDITION, WITH BEHAVIORAL DISTURBANCE: ICD-10-CM

## 2021-06-30 RX ORDER — RISPERIDONE 0.5 MG/1
0.5 TABLET, FILM COATED ORAL
Qty: 30 TABLET | Refills: 11 | Status: SHIPPED | OUTPATIENT
Start: 2021-06-30 | End: 2022-03-22 | Stop reason: SDUPTHER

## 2021-06-30 RX ORDER — DONEPEZIL HYDROCHLORIDE 5 MG/1
5 TABLET, FILM COATED ORAL
Qty: 30 TABLET | Refills: 11 | Status: SHIPPED | OUTPATIENT
Start: 2021-06-30 | End: 2022-07-22

## 2021-09-13 ENCOUNTER — OFFICE VISIT (OUTPATIENT)
Dept: INTERNAL MEDICINE CLINIC | Age: 71
End: 2021-09-13
Payer: MEDICARE

## 2021-09-13 VITALS
OXYGEN SATURATION: 95 % | BODY MASS INDEX: 15.43 KG/M2 | HEIGHT: 66 IN | TEMPERATURE: 98.1 F | DIASTOLIC BLOOD PRESSURE: 67 MMHG | RESPIRATION RATE: 18 BRPM | SYSTOLIC BLOOD PRESSURE: 127 MMHG | HEART RATE: 87 BPM

## 2021-09-13 DIAGNOSIS — S88.912D AMPUTATION OF BOTH LOWER EXTREMITIES, SUBSEQUENT ENCOUNTER (HCC): ICD-10-CM

## 2021-09-13 DIAGNOSIS — E78.5 DYSLIPIDEMIA: ICD-10-CM

## 2021-09-13 DIAGNOSIS — I10 ESSENTIAL HYPERTENSION: ICD-10-CM

## 2021-09-13 DIAGNOSIS — F02.818 DEMENTIA DUE TO ANOTHER GENERAL MEDICAL CONDITION, WITH BEHAVIORAL DISTURBANCE: ICD-10-CM

## 2021-09-13 DIAGNOSIS — N18.31 STAGE 3A CHRONIC KIDNEY DISEASE (HCC): Primary | ICD-10-CM

## 2021-09-13 DIAGNOSIS — F22 DELUSIONAL DISORDERS (HCC): ICD-10-CM

## 2021-09-13 DIAGNOSIS — S88.911D AMPUTATION OF BOTH LOWER EXTREMITIES, SUBSEQUENT ENCOUNTER (HCC): ICD-10-CM

## 2021-09-13 DIAGNOSIS — N04.9 NEPHROTIC SYNDROME: ICD-10-CM

## 2021-09-13 PROCEDURE — 99214 OFFICE O/P EST MOD 30 MIN: CPT | Performed by: INTERNAL MEDICINE

## 2021-09-13 PROCEDURE — 1101F PT FALLS ASSESS-DOCD LE1/YR: CPT | Performed by: INTERNAL MEDICINE

## 2021-09-13 PROCEDURE — G8419 CALC BMI OUT NRM PARAM NOF/U: HCPCS | Performed by: INTERNAL MEDICINE

## 2021-09-13 PROCEDURE — G8432 DEP SCR NOT DOC, RNG: HCPCS | Performed by: INTERNAL MEDICINE

## 2021-09-13 PROCEDURE — G8752 SYS BP LESS 140: HCPCS | Performed by: INTERNAL MEDICINE

## 2021-09-13 PROCEDURE — G8536 NO DOC ELDER MAL SCRN: HCPCS | Performed by: INTERNAL MEDICINE

## 2021-09-13 PROCEDURE — G8427 DOCREV CUR MEDS BY ELIG CLIN: HCPCS | Performed by: INTERNAL MEDICINE

## 2021-09-13 PROCEDURE — 3017F COLORECTAL CA SCREEN DOC REV: CPT | Performed by: INTERNAL MEDICINE

## 2021-09-13 PROCEDURE — G8754 DIAS BP LESS 90: HCPCS | Performed by: INTERNAL MEDICINE

## 2021-09-13 RX ORDER — ATORVASTATIN CALCIUM 10 MG/1
10 TABLET, FILM COATED ORAL DAILY
Qty: 30 TABLET | Refills: 11 | Status: SHIPPED | OUTPATIENT
Start: 2021-09-13 | End: 2022-10-12

## 2021-09-13 NOTE — PROGRESS NOTES
1. Have you been to the ER, urgent care clinic since your last visit? Hospitalized since your last visit? No    2. Have you seen or consulted any other health care providers outside of the [address] [place] [address] since your last visit? Include any pap smears or colon screening.  No    Not taking cholesterol medication it was to expensive

## 2021-09-13 NOTE — PROGRESS NOTES
SPORTS MEDICINE AND PRIMARY CARE  Lisbeth Bryant MD, [address] [address]  Phone:  [phone]  Fax: [phone]       Chief Complaint   Patient presents with    Hypertension   . SUBJECTIVE:    Hernesto Arias is a 80 y.o. male *Patient returns today with a known history of chronic kidney disease, delusional disorder, nephrotic syndrome, primary hypertension, impaired glucose tolerance, dementia, dyslipidemia, bilateral amputations and is seen for evaluation. Patient returns today with his wife, who notes that he gets confused frequently and is more demanding as time goes on. Other new complaints denied and patient is seen for evaluation. She notes the cholesterol medicine Crestor was too expensive and therefore we will give him Atorvastatin and see if it will allow his cholesterol to come down at a decent price. Patient is seen for evaluation. Current Outpatient Medications   Medication Sig Dispense Refill    atorvastatin (LIPITOR) 10 mg tablet Take 1 Tablet by mouth daily. 30 Tablet 11    risperiDONE (RisperDAL) 0.5 mg tablet Take 1 Tablet by mouth nightly. 30 Tablet 11    donepeziL (ARICEPT) 5 mg tablet Take 1 Tablet by mouth nightly.  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    Pancreatic mass     Prediabetes     Renal failure     Dr China Scott S/P colonoscopy 1-4-08 2/26/19    ryan repeat 5 yrs    UTI (urinary tract infection)     Wound, open      Past Surgical History:   Procedure Laterality Date    COLONOSCOPY N/A 2018    COL[facility] Ada Fothergill[facility] HX ORTHOPAEDIC      bilateral amputation    HX UROLOGICAL      left orchiectomy    NEUROLOGICAL PROCEDURE UNLISTED  2004    aneursym    [place] ABDOMEN SURGERY PROC UNLISTED      colonoscopy    [place] CARDIAC SURG PROCEDURE UNLIST       No Known Allergies      REVIEW OF SYSTEMS:  General: negative for - chills or fever  ENT: negative for - headaches, nasal congestion or tinnitus  Respiratory: negative for - cough, hemoptysis, shortness of breath or wheezing  Cardiovascular : negative for - chest pain, edema, palpitations or shortness of breath  Gastrointestinal: negative for - abdominal pain, blood in stools, heartburn or nausea/vomiting  Genito-Urinary: no dysuria, trouble voiding, or hematuria  Musculoskeletal: negative for - gait disturbance, joint pain, joint stiffness or joint swelling  Neurological: no TIA or stroke symptoms  Hematologic: no bruises, no bleeding, no swollen glands  Integument: no lumps, mole changes, nail changes or rash  Endocrine: no malaise/lethargy or unexpected weight changes      Social History     Socioeconomic History    Marital status:      Spouse name: Not on file    Number of children: Not on file    Years of education: Not on file    Highest education level: Not on file   Tobacco Use    Smoking status: Former Smoker     Quit date: 2000     Years since quittin.1    Smokeless tobacco: Never Used   Vaping Use    Vaping Use: Never used   Substance and Sexual Activity    Alcohol use: Yes     Comment: ocassional    Drug use: No    Sexual activity: Not Currently     Partners: Female     Social Determinants of Health     Financial Resource Strain:     Difficulty of Paying Living Expenses:    Food Insecurity:     Worried About Running Out of Food in the Last Year:     Ran Out of Food in the Last Year:    Transportation Needs:     Lack of Transportation (Medical):  Lack of Transportation (Non-Medical):    Physical Activity:     Days of Exercise per Week:     Minutes of Exercise per Session:    Stress:     Feeling of Stress :    Social Connections:     Frequency of Communication with Friends and Family:     Frequency of Social Gatherings with Friends and Family:     Attends [employer] Services:     Active Member of Clubs or Organizations:     Attends Club or Organization Meetings:     Marital Status:      Family History   Problem Relation Age of Onset    No Known Problems Father        OBJECTIVE:    Visit Vitals  /67   Pulse 87   Temp 98.1 °F (36.7 °C) (Oral)   Resp 18   Ht 5' 6\" (1.676 m)   SpO2 95%   BMI 15.43 kg/m²     CONSTITUTIONAL: well , well nourished, appears age appropriate  EYES: perrla, eom intact  ENMT:moist mucous membranes, pharynx clear  NECK: supple. Thyroid normal  RESPIRATORY: Chest: clear bilaterally   CARDIOVASCULAR: Heart: regular rate and rhythm  GASTROINTESTINAL: Abdomen: soft, bowel sounds active  HEMATOLOGIC: no pathological lymph nodes palpated  MUSCULOSKELETAL: Extremities: no edema, pulse 1+   INTEGUMENT: No unusual rashes or suspicious skin lesions noted. Nails appear normal.  NEUROLOGIC: non-focal exam   MENTAL STATUS: alert and oriented, appropriate affect           ASSESSMENT:  1. Stage 3a chronic kidney disease ([address]    2. Delusional disorders ([address]    3. Nephrotic syndrome    4. Essential hypertension    5. 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 Dyslipidemia    7. Amputation of both lower extremities, subsequent encounter ([facility]      On his list visit his renal function had returned to baseline normal.   He has no significant proteinuria. Delusional disorder has resolved. He does sometimes talk in his sleep.     Blood pressure control is at goal.    Dementia continues, but not any worse. For his dyslipidemia we use Atorvastatin, which should be more affordable for him. Bilateral amputation is again noted. He will be back to see us in about four months. That will give us a chance to see how his cholesterol is responding to the Atorvastatin. I have discussed the diagnosis with the patient and the intended plan as seen in the  Orders. The patient understands and agees with the plan. The patient has   received an after visit summary and questions were answered concerning  future plans  Patient labs and/or xrays were reviewed  Past records were reviewed. PLAN:  .  Orders Placed This Encounter    atorvastatin (LIPITOR) 10 mg tablet                  ATTENTION:   This medical record was transcribed using an electronic medical records system. Although proofread, it may and can contain electronic and spelling errors. Other human spelling and other errors may be present. Corrections may be executed at a later time. Please feel free to contact us for any clarifications as needed.

## 2021-12-13 ENCOUNTER — OFFICE VISIT (OUTPATIENT)
Dept: INTERNAL MEDICINE CLINIC | Age: 71
End: 2021-12-13
Payer: MEDICARE

## 2021-12-13 VITALS
RESPIRATION RATE: 18 BRPM | DIASTOLIC BLOOD PRESSURE: 78 MMHG | HEIGHT: 66 IN | OXYGEN SATURATION: 96 % | HEART RATE: 86 BPM | TEMPERATURE: 97.9 F | SYSTOLIC BLOOD PRESSURE: 152 MMHG | BODY MASS INDEX: 15.43 KG/M2

## 2021-12-13 DIAGNOSIS — S88.911D AMPUTATION OF BOTH LOWER EXTREMITIES, SUBSEQUENT ENCOUNTER (HCC): ICD-10-CM

## 2021-12-13 DIAGNOSIS — F02.818 DEMENTIA DUE TO ANOTHER GENERAL MEDICAL CONDITION, WITH BEHAVIORAL DISTURBANCE: ICD-10-CM

## 2021-12-13 DIAGNOSIS — G24.01 TARDIVE DYSKINESIA: ICD-10-CM

## 2021-12-13 DIAGNOSIS — R35.1 NOCTURIA: ICD-10-CM

## 2021-12-13 DIAGNOSIS — S88.912D AMPUTATION OF BOTH LOWER EXTREMITIES, SUBSEQUENT ENCOUNTER (HCC): ICD-10-CM

## 2021-12-13 DIAGNOSIS — R73.02 IGT (IMPAIRED GLUCOSE TOLERANCE): ICD-10-CM

## 2021-12-13 DIAGNOSIS — E78.5 DYSLIPIDEMIA: Primary | ICD-10-CM

## 2021-12-13 DIAGNOSIS — I10 PRIMARY HYPERTENSION: ICD-10-CM

## 2021-12-13 DIAGNOSIS — N18.31 STAGE 3A CHRONIC KIDNEY DISEASE (HCC): ICD-10-CM

## 2021-12-13 PROCEDURE — G8753 SYS BP > OR = 140: HCPCS | Performed by: INTERNAL MEDICINE

## 2021-12-13 PROCEDURE — G8419 CALC BMI OUT NRM PARAM NOF/U: HCPCS | Performed by: INTERNAL MEDICINE

## 2021-12-13 PROCEDURE — G8536 NO DOC ELDER MAL SCRN: HCPCS | Performed by: INTERNAL MEDICINE

## 2021-12-13 PROCEDURE — G8427 DOCREV CUR MEDS BY ELIG CLIN: HCPCS | Performed by: INTERNAL MEDICINE

## 2021-12-13 PROCEDURE — 99215 OFFICE O/P EST HI 40 MIN: CPT | Performed by: INTERNAL MEDICINE

## 2021-12-13 PROCEDURE — G8510 SCR DEP NEG, NO PLAN REQD: HCPCS | Performed by: INTERNAL MEDICINE

## 2021-12-13 PROCEDURE — G8754 DIAS BP LESS 90: HCPCS | Performed by: INTERNAL MEDICINE

## 2021-12-13 PROCEDURE — 3017F COLORECTAL CA SCREEN DOC REV: CPT | Performed by: INTERNAL MEDICINE

## 2021-12-13 PROCEDURE — 1101F PT FALLS ASSESS-DOCD LE1/YR: CPT | Performed by: INTERNAL MEDICINE

## 2021-12-13 NOTE — PROGRESS NOTES
1. Have you been to the ER, urgent care clinic since your last visit? Hospitalized since your last visit? No    2. Have you seen or consulted any other health care providers outside of the [address] [place] [address] since your last visit? Include any pap smears or colon screening.  No

## 2021-12-13 NOTE — PROGRESS NOTES
SPORTS MEDICINE AND PRIMARY CARE  Salma Barrios MD, [address] [address]  Phone:  [phone]  Fax: [phone]       Chief Complaint   Patient presents with    Memory Loss   . SUBJECTIVE:    Kristopher Cerna is a 80 y.o. male Patient returns today with a known history of dementia, dyslipidemia, status post bilateral amputation, primary hypertension, chronic kidney disease stage 3, impaired glucose tolerance and is seen for evaluation. His wife notes that he shakes periodically. He shakes so severely at times that he actually shakes the whole car, she notes. Dr. Master Guillen, his nephrologist, has noticed it also and wonders if it is related to medications. Patient is seen for evaluation. Current Outpatient Medications   Medication Sig Dispense Refill    atorvastatin (LIPITOR) 10 mg tablet Take 1 Tablet by mouth daily. 30 Tablet 11    risperiDONE (RisperDAL) 0.5 mg tablet Take 1 Tablet by mouth nightly. 30 Tablet 11    donepeziL (ARICEPT) 5 mg tablet Take 1 Tablet by mouth nightly.  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    Pancreatic mass     Prediabetes     Renal failure     Dr Kristin Nair S/P colonoscopy 1-4-08 2/26/19    ryan repeat 5 yrs    UTI (urinary tract infection)     Wound, open      Past Surgical History:   Procedure Laterality Date    COLONOSCOPY N/A 8/7/2018    COLONOSCOPY performed by Jimmy Wahsburn Ramona Fung MD at [facility] [address] HX ORTHOPAEDIC      bilateral amputation    HX UROLOGICAL      left orchiectomy    NEUROLOGICAL PROCEDURE UNLISTED  2004    aneursym    NC ABDOMEN SURGERY PROC UNLISTED      colonoscopy    NC CARDIAC SURG PROCEDURE UNLIST       No Known Allergies      REVIEW OF SYSTEMS:  General: negative for - chills or fever  ENT: negative for - headaches, nasal congestion or tinnitus  Respiratory: negative for - cough, hemoptysis, shortness of breath or wheezing  Cardiovascular : negative for - chest pain, edema, palpitations or shortness of breath  Gastrointestinal: negative for - abdominal pain, blood in stools, heartburn or nausea/vomiting  Genito-Urinary: no dysuria, trouble voiding, or hematuria  Musculoskeletal: negative for - gait disturbance, joint pain, joint stiffness or joint swelling  Neurological: no TIA or stroke symptoms  Hematologic: no bruises, no bleeding, no swollen glands  Integument: no lumps, mole changes, nail changes or rash  Endocrine: no malaise/lethargy or unexpected weight changes      Social History     Socioeconomic History    Marital status:    Tobacco Use    Smoking status: Former Smoker     Quit date: 2000     Years since quittin.3    Smokeless tobacco: Never Used   Vaping Use    Vaping Use: Never used   Substance and Sexual Activity    Alcohol use: Yes     Comment: ocassional    Drug use: No    Sexual activity: Not Currently     Partners: Female     Family History   Problem Relation Age of Onset    No Known Problems Father        OBJECTIVE:    Visit Vitals  BP (!) 152/78   Pulse 86   Temp 97.9 °F (36.6 °C) (Oral)   Resp 18   Ht 5' 6\" (1.676 m)   SpO2 96%   BMI 15.43 kg/m²     CONSTITUTIONAL: well , well nourished, appears age appropriate  EYES: perrla, eom intact  ENMT:moist mucous membranes, pharynx clear  NECK: supple.  Thyroid normal  RESPIRATORY: Chest: clear bilaterally   CARDIOVASCULAR: Heart: regular rate and rhythm  GASTROINTESTINAL: Abdomen: soft, bowel sounds active  HEMATOLOGIC: no pathological lymph nodes palpated  MUSCULOSKELETAL: Extremities: no edema, pulse 1+   INTEGUMENT: No unusual rashes or suspicious skin lesions noted. Nails appear normal.  NEUROLOGIC: non-focal exam   MENTAL STATUS: alert and oriented, appropriate affect           ASSESSMENT:  1. Dyslipidemia    2. 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, with behavioral disturbance ([facility]    3. IGT (impaired glucose tolerance)    4. Amputation of both lower extremities, subsequent encounter ([facility]    5. Primary hypertension    6. Stage 3a chronic kidney disease ([facility]    7. Nocturia     8. Tardive dyskinesia      Comes in for his yearly checkup. We will check his cholesterol and report to him the results with a note in the mail. He has dementia that seems to be stable. He has been on Risperdal _______________ (inaudible), however I think the Risperdal is causing the tardive dyskinesia, which is a chronic jerk that he has and can be severe. She is going to stop Risperdal and assuming he has no behavior disturbances, then we will add Cogentin if the tardive dyskinesia does not improve or slack off. We note it could be irreversible. BP is up somewhat today. Repeat blood pressure is 140/78. I suspect it drops down to normal at home, no adjustments or additions will be made. He has chronic kidney disease stage 3, for which we will check renal status today. Nocturia, for which we will check PSA today. As discussed above, we will stop Risperdal and see how he does, see if there is any reversibility to the condition and if not, add Cogentin. I have discussed the diagnosis with the patient and the intended plan as seen in the  Orders. The patient understands and agees with the plan.   The patient has   received an after visit summary and questions were answered concerning  future plans  Patient labs and/or xrays were reviewed  Past records were reviewed. PLAN:  .  Orders Placed This Encounter    URINALYSIS W/ RFLX MICROSCOPIC    CBC WITH AUTOMATED DIFF    METABOLIC PANEL, COMPREHENSIVE    LIPID PANEL    PROSTATE SPECIFIC AG    APOLIPOPROTEIN B    HEMOGLOBIN A1C WITH EAG       Follow-up and Dispositions    · Return in about 4 months (around 4/13/2022). ATTENTION:   This medical record was transcribed using an electronic medical records system. Although proofread, it may and can contain electronic and spelling errors. Other human spelling and other errors may be present. Corrections may be executed at a later time. Please feel free to contact us for any clarifications as needed.

## 2021-12-14 LAB
ALBUMIN SERPL-MCNC: 3.9 G/DL (ref 3.5–5)
ALBUMIN/GLOB SERPL: 0.8 {RATIO} (ref 1.1–2.2)
ALP SERPL-CCNC: 102 U/L (ref 45–117)
ALT SERPL-CCNC: 18 U/L (ref 12–78)
ANION GAP SERPL CALC-SCNC: 6 MMOL/L (ref 5–15)
APPEARANCE UR: CLEAR
AST SERPL-CCNC: 15 U/L (ref 15–37)
BACTERIA URNS QL MICRO: NEGATIVE /HPF
BASOPHILS # BLD: 0.1 K/UL (ref 0–0.1)
BASOPHILS NFR BLD: 1 % (ref 0–1)
BILIRUB SERPL-MCNC: 0.5 MG/DL (ref 0.2–1)
BILIRUB UR QL: NEGATIVE
BUN SERPL-MCNC: 17 MG/DL (ref 6–20)
BUN/CREAT SERPL: 14 (ref 12–20)
CALCIUM SERPL-MCNC: 10.6 MG/DL (ref 8.5–10.1)
CHLORIDE SERPL-SCNC: 106 MMOL/L (ref 97–108)
CHOLEST SERPL-MCNC: 168 MG/DL
CO2 SERPL-SCNC: 28 MMOL/L (ref 21–32)
COLOR UR: ABNORMAL
CREAT SERPL-MCNC: 1.25 MG/DL (ref 0.7–1.3)
DIFFERENTIAL METHOD BLD: ABNORMAL
EOSINOPHIL # BLD: 0.3 K/UL (ref 0–0.4)
EOSINOPHIL NFR BLD: 6 % (ref 0–7)
EPITH CASTS URNS QL MICRO: ABNORMAL /LPF
ERYTHROCYTE [DISTWIDTH] IN BLOOD BY AUTOMATED COUNT: 15.1 % (ref 11.5–14.5)
EST. AVERAGE GLUCOSE BLD GHB EST-MCNC: 108 MG/DL
GLOBULIN SER CALC-MCNC: 4.7 G/DL (ref 2–4)
GLUCOSE SERPL-MCNC: 89 MG/DL (ref 65–100)
GLUCOSE UR STRIP.AUTO-MCNC: NEGATIVE MG/DL
HBA1C MFR BLD: 5.4 % (ref 4–5.6)
HCT VFR BLD AUTO: 36.2 % (ref 36.6–50.3)
HDLC SERPL-MCNC: 82 MG/DL
HDLC SERPL: 2 {RATIO} (ref 0–5)
HGB BLD-MCNC: 12.1 G/DL (ref 12.1–17)
HGB UR QL STRIP: NEGATIVE
HYALINE CASTS URNS QL MICRO: ABNORMAL /LPF (ref 0–5)
IMM GRANULOCYTES # BLD AUTO: 0 K/UL (ref 0–0.04)
IMM GRANULOCYTES NFR BLD AUTO: 0 % (ref 0–0.5)
KETONES UR QL STRIP.AUTO: NEGATIVE MG/DL
LDLC SERPL CALC-MCNC: 77.4 MG/DL (ref 0–100)
LEUKOCYTE ESTERASE UR QL STRIP.AUTO: NEGATIVE
LYMPHOCYTES # BLD: 2.1 K/UL (ref 0.8–3.5)
LYMPHOCYTES NFR BLD: 39 % (ref 12–49)
MCH RBC QN AUTO: 25.4 PG (ref 26–34)
MCHC RBC AUTO-ENTMCNC: 33.4 G/DL (ref 30–36.5)
MCV RBC AUTO: 76.1 FL (ref 80–99)
MONOCYTES # BLD: 0.5 K/UL (ref 0–1)
MONOCYTES NFR BLD: 9 % (ref 5–13)
NEUTS SEG # BLD: 2.5 K/UL (ref 1.8–8)
NEUTS SEG NFR BLD: 45 % (ref 32–75)
NITRITE UR QL STRIP.AUTO: NEGATIVE
NRBC # BLD: 0 K/UL (ref 0–0.01)
NRBC BLD-RTO: 0 PER 100 WBC
PH UR STRIP: 6.5 [PH] (ref 5–8)
PLATELET # BLD AUTO: 124 K/UL (ref 150–400)
POTASSIUM SERPL-SCNC: 4.7 MMOL/L (ref 3.5–5.1)
PROT SERPL-MCNC: 8.6 G/DL (ref 6.4–8.2)
PROT UR STRIP-MCNC: ABNORMAL MG/DL
PSA SERPL-MCNC: 0.8 NG/ML (ref 0.01–4)
RBC # BLD AUTO: 4.76 M/UL (ref 4.1–5.7)
RBC #/AREA URNS HPF: ABNORMAL /HPF (ref 0–5)
SODIUM SERPL-SCNC: 140 MMOL/L (ref 136–145)
SP GR UR REFRACTOMETRY: 1.02 (ref 1–1.03)
TRIGL SERPL-MCNC: 43 MG/DL (ref ?–150)
UROBILINOGEN UR QL STRIP.AUTO: 0.2 EU/DL (ref 0.2–1)
VLDLC SERPL CALC-MCNC: 8.6 MG/DL
WBC # BLD AUTO: 5.4 K/UL (ref 4.1–11.1)
WBC URNS QL MICRO: ABNORMAL /HPF (ref 0–4)

## 2021-12-15 LAB — APO B SERPL-MCNC: 65 MG/DL

## 2022-01-28 ENCOUNTER — APPOINTMENT (OUTPATIENT)
Dept: CT IMAGING | Age: 72
DRG: 871 | End: 2022-01-28
Attending: STUDENT IN AN ORGANIZED HEALTH CARE EDUCATION/TRAINING PROGRAM
Payer: MEDICARE

## 2022-01-28 ENCOUNTER — APPOINTMENT (OUTPATIENT)
Dept: CT IMAGING | Age: 72
DRG: 871 | End: 2022-01-28
Attending: EMERGENCY MEDICINE
Payer: MEDICARE

## 2022-01-28 ENCOUNTER — APPOINTMENT (OUTPATIENT)
Dept: GENERAL RADIOLOGY | Age: 72
DRG: 871 | End: 2022-01-28
Attending: EMERGENCY MEDICINE
Payer: MEDICARE

## 2022-01-28 ENCOUNTER — HOSPITAL ENCOUNTER (INPATIENT)
Age: 72
LOS: 3 days | Discharge: HOME | DRG: 871 | End: 2022-01-31
Attending: EMERGENCY MEDICINE | Admitting: STUDENT IN AN ORGANIZED HEALTH CARE EDUCATION/TRAINING PROGRAM
Payer: MEDICARE

## 2022-01-28 DIAGNOSIS — N39.0 ACUTE UTI: Primary | ICD-10-CM

## 2022-01-28 DIAGNOSIS — A41.9 SEPSIS, DUE TO UNSPECIFIED ORGANISM, UNSPECIFIED WHETHER ACUTE ORGAN DYSFUNCTION PRESENT (HCC): ICD-10-CM

## 2022-01-28 LAB
ALBUMIN SERPL-MCNC: 3.2 G/DL (ref 3.5–5)
ALBUMIN/GLOB SERPL: 0.6 {RATIO} (ref 1.1–2.2)
ALP SERPL-CCNC: 109 U/L (ref 45–117)
ALT SERPL-CCNC: 17 U/L (ref 12–78)
ANION GAP SERPL CALC-SCNC: 4 MMOL/L (ref 5–15)
APPEARANCE UR: ABNORMAL
AST SERPL-CCNC: 27 U/L (ref 15–37)
BACTERIA URNS QL MICRO: ABNORMAL /HPF
BASOPHILS # BLD: 0.1 K/UL (ref 0–0.1)
BASOPHILS NFR BLD: 0 % (ref 0–1)
BILIRUB SERPL-MCNC: 1 MG/DL (ref 0.2–1)
BILIRUB UR QL CFM: NEGATIVE
BUN SERPL-MCNC: 34 MG/DL (ref 6–20)
BUN/CREAT SERPL: 18 (ref 12–20)
CALCIUM SERPL-MCNC: 9.9 MG/DL (ref 8.5–10.1)
CHLORIDE SERPL-SCNC: 100 MMOL/L (ref 97–108)
CK SERPL-CCNC: 206 U/L (ref 39–308)
CO2 SERPL-SCNC: 29 MMOL/L (ref 21–32)
COLOR UR: ABNORMAL
CREAT SERPL-MCNC: 1.84 MG/DL (ref 0.7–1.3)
DIFFERENTIAL METHOD BLD: ABNORMAL
EOSINOPHIL # BLD: 0 K/UL (ref 0–0.4)
EOSINOPHIL NFR BLD: 0 % (ref 0–7)
EPITH CASTS URNS QL MICRO: ABNORMAL /LPF
ERYTHROCYTE [DISTWIDTH] IN BLOOD BY AUTOMATED COUNT: 14.6 % (ref 11.5–14.5)
FLUAV RNA SPEC QL NAA+PROBE: NOT DETECTED
FLUBV RNA SPEC QL NAA+PROBE: NOT DETECTED
GLOBULIN SER CALC-MCNC: 5.4 G/DL (ref 2–4)
GLUCOSE SERPL-MCNC: 122 MG/DL (ref 65–100)
GLUCOSE UR STRIP.AUTO-MCNC: NEGATIVE MG/DL
HCT VFR BLD AUTO: 38.6 % (ref 36.6–50.3)
HGB BLD-MCNC: 13.3 G/DL (ref 12.1–17)
HGB UR QL STRIP: ABNORMAL
IMM GRANULOCYTES # BLD AUTO: 0.2 K/UL (ref 0–0.04)
IMM GRANULOCYTES NFR BLD AUTO: 1 % (ref 0–0.5)
KETONES UR QL STRIP.AUTO: NEGATIVE MG/DL
LACTATE SERPL-SCNC: 1 MMOL/L (ref 0.4–2)
LEUKOCYTE ESTERASE UR QL STRIP.AUTO: ABNORMAL
LYMPHOCYTES # BLD: 2 K/UL (ref 0.8–3.5)
LYMPHOCYTES NFR BLD: 9 % (ref 12–49)
MCH RBC QN AUTO: 24.9 PG (ref 26–34)
MCHC RBC AUTO-ENTMCNC: 34.5 G/DL (ref 30–36.5)
MCV RBC AUTO: 72.1 FL (ref 80–99)
MONOCYTES # BLD: 2 K/UL (ref 0–1)
MONOCYTES NFR BLD: 9 % (ref 5–13)
NEUTS SEG # BLD: 17.7 K/UL (ref 1.8–8)
NEUTS SEG NFR BLD: 80 % (ref 32–75)
NITRITE UR QL STRIP.AUTO: POSITIVE
NRBC # BLD: 0 K/UL (ref 0–0.01)
NRBC BLD-RTO: 0 PER 100 WBC
PH UR STRIP: 5.5 [PH] (ref 5–8)
PLATELET # BLD AUTO: 110 K/UL (ref 150–400)
PMV BLD AUTO: 11.7 FL (ref 8.9–12.9)
POTASSIUM SERPL-SCNC: 3.8 MMOL/L (ref 3.5–5.1)
PROT SERPL-MCNC: 8.6 G/DL (ref 6.4–8.2)
PROT UR STRIP-MCNC: 100 MG/DL
RBC # BLD AUTO: 5.35 M/UL (ref 4.1–5.7)
RBC #/AREA URNS HPF: ABNORMAL /HPF (ref 0–5)
SARS-COV-2, COV2: NOT DETECTED
SODIUM SERPL-SCNC: 133 MMOL/L (ref 136–145)
SP GR UR REFRACTOMETRY: 1.02 (ref 1–1.03)
UA: UC IF INDICATED,UAUC: ABNORMAL
UROBILINOGEN UR QL STRIP.AUTO: 1 EU/DL (ref 0.2–1)
WBC # BLD AUTO: 22 K/UL (ref 4.1–11.1)
WBC URNS QL MICRO: ABNORMAL /HPF (ref 0–4)

## 2022-01-28 PROCEDURE — 94760 N-INVAS EAR/PLS OXIMETRY 1: CPT

## 2022-01-28 PROCEDURE — 65270000032 HC RM SEMIPRIVATE

## 2022-01-28 PROCEDURE — 74011000250 HC RX REV CODE- 250: Performed by: STUDENT IN AN ORGANIZED HEALTH CARE EDUCATION/TRAINING PROGRAM

## 2022-01-28 PROCEDURE — 74176 CT ABD & PELVIS W/O CONTRAST: CPT

## 2022-01-28 PROCEDURE — 74011250636 HC RX REV CODE- 250/636: Performed by: EMERGENCY MEDICINE

## 2022-01-28 PROCEDURE — 87077 CULTURE AEROBIC IDENTIFY: CPT

## 2022-01-28 PROCEDURE — 85025 COMPLETE CBC W/AUTO DIFF WBC: CPT

## 2022-01-28 PROCEDURE — 74011000250 HC RX REV CODE- 250: Performed by: EMERGENCY MEDICINE

## 2022-01-28 PROCEDURE — 81001 URINALYSIS AUTO W/SCOPE: CPT

## 2022-01-28 PROCEDURE — 74011250636 HC RX REV CODE- 250/636: Performed by: STUDENT IN AN ORGANIZED HEALTH CARE EDUCATION/TRAINING PROGRAM

## 2022-01-28 PROCEDURE — 87636 SARSCOV2 & INF A&B AMP PRB: CPT

## 2022-01-28 PROCEDURE — 71045 X-RAY EXAM CHEST 1 VIEW: CPT

## 2022-01-28 PROCEDURE — 87040 BLOOD CULTURE FOR BACTERIA: CPT

## 2022-01-28 PROCEDURE — 70450 CT HEAD/BRAIN W/O DYE: CPT

## 2022-01-28 PROCEDURE — 87086 URINE CULTURE/COLONY COUNT: CPT

## 2022-01-28 PROCEDURE — 99284 EMERGENCY DEPT VISIT MOD MDM: CPT

## 2022-01-28 PROCEDURE — 36415 COLL VENOUS BLD VENIPUNCTURE: CPT

## 2022-01-28 PROCEDURE — 80053 COMPREHEN METABOLIC PANEL: CPT

## 2022-01-28 PROCEDURE — 74011250637 HC RX REV CODE- 250/637: Performed by: STUDENT IN AN ORGANIZED HEALTH CARE EDUCATION/TRAINING PROGRAM

## 2022-01-28 PROCEDURE — 87186 SC STD MICRODIL/AGAR DIL: CPT

## 2022-01-28 PROCEDURE — 82550 ASSAY OF CK (CPK): CPT

## 2022-01-28 PROCEDURE — 83605 ASSAY OF LACTIC ACID: CPT

## 2022-01-28 RX ORDER — TAMSULOSIN HYDROCHLORIDE 0.4 MG/1
0.4 CAPSULE ORAL DAILY
Status: DISCONTINUED | OUTPATIENT
Start: 2022-01-28 | End: 2022-01-31 | Stop reason: HOSPADM

## 2022-01-28 RX ORDER — ACETAMINOPHEN 325 MG/1
650 TABLET ORAL
Status: DISCONTINUED | OUTPATIENT
Start: 2022-01-28 | End: 2022-01-31 | Stop reason: HOSPADM

## 2022-01-28 RX ORDER — SODIUM CHLORIDE 0.9 % (FLUSH) 0.9 %
5-40 SYRINGE (ML) INJECTION AS NEEDED
Status: DISCONTINUED | OUTPATIENT
Start: 2022-01-28 | End: 2022-01-31 | Stop reason: HOSPADM

## 2022-01-28 RX ORDER — ENOXAPARIN SODIUM 100 MG/ML
30 INJECTION SUBCUTANEOUS DAILY
Status: DISCONTINUED | OUTPATIENT
Start: 2022-01-29 | End: 2022-01-28

## 2022-01-28 RX ORDER — POLYETHYLENE GLYCOL 3350 17 G/17G
17 POWDER, FOR SOLUTION ORAL DAILY PRN
Status: DISCONTINUED | OUTPATIENT
Start: 2022-01-28 | End: 2022-01-31 | Stop reason: HOSPADM

## 2022-01-28 RX ORDER — ONDANSETRON 4 MG/1
4 TABLET, ORALLY DISINTEGRATING ORAL
Status: DISCONTINUED | OUTPATIENT
Start: 2022-01-28 | End: 2022-01-31 | Stop reason: HOSPADM

## 2022-01-28 RX ORDER — ATORVASTATIN CALCIUM 10 MG/1
10 TABLET, FILM COATED ORAL DAILY
Status: DISCONTINUED | OUTPATIENT
Start: 2022-01-29 | End: 2022-01-31 | Stop reason: HOSPADM

## 2022-01-28 RX ORDER — HEPARIN SODIUM 5000 [USP'U]/ML
5000 INJECTION, SOLUTION INTRAVENOUS; SUBCUTANEOUS EVERY 12 HOURS
Status: DISCONTINUED | OUTPATIENT
Start: 2022-01-28 | End: 2022-01-31 | Stop reason: HOSPADM

## 2022-01-28 RX ORDER — ACETAMINOPHEN 650 MG/1
650 SUPPOSITORY RECTAL
Status: DISCONTINUED | OUTPATIENT
Start: 2022-01-28 | End: 2022-01-31 | Stop reason: HOSPADM

## 2022-01-28 RX ORDER — ONDANSETRON 2 MG/ML
4 INJECTION INTRAMUSCULAR; INTRAVENOUS
Status: DISCONTINUED | OUTPATIENT
Start: 2022-01-28 | End: 2022-01-31 | Stop reason: HOSPADM

## 2022-01-28 RX ORDER — SODIUM CHLORIDE 9 MG/ML
75 INJECTION, SOLUTION INTRAVENOUS CONTINUOUS
Status: DISCONTINUED | OUTPATIENT
Start: 2022-01-28 | End: 2022-01-29

## 2022-01-28 RX ORDER — SODIUM CHLORIDE 0.9 % (FLUSH) 0.9 %
5-40 SYRINGE (ML) INJECTION EVERY 8 HOURS
Status: DISCONTINUED | OUTPATIENT
Start: 2022-01-28 | End: 2022-01-31 | Stop reason: HOSPADM

## 2022-01-28 RX ORDER — DONEPEZIL HYDROCHLORIDE 5 MG/1
5 TABLET, FILM COATED ORAL
Status: DISCONTINUED | OUTPATIENT
Start: 2022-01-28 | End: 2022-01-31 | Stop reason: HOSPADM

## 2022-01-28 RX ORDER — RISPERIDONE 0.25 MG/1
0.5 TABLET, FILM COATED ORAL
Status: DISCONTINUED | OUTPATIENT
Start: 2022-01-28 | End: 2022-01-31 | Stop reason: HOSPADM

## 2022-01-28 RX ADMIN — SODIUM CHLORIDE 75 ML/HR: 9 INJECTION, SOLUTION INTRAVENOUS at 18:24

## 2022-01-28 RX ADMIN — SODIUM CHLORIDE 1000 ML: 9 INJECTION, SOLUTION INTRAVENOUS at 13:55

## 2022-01-28 RX ADMIN — RISPERIDONE 0.5 MG: 1 TABLET ORAL at 21:49

## 2022-01-28 RX ADMIN — CEFTRIAXONE SODIUM 1 G: 1 INJECTION, POWDER, FOR SOLUTION INTRAMUSCULAR; INTRAVENOUS at 18:23

## 2022-01-28 RX ADMIN — TAMSULOSIN HYDROCHLORIDE 0.4 MG: 0.4 CAPSULE ORAL at 21:49

## 2022-01-28 RX ADMIN — SODIUM CHLORIDE, PRESERVATIVE FREE 10 ML: 5 INJECTION INTRAVENOUS at 22:00

## 2022-01-28 RX ADMIN — HEPARIN SODIUM 5000 UNITS: 5000 INJECTION, SOLUTION INTRAVENOUS; SUBCUTANEOUS at 21:46

## 2022-01-28 RX ADMIN — SODIUM CHLORIDE, PRESERVATIVE FREE 10 ML: 5 INJECTION INTRAVENOUS at 18:24

## 2022-01-28 RX ADMIN — DONEPEZIL HYDROCHLORIDE 5 MG: 5 TABLET, FILM COATED ORAL at 21:49

## 2022-01-28 NOTE — ED TRIAGE NOTES
Wife brings patient to ED with c/o altered mental status and Incontinence of bowel and bladder. Wife states patient will \" be start doing something and then seems like he just forgets what he is doing and just sits there. Aware of age and situation in triage but not year. Also only drinks about 1 pint of water a day per wife, she is worried about possible dehydration.  States decreased urination since 1/26

## 2022-01-28 NOTE — H&P
Hospitalist Admission Note    NAME: Sridhar Tracy   :  1950   MRN:  [id number]   Room Number: PB57/05  @ [facility]     Date/Time:  2022 3:44 PM    Patient PCP: Alexandra Lieberman MD  ______________________________________________________________________  Given the patient's current clinical presentation, I have a high level of concern for decompensation if discharged from the emergency department. Complex decision making was performed, which includes reviewing the patient's available past medical records, laboratory results, and x-ray films. My assessment of this patient's clinical condition and my plan of care is as follows. Assessment / Plan: Active Problems:    Sepsis ([address] (2022)      Sepsis POA due to Urinary tract infection POA  Leukocytosis POA  UA positive for infection. CT A/P without contrast interpreted independently - no calculus, pyelonephritis, urinary obstruction.     - Continue ceftriaxone  - Follow up urine Cx  - Check CK and lactic acid   - IV hydration    Altered mental status POA  Dementia POA  Suspect due to sepsis and ZEN. Underlying dementia. CT Head interpreted independently - no acute ischemia, hemorrhage or mass. Chronic encephal    - resume risperidone and donepezil.  - Frequent reorientation        ZEN POA on CKD POA   Baseline creatinine 1.2-1.3. Current BUN/Cr 34/1. 84. CT A/P showed no obstruction    - Strict Is and Os,avoid nephrotoxic meds, renally dose meds  - IV fluids         Tachycardia POA   noted on admission. Suspect due to sepsis and hypovolemia. - Repeat EKG  - Fluid repletion       Amputation of bilateral lower extremities POA      HTN POA  HLD POA  - resume home meds       Body mass index is 15.33 kg/m².    Malnutrition, severe, protein calorie    - RD consult     Code Status: full   Surrogate Decision Maker:  Wife     DVT Prophylaxis: Lovenox  GI Prophylaxis: not indicated  Baseline: ambulatory independently         Subjective:   CHIEF COMPLAINT: confusion    HISTORY OF PRESENT ILLNESS:     Lacho More is a 80 y.o.  male with PMH of dementia, HTN, HLD who presents to ED with c/o confusion. Patient's wife Neida Lisa provides most of the history. For the past 3 days patient has been increasingly confused, sleeping all day, intermittent agitation, with poor appetite, decreased oral intake, urinary urgency, urinary incontinence. Patient was taken off risperidone 3 weeks ago but on  after an episode of agitation, wife restarted it. At baseline, patient is oriented to AOx3, requires minimal assistance in ADLs,ambulates with wheelchair. We were asked to admit for work up and evaluation of the above problems.      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 ([facility] 2019    md curly    Dyslipidemia     Gangrene ([facility]     bilateral amputation d/t gangrene    Hallucination     Hernia, inguinal, left 2019    Hypertension     Inferior vena cava embolism ([facility] 2011    Pancreatic mass     Prediabetes     Renal failure     Dr Rosita Mcdonald S/P colonoscopy 08    ryan repeat 5 yrs    UTI (urinary tract infection)     Wound, open         Past Surgical History:   Procedure Laterality Date    COLONOSCOPY N/A 2018    COLONOSCOPY performed by Verner Chapel, MD at [address] [place] [address]      bilateral amputation    HX UROLOGICAL      left orchiectomy    NEUROLOGICAL PROCEDURE UNLISTED  2004    aneursym    NE ABDOMEN SURGERY PROC UNLISTED      colonoscopy    NE CARDIAC SURG PROCEDURE UNLIST         Social History     Tobacco Use    Smoking status: Former Smoker     Quit date: 2000     Years since quittin.4    Smokeless tobacco: Never Used   Substance Use Topics    Alcohol use: Yes     Comment: ocassional, 3 wine coolers / year        Family History   Problem Relation Age of Onset    No Known Problems Father      No Known Allergies     Prior to Admission medications    Medication Sig Start Date End Date Taking? Authorizing Provider   atorvastatin (LIPITOR) 10 mg tablet Take 1 Tablet by mouth daily. 9/13/21   Adriane Horta MD   risperiDONE (RisperDAL) 0.5 mg tablet Take 1 Tablet by mouth nightly. 6/30/21   Adriane Horta MD   donepeziL (ARICEPT) 5 mg tablet Take 1 Tablet by mouth nightly. 6/30/21   Luis Fernando Lopez MD   tamsulosin (FLOMAX) 0.4 mg capsule TAKE 1 CAPSULE BY MOUTH EVERY DAY 4/28/21   Luis Fernando Lopez MD       REVIEW OF SYSTEMS:     I am not able to complete the review of systems because:    The patient is intubated and sedated    The patient has altered mental status due to his acute medical problems    The patient has baseline aphasia from prior stroke(s)    The patient has baseline dementia and is not reliable historian    The patient is in acute medical distress and unable to provide information           Total of 12 systems reviewed as follows:       POSITIVE= underlined text  Negative = text not underlined  General:  fever, chills, sweats, generalized weakness, weight loss/gain,      loss of appetite   Eyes:    blurred vision, eye pain, loss of vision, double vision  ENT:    rhinorrhea, pharyngitis   Respiratory:   cough, sputum production, SOB, DUNN, wheezing, pleuritic pain   Cardiology:   chest pain, palpitations, orthopnea, PND, edema, syncope   Gastrointestinal:  abdominal pain , N/V, diarrhea, dysphagia, constipation, bleeding   Genitourinary:  frequency, urgency, dysuria, hematuria, incontinence   Muskuloskeletal :  arthralgia, myalgia, back pain  Hematology:  easy bruising, nose or gum bleeding, lymphadenopathy   Dermatological: rash, ulceration, pruritis, color change / jaundice  Endocrine:   hot flashes or polydipsia   Neurological:  headache, dizziness, confusion, focal weakness, paresthesia,     Speech difficulties, memory loss, gait difficulty  Psychological: Feelings of anxiety, depression, agitation    Objective:   VITALS:    Visit Vitals  /82   Pulse (!) 106   Temp 98.7 °F (37.1 °C)   Resp 20   Wt 43.1 kg (95 lb)   SpO2 100%   BMI 15.33 kg/m²       PHYSICAL EXAM:    General:    Alert, cooperative, no distress, appears stated age. HEENT: Atraumatic, anicteric sclerae, pink conjunctivae     No oral ulcers, mucosa moist, throat clear, dentition fair  Neck:  Supple, symmetrical,  thyroid: non tender  Lungs:   Clear to auscultation bilaterally. No Wheezing or Rhonchi. No rales. Chest wall:  No tenderness  No Accessory muscle use. Heart:   Regular  rhythm,  No  murmur   No edema  Abdomen:   Soft, non-tender. Not distended. Bowel sounds normal  Extremities: No cyanosis. No clubbing,      Skin turgor normal, Capillary refill normal, Radial dial pulse 2+  Skin:     Not pale. Not Jaundiced  No rashes   Psych:  poor insight. Not depressed. Not anxious or agitated. Neurologic: EOMs intact. No facial asymmetry. No aphasia or slurred speech. Symmetrical strength, Sensation grossly intact.  Alert and oriented X self and place.     ______________________________________________________________________    Care Plan discussed with:  Patient/Family, Nurse and     Expected  Disposition:  Home w/Family  ________________________________________________________________________  TOTAL TIME:  39 Minutes    Critical Care Provided     Minutes non procedure based      Comments    x Reviewed previous records   >50% of visit spent in counseling and coordination of care x Discussion with patient and/or family and questions answered       ________________________________________________________________________  Signed: Phylliss Locs, MD    Procedures: see electronic medical records for all procedures/Xrays and details which were not copied into this note but were reviewed prior to creation of Plan. LAB DATA REVIEWED:    Recent Results (from the past 24 hour(s))   CBC WITH AUTOMATED DIFF    Collection Time: 01/28/22  1:08 PM   Result Value Ref Range    WBC 22.0 (HH) 4.1 - 11.1 K/uL    RBC 5.35 4.10 - 5.70 M/uL    HGB 13.3 12.1 - 17.0 g/dL    HCT 38.6 36.6 - 50.3 %    MCV 72.1 (L) 80.0 - 99.0 FL    MCH 24.9 (L) 26.0 - 34.0 PG    MCHC 34.5 30.0 - 36.5 g/dL    RDW 14.6 (H) 11.5 - 14.5 %    PLATELET 976 (L) 243 - 400 K/uL    MPV 11.7 8.9 - 12.9 FL    NRBC 0.0 0  WBC    ABSOLUTE NRBC 0.00 0.00 - 0.01 K/uL    NEUTROPHILS 80 (H) 32 - 75 %    LYMPHOCYTES 9 (L) 12 - 49 %    MONOCYTES 9 5 - 13 %    EOSINOPHILS 0 0 - 7 %    BASOPHILS 0 0 - 1 %    IMMATURE GRANULOCYTES 1 (H) 0.0 - 0.5 %    ABS. NEUTROPHILS 17.7 (H) 1.8 - 8.0 K/UL    ABS. LYMPHOCYTES 2.0 0.8 - 3.5 K/UL    ABS. MONOCYTES 2.0 (H) 0.0 - 1.0 K/UL    ABS. EOSINOPHILS 0.0 0.0 - 0.4 K/UL    ABS. BASOPHILS 0.1 0.0 - 0.1 K/UL    ABS. IMM. GRANS. 0.2 (H) 0.00 - 0.04 K/UL    DF AUTOMATED     METABOLIC PANEL, COMPREHENSIVE    Collection Time: 01/28/22  1:08 PM   Result Value Ref Range    Sodium 133 (L) 136 - 145 mmol/L    Potassium 3.8 3.5 - 5.1 mmol/L    Chloride 100 97 - 108 mmol/L    CO2 29 21 - 32 mmol/L    Anion gap 4 (L) 5 - 15 mmol/L    Glucose 122 (H) 65 - 100 mg/dL    BUN 34 (H) 6 - 20 MG/DL    Creatinine 1.84 (H) 0.70 - 1.30 MG/DL    BUN/Creatinine ratio 18 12 - 20      GFR est AA 44 (L) >60 ml/min/1.73m2    GFR est non-AA 37 (L) >60 ml/min/1.73m2    Calcium 9.9 8.5 - 10.1 MG/DL    Bilirubin, total 1.0 0.2 - 1.0 MG/DL    ALT (SGPT) 17 12 - 78 U/L    AST (SGOT) 27 15 - 37 U/L    Alk.  phosphatase 109 45 - 117 U/L    Protein, total 8.6 (H) 6.4 - 8.2 g/dL    Albumin 3.2 (L) 3.5 - 5.0 g/dL    Globulin 5.4 (H) 2.0 - 4.0 g/dL    A-G Ratio 0.6 (L) 1.1 - 2.2     COVID-19 WITH INFLUENZA A/B    Collection Time: 01/28/22  2:21 PM Result Value Ref Range    SARS-CoV-2 Not detected NOTD      Influenza A by PCR Not detected      Influenza B by PCR Not detected     URINALYSIS W/ REFLEX CULTURE    Collection Time: 01/28/22  2:22 PM    Specimen: Miscellaneous sample; Urine    Urine specimen   Result Value Ref Range    Color DARK YELLOW      Appearance CLOUDY (A) CLEAR      Specific gravity 1.020 1.003 - 1.030      pH (UA) 5.5 5.0 - 8.0      Protein 100 (A) NEG mg/dL    Glucose Negative NEG mg/dL    Ketone Negative NEG mg/dL    Blood SMALL (A) NEG      Urobilinogen 1.0 0.2 - 1.0 EU/dL    Nitrites Positive (A) NEG      Leukocyte Esterase MODERATE (A) NEG      WBC 10-20 0 - 4 /hpf    RBC 0-5 0 - 5 /hpf    Epithelial cells FEW FEW /lpf    Bacteria 2+ (A) NEG /hpf    UA:UC IF INDICATED URINE CULTURE ORDERED (A) CNI     BILIRUBIN, CONFIRM    Collection Time: 01/28/22  2:22 PM   Result Value Ref Range    Bilirubin UA, confirm Negative NEG

## 2022-01-28 NOTE — ED NOTES
Patient here with c/o altered mental status and urinary urgency. Patient wife states symptoms started several days ago. States patient began to have urinary frequency and is continent at baseline but began to be incontinent of urine. Denies fevers. Wife then states patient fluid intake decreased, states his frequency ended but now still has urgency. Wife states yesterday he was incontinent of stool. Patient hx of bilateral leg amputations, hx of scoliosis. Patient reportedly forgetful and decreased verbal interaction with AMS. Emergency Department Nursing Plan of Care       The Nursing Plan of Care is developed from the Nursing assessment and Emergency Department Attending provider initial evaluation. The plan of care may be reviewed in the ED Provider note.     The Plan of Care was developed with the following considerations:   Patient / Family readiness to learn indicated by:verbalized understanding  Persons(s) to be included in education: patient  Barriers to Learning/Limitations:No    Signed     Norman Agarwal RN    1/28/2022   3:49 PM

## 2022-01-28 NOTE — ED PROVIDER NOTES
EMERGENCY DEPARTMENT HISTORY AND PHYSICAL EXAM      Date: 1/28/2022  Patient Name: Kody Duncan    History of Presenting Illness     Chief Complaint   Patient presents with    Altered mental status    Incontinence       History Provided By: Patient and Patient's Wife    HPI: Kody Duncan, 80 y.o. 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 intake. Wife reports of her last 2 days he has not been eating or drinking very much and has seemed more confused than normal. Additionally has not been eating or drinking much. For the last several months patient is also had a baseline tremor where he rocks back and forth but this is unchanged. Wife reports that his PCP took him off the Risperdal as they thought this might be contributing but it has not changed anything. Patient himself denies any complaints. He is alert and oriented to self and place but confused to details. PCP: Eryn Fernandez MD    There are no other complaints, changes, or physical findings at this time.     Current Facility-Administered Medications   Medication Dose Route Frequency Provider Last Rate Last Admin    cefTRIAXone (ROCEPHIN) 1 g in sterile water (preservative free) 10 mL IV syringe  1 g IntraVENous NOW Roshni Braga MD        sodium chloride (NS) flush 5-40 mL  5-40 mL IntraVENous Q8H Kirsty Luz MD        sodium chloride (NS) flush 5-40 mL  5-40 mL IntraVENous PRN Kirsty Luz MD        acetaminophen (TYLENOL) tablet 650 mg  650 mg Oral Q6H PRN Kirsty Luz MD        Or   Gelacio Johnson acetaminophen (TYLENOL) suppository 650 mg  650 mg Rectal Q6H PRN Kirsty Luz MD        polyethylene glycol (MIRALAX) packet 17 g  17 g Oral DAILY PRN Kirsty Luz MD        ondansetron (ZOFRAN ODT) tablet 4 mg  4 mg Oral Q8H PRN Kirsty Luz MD        Or   Gelacio Johnson ondansetron (ZOFRAN) injection 4 mg  4 mg IntraVENous Q6H PRN MD Sanjeev Garcia [START ON 1/29/2022] enoxaparin (LOVENOX) injection 30 mg  30 mg SubCUTAneous DAILY Luzm Aria Cheryle, MD       Duke Hedger Alveda Jaja ON 1/29/2022] atorvastatin (LIPITOR) tablet 10 mg  10 mg Oral DAILY Luz Maria Lobato MD        donepeziL (ARICEPT) tablet 5 mg  5 mg Oral QHS Luz Maria Lobato MD        risperiDONE (RisperDAL) tablet 0.5 mg  0.5 mg Oral QHS Luz Maria Cheryle, MD       Duke Hedger Alveda Jaja ON 1/29/2022] tamsulosin (FLOMAX) capsule 0.4 mg  0.4 mg Oral DAILY Luz Maria Lobato MD         Current Outpatient Medications   Medication Sig Dispense Refill    atorvastatin (LIPITOR) 10 mg tablet Take 1 Tablet by mouth daily. 30 Tablet 11    risperiDONE (RisperDAL) 0.5 mg tablet Take 1 Tablet by mouth nightly. 30 Tablet 11    donepeziL (ARICEPT) 5 mg tablet Take 1 Tablet by mouth nightly.  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 ([address] 04/19/2019    md curly    Dyslipidemia     Gangrene ([address] 2011    bilateral amputation d/t gangrene    Hallucination     Hernia, inguinal, left 04/01/2019    Hypertension     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    Pancreatic mass     Prediabetes     Renal failure     Dr Hina Crump S/P colonoscopy 1-4-08 2/26/19    ryan repeat 5 yrs    UTI (urinary tract infection)     Wound, open      Past Surgical History:  Past Surgical History:   Procedure Laterality Date    COLONOSCOPY N/A 8/7/2018    COLONOSCOPY performed by Rhoda Jordan MD at [address] [place] [address]      bilateral amputation    HX UROLOGICAL      left orchiectomy  NEUROLOGICAL PROCEDURE UNLISTED  2004    aneursym    [place] ABDOMEN SURGERY PROC UNLISTED      colonoscopy    [place] CARDIAC SURG PROCEDURE UNLIST       Family History:  Family History   Problem Relation Age of Onset    No Known Problems Father      Social History:  Social History     Tobacco Use    Smoking status: Former Smoker     Quit date: 2000     Years since quittin.4    Smokeless tobacco: Never Used   Vaping Use    Vaping Use: Never used   Substance Use Topics    Alcohol use: Yes     Comment: ocassional, 3 wine coolers / year    Drug use: No     Allergies:  No Known Allergies  Review of Systems   Review of Systems   Constitutional: Positive for appetite change. Negative for chills and fever. HENT: Negative for congestion, rhinorrhea and sore throat. Respiratory: Negative for cough and shortness of breath. Cardiovascular: Negative for chest pain. Gastrointestinal: Negative for abdominal pain, nausea and vomiting. Genitourinary: Negative for dysuria and urgency. Incontinence   Skin: Negative for rash. Neurological: Negative for dizziness, light-headedness and headaches. Psychiatric/Behavioral: Positive for confusion. All other systems reviewed and are negative. Physical Exam   Physical Exam  Vitals and nursing note reviewed. Constitutional:       General: He is not in acute distress. Appearance: He is well-developed. Comments: Constant rocking motions  Chronically ill-appearing   HENT:      Head: Normocephalic and atraumatic. Eyes:      Conjunctiva/sclera: Conjunctivae normal.      Pupils: Pupils are equal, round, and reactive to light. Cardiovascular:      Rate and Rhythm: Normal rate and regular rhythm. Pulmonary:      Effort: Pulmonary effort is normal. No respiratory distress. Breath sounds: Normal breath sounds. No stridor. Abdominal:      General: There is no distension. Palpations: Abdomen is soft. Tenderness:  There is no abdominal tenderness. Musculoskeletal:         General: Normal range of motion. Cervical back: Normal range of motion. Comments: IVANA BUCKNER   Skin:     General: Skin is warm and dry. Neurological:      Mental Status: He is alert. Comments: Oriented to person/birthday/place, not oriented to time       Diagnostic Study Results   Labs -     Recent Results (from the past 12 hour(s))   CBC WITH AUTOMATED DIFF    Collection Time: 01/28/22  1:08 PM   Result Value Ref Range    WBC 22.0 (HH) 4.1 - 11.1 K/uL    RBC 5.35 4.10 - 5.70 M/uL    HGB 13.3 12.1 - 17.0 g/dL    HCT 38.6 36.6 - 50.3 %    MCV 72.1 (L) 80.0 - 99.0 FL    MCH 24.9 (L) 26.0 - 34.0 PG    MCHC 34.5 30.0 - 36.5 g/dL    RDW 14.6 (H) 11.5 - 14.5 %    PLATELET 295 (L) 931 - 400 K/uL    MPV 11.7 8.9 - 12.9 FL    NRBC 0.0 0  WBC    ABSOLUTE NRBC 0.00 0.00 - 0.01 K/uL    NEUTROPHILS 80 (H) 32 - 75 %    LYMPHOCYTES 9 (L) 12 - 49 %    MONOCYTES 9 5 - 13 %    EOSINOPHILS 0 0 - 7 %    BASOPHILS 0 0 - 1 %    IMMATURE GRANULOCYTES 1 (H) 0.0 - 0.5 %    ABS. NEUTROPHILS 17.7 (H) 1.8 - 8.0 K/UL    ABS. LYMPHOCYTES 2.0 0.8 - 3.5 K/UL    ABS. MONOCYTES 2.0 (H) 0.0 - 1.0 K/UL    ABS. EOSINOPHILS 0.0 0.0 - 0.4 K/UL    ABS. BASOPHILS 0.1 0.0 - 0.1 K/UL    ABS. IMM. GRANS. 0.2 (H) 0.00 - 0.04 K/UL    DF AUTOMATED     METABOLIC PANEL, COMPREHENSIVE    Collection Time: 01/28/22  1:08 PM   Result Value Ref Range    Sodium 133 (L) 136 - 145 mmol/L    Potassium 3.8 3.5 - 5.1 mmol/L    Chloride 100 97 - 108 mmol/L    CO2 29 21 - 32 mmol/L    Anion gap 4 (L) 5 - 15 mmol/L    Glucose 122 (H) 65 - 100 mg/dL    BUN 34 (H) 6 - 20 MG/DL    Creatinine 1.84 (H) 0.70 - 1.30 MG/DL    BUN/Creatinine ratio 18 12 - 20      GFR est AA 44 (L) >60 ml/min/1.73m2    GFR est non-AA 37 (L) >60 ml/min/1.73m2    Calcium 9.9 8.5 - 10.1 MG/DL    Bilirubin, total 1.0 0.2 - 1.0 MG/DL    ALT (SGPT) 17 12 - 78 U/L    AST (SGOT) 27 15 - 37 U/L    Alk.  phosphatase 109 45 - 117 U/L Protein, total 8.6 (H) 6.4 - 8.2 g/dL    Albumin 3.2 (L) 3.5 - 5.0 g/dL    Globulin 5.4 (H) 2.0 - 4.0 g/dL    A-G Ratio 0.6 (L) 1.1 - 2.2     COVID-19 WITH INFLUENZA A/B    Collection Time: 01/28/22  2:21 PM   Result Value Ref Range    SARS-CoV-2 Not detected NOTD      Influenza A by PCR Not detected      Influenza B by PCR Not detected     URINALYSIS W/ REFLEX CULTURE    Collection Time: 01/28/22  2:22 PM    Specimen: Miscellaneous sample; Urine    Urine specimen   Result Value Ref Range    Color DARK YELLOW      Appearance CLOUDY (A) CLEAR      Specific gravity 1.020 1.003 - 1.030      pH (UA) 5.5 5.0 - 8.0      Protein 100 (A) NEG mg/dL    Glucose Negative NEG mg/dL    Ketone Negative NEG mg/dL    Blood SMALL (A) NEG      Urobilinogen 1.0 0.2 - 1.0 EU/dL    Nitrites Positive (A) NEG      Leukocyte Esterase MODERATE (A) NEG      WBC 10-20 0 - 4 /hpf    RBC 0-5 0 - 5 /hpf    Epithelial cells FEW FEW /lpf    Bacteria 2+ (A) NEG /hpf    UA:UC IF INDICATED URINE CULTURE ORDERED (A) CNI     BILIRUBIN, CONFIRM    Collection Time: 01/28/22  2:22 PM   Result Value Ref Range    Bilirubin UA, confirm Negative NEG         Radiologic Studies -   XR CHEST PORT   Final Result   Right basilar atelectasis and probable small right pleural effusion. Severe dextroconvex thoracic spine scoliosis. CT HEAD WO CONT   Final Result   No acute intracranial abnormality. Chronic encephalomalacia and postsurgical   changes. CT ABD PELV WO CONT    (Results Pending)     CT HEAD WO CONT    Result Date: 1/28/2022  No acute intracranial abnormality. Chronic encephalomalacia and postsurgical changes. XR CHEST PORT    Result Date: 1/28/2022  Right basilar atelectasis and probable small right pleural effusion. Severe dextroconvex thoracic spine scoliosis. Medical Decision Making   I am the first provider for this patient.     I reviewed the vital signs, available nursing notes, past medical history, past surgical history, family history and social history. Vital Signs-Reviewed the patient's vital signs. Patient Vitals for the past 12 hrs:   Temp Pulse Resp BP SpO2   01/28/22 1515  97   100 %   01/28/22 1500    126/82    01/28/22 1434  100   99 %   01/28/22 1411     99 %   01/28/22 1410  99   99 %   01/28/22 1400    127/74    01/28/22 1359    120/80    01/28/22 1209 98.7 °F (37.1 °C) (!) 106 20 106/63 100 %       Pulse Oximetry Analysis - 100% on ra      Records Reviewed: Nursing Notes and Old Medical Records    Provider Notes (Medical Decision Making):   Patient presents with concerns for decreased p.o. intake, increased confusion and some incontinence at home. ANO x2 on my evaluation, confused to details. Hemodynamically stable, afebrile. Concern for dehydration versus UTI versus pneumonia, though patient has no respiratory symptoms so I think this is less likely. Will check basic lab work, chest x-ray, urinalysis, Covid swab, CT head. ED Course:   Initial assessment performed. The patients presenting problems have been discussed, and they are in agreement with the care plan formulated and outlined with them. I have encouraged them to ask questions as they arise throughout their visit. ED Course as of 01/28/22 1600   Fri Jan 28, 2022   1501 UA with evidence of UTI. Started on abx. Discussed with hospitalist for admission [DENY]      ED Course User Index  Avril Macias MD       Procedures:  Procedures    Critical Care:  none    Disposition:    Admission Note:  Patient is being admitted to the hospital by Dr. Mello Mahmood, Service: Hospitalist.  The results of their tests and reasons for their admission have been discussed with them and available family. They convey agreement and understanding for the need to be admitted and for their admission diagnosis. Diagnosis     Clinical Impression:   1. Acute UTI    2.  Sepsis, due to unspecified organism, unspecified whether acute organ dysfunction present [facility])            Please note that this dictation was completed with DoubleMap, the computer voice recognition software. Quite often unanticipated grammatical, syntax, homophones, and other interpretive errors are inadvertently transcribed by the computer software. Please disregard these errors.   Please excuse any errors that have escaped final proofreading

## 2022-01-28 NOTE — ED NOTES
TRANSFER - OUT REPORT:    Dc Martin report given to [facility] RN (name) on Jazmine Baker  being transferred to [place] [address] (unit) for routine progression of care       Report consisted of patients Situation, Background, Assessment and   Recommendations(SBAR). Information from the following report(s) SBAR and ED Summary was reviewed with the receiving nurse. Lines:   Peripheral IV 01/28/22 Right;Posterior Antecubital (Active)   Site Assessment Clean, dry, & intact 01/28/22 1310   Phlebitis Assessment 0 01/28/22 1310   Infiltration Assessment 0 01/28/22 1310   Dressing Status Clean, dry, & intact 01/28/22 1310   Hub Color/Line Status Pink 01/28/22 1310   Action Taken Blood drawn 01/28/22 1310        Opportunity for questions and clarification was provided.       Patient transported with:   Registered Nurse

## 2022-01-29 LAB
ALBUMIN SERPL-MCNC: 2.4 G/DL (ref 3.5–5)
ALBUMIN/GLOB SERPL: 0.5 {RATIO} (ref 1.1–2.2)
ALP SERPL-CCNC: 89 U/L (ref 45–117)
ALT SERPL-CCNC: 17 U/L (ref 12–78)
ANION GAP SERPL CALC-SCNC: 5 MMOL/L (ref 5–15)
AST SERPL-CCNC: 28 U/L (ref 15–37)
BASOPHILS # BLD: 0.1 K/UL (ref 0–0.1)
BASOPHILS NFR BLD: 1 % (ref 0–1)
BILIRUB DIRECT SERPL-MCNC: 0.2 MG/DL (ref 0–0.2)
BILIRUB SERPL-MCNC: 0.6 MG/DL (ref 0.2–1)
BUN SERPL-MCNC: 24 MG/DL (ref 6–20)
BUN/CREAT SERPL: 21 (ref 12–20)
CALCIUM SERPL-MCNC: 8.7 MG/DL (ref 8.5–10.1)
CHLORIDE SERPL-SCNC: 105 MMOL/L (ref 97–108)
CO2 SERPL-SCNC: 25 MMOL/L (ref 21–32)
CREAT SERPL-MCNC: 1.15 MG/DL (ref 0.7–1.3)
DIFFERENTIAL METHOD BLD: ABNORMAL
EOSINOPHIL # BLD: 0.3 K/UL (ref 0–0.4)
EOSINOPHIL NFR BLD: 2 % (ref 0–7)
ERYTHROCYTE [DISTWIDTH] IN BLOOD BY AUTOMATED COUNT: 14.6 % (ref 11.5–14.5)
GLOBULIN SER CALC-MCNC: 4.6 G/DL (ref 2–4)
GLUCOSE SERPL-MCNC: 107 MG/DL (ref 65–100)
HCT VFR BLD AUTO: 30.4 % (ref 36.6–50.3)
HGB BLD-MCNC: 10.7 G/DL (ref 12.1–17)
IMM GRANULOCYTES # BLD AUTO: 0.1 K/UL (ref 0–0.04)
IMM GRANULOCYTES NFR BLD AUTO: 1 % (ref 0–0.5)
LYMPHOCYTES # BLD: 2 K/UL (ref 0.8–3.5)
LYMPHOCYTES NFR BLD: 14 % (ref 12–49)
MAGNESIUM SERPL-MCNC: 1.8 MG/DL (ref 1.6–2.4)
MCH RBC QN AUTO: 25.2 PG (ref 26–34)
MCHC RBC AUTO-ENTMCNC: 35.2 G/DL (ref 30–36.5)
MCV RBC AUTO: 71.7 FL (ref 80–99)
MONOCYTES # BLD: 1 K/UL (ref 0–1)
MONOCYTES NFR BLD: 7 % (ref 5–13)
NEUTS SEG # BLD: 11 K/UL (ref 1.8–8)
NEUTS SEG NFR BLD: 76 % (ref 32–75)
NRBC # BLD: 0 K/UL (ref 0–0.01)
NRBC BLD-RTO: 0 PER 100 WBC
PHOSPHATE SERPL-MCNC: 2.5 MG/DL (ref 2.6–4.7)
PLATELET # BLD AUTO: 113 K/UL (ref 150–400)
PMV BLD AUTO: 11.5 FL (ref 8.9–12.9)
POTASSIUM SERPL-SCNC: 3.7 MMOL/L (ref 3.5–5.1)
PROT SERPL-MCNC: 7 G/DL (ref 6.4–8.2)
RBC # BLD AUTO: 4.24 M/UL (ref 4.1–5.7)
SODIUM SERPL-SCNC: 135 MMOL/L (ref 136–145)
WBC # BLD AUTO: 14.4 K/UL (ref 4.1–11.1)

## 2022-01-29 PROCEDURE — 65270000032 HC RM SEMIPRIVATE

## 2022-01-29 PROCEDURE — 80076 HEPATIC FUNCTION PANEL: CPT

## 2022-01-29 PROCEDURE — 80048 BASIC METABOLIC PNL TOTAL CA: CPT

## 2022-01-29 PROCEDURE — 83735 ASSAY OF MAGNESIUM: CPT

## 2022-01-29 PROCEDURE — 85025 COMPLETE CBC W/AUTO DIFF WBC: CPT

## 2022-01-29 PROCEDURE — 74011250637 HC RX REV CODE- 250/637: Performed by: STUDENT IN AN ORGANIZED HEALTH CARE EDUCATION/TRAINING PROGRAM

## 2022-01-29 PROCEDURE — 36415 COLL VENOUS BLD VENIPUNCTURE: CPT

## 2022-01-29 PROCEDURE — 74011250637 HC RX REV CODE- 250/637: Performed by: HOSPITALIST

## 2022-01-29 PROCEDURE — 84100 ASSAY OF PHOSPHORUS: CPT

## 2022-01-29 PROCEDURE — 74011000250 HC RX REV CODE- 250: Performed by: STUDENT IN AN ORGANIZED HEALTH CARE EDUCATION/TRAINING PROGRAM

## 2022-01-29 PROCEDURE — 74011000258 HC RX REV CODE- 258: Performed by: STUDENT IN AN ORGANIZED HEALTH CARE EDUCATION/TRAINING PROGRAM

## 2022-01-29 PROCEDURE — 74011250636 HC RX REV CODE- 250/636: Performed by: STUDENT IN AN ORGANIZED HEALTH CARE EDUCATION/TRAINING PROGRAM

## 2022-01-29 RX ORDER — QUETIAPINE FUMARATE 25 MG/1
12.5 TABLET, FILM COATED ORAL ONCE
Status: ACTIVE | OUTPATIENT
Start: 2022-01-29 | End: 2022-01-30

## 2022-01-29 RX ADMIN — SODIUM CHLORIDE, PRESERVATIVE FREE 10 ML: 5 INJECTION INTRAVENOUS at 05:17

## 2022-01-29 RX ADMIN — SODIUM CHLORIDE, PRESERVATIVE FREE 10 ML: 5 INJECTION INTRAVENOUS at 12:55

## 2022-01-29 RX ADMIN — RISPERIDONE 0.5 MG: 1 TABLET ORAL at 21:45

## 2022-01-29 RX ADMIN — TAMSULOSIN HYDROCHLORIDE 0.4 MG: 0.4 CAPSULE ORAL at 21:45

## 2022-01-29 RX ADMIN — SODIUM CHLORIDE, PRESERVATIVE FREE 10 ML: 5 INJECTION INTRAVENOUS at 09:00

## 2022-01-29 RX ADMIN — CEFTRIAXONE SODIUM 1 G: 1 INJECTION, POWDER, FOR SOLUTION INTRAMUSCULAR; INTRAVENOUS at 12:55

## 2022-01-29 RX ADMIN — HEPARIN SODIUM 5000 UNITS: 5000 INJECTION, SOLUTION INTRAVENOUS; SUBCUTANEOUS at 09:02

## 2022-01-29 RX ADMIN — DONEPEZIL HYDROCHLORIDE 5 MG: 5 TABLET, FILM COATED ORAL at 21:46

## 2022-01-29 RX ADMIN — ATORVASTATIN CALCIUM 10 MG: 10 TABLET, FILM COATED ORAL at 09:02

## 2022-01-29 RX ADMIN — HEPARIN SODIUM 5000 UNITS: 5000 INJECTION, SOLUTION INTRAVENOUS; SUBCUTANEOUS at 21:46

## 2022-01-29 RX ADMIN — SODIUM CHLORIDE 75 ML/HR: 9 INJECTION, SOLUTION INTRAVENOUS at 09:02

## 2022-01-29 NOTE — PROGRESS NOTES
0720  Shift change report received from [facility]. Report included review of SBAR, accordion report, results review, orders, meds, ROS, and POC. All questions answered. Transfer of care complete. 5271  Am shift assessment and VS completed, MEWS=0, pain 0/10. Pt A&O to self and place but not time or situation, pleasant demeanor. D/w pt reason for admit to hospital, diagnosis and POC, pt verbalized understanding. Reviewed with pt safety measures, bed alarm set, call bell and phone in place. 2655  Am scheduled meds admin and serum labs collected. 1000  Serum lab results reviewed, WBC down to 14.4 (from 22.0); kidney function labs improved. 1105  Interdisciplinary rounds completed; monitor and encourage po fluids and food., d/c IVF at lunch if good po intake, plan for d/c to home tomorrow (Sunday 1/30/22). 1205  Q4H VS repeated, tele strip read and charted as NSR. Lunch tray delivered. 1255  scheduled IVAB Rocephin admin, Pt still eating from lunch tray. Tele box leads reattached, pt immediately removed after reminder to leave leads on her chest, pt requiring reorientation. MD notified, verbal order given to this RN from Dr. Savana Washington to d/c tele monitor. 1310  Pt in bed, resting quietly, call bell and phone in reach. Safety rounds completed. 1450  Pt in bed, watching tv, denies any needs at this time. Pt noted to be picking at his IV access dressing, pt reminded to leave IV access in place. Site covered with elastic bandage. Safety rounds completed. 1545  Pt pulled out IV access. MD called to discuss d/c of IVF, no answer at office phone, will attempt call back. Pt reminded to not pull at lines and wires for safety, pt transiently disoriented and requires freq reorienting. 1600  CHG bath and hygiene care completed. See Flow sheets for details. 1640  Q4H vs and reassessment completed. Pt still without IV access at this time, po fluids encouraged.   D/t pt's disorientation and need for frequent redirecting pt asked by this RN if he would be ok if wer put a video monitor in his room to ensure he stays safe whiel in the room alone. Pt agreeable. Video monitor set up by Tawana Jiménez while this RN in room. Safety measures reviewed with pt, call bell and phone in reach, bed alarm set, video monitor position confirmed. [address] [facility] delivered, pt denies any additional needs at this time. Pt gave verbal ok for this RN to call SO back and provide updates. This RN to get in contact with MD first then will call pt's wife. [phone]  Pt refusing to eat dinner but wants to save tray. Pt with episode of incontinence and stool smear, hygiene care completed, see  flowsheets for details. 1915  Shift change report given to [facility], [address]. Report included review of SBAR, accordion report, results review, orders, meds, ROS, and POC. Chelle to start IV access. All questions answered. Transfer of care complete.

## 2022-01-29 NOTE — PROGRESS NOTES
[facility] Medication Reconciliation    Information obtained from:  Porsha Anderson, [phone], RX Query  RxQuery data available1: yes    Comments/recommendations:    1) Patient's wife, Chacha Smith, provided medication history via phone interview [phone]    2) Medication changes to PTA list:    Added  none  Removed  none  Adjusted  Tamsulosin 0.4 mg po daily (at night)    3) The [place] Prescription Monitoring Program () was accessed to determine fill history of any controlled medications:  No record of controlled medications dispensed in past 2 years       1RxQuery pharmacy benefit data reflects medications filled and processed through the patient's insurance, however                this data does NOT capture whether the medication was picked up or is currently being taken by the patient. 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   atorvastatin (LIPITOR) 10 mg tablet 1/28/2022 at Unknown time Family Member  Yes   Sig: Take 1 Tablet by mouth daily. donepeziL (ARICEPT) 5 mg tablet 1/27/2022 at Unknown time Family Member  Yes   Sig: Take 1 Tablet by mouth nightly.    risperiDONE (RisperDAL) 0.5 mg tablet 1/27/2022 at Unknown time Family Member  Yes   Sig: Take 1 Tablet by mouth nightly.    tamsulosin (FLOMAX) 0.4 mg capsule 1/27/2022 at Unknown time Family Member  Yes   Sig: TAKE 1 CAPSULE BY MOUTH EVERY DAY (nightly)                Thank you,  Barbi Body, [facility]

## 2022-01-29 NOTE — PROGRESS NOTES
1615:Patient arrived to unit via stretcher at this time with RN from ER. Dual skin done with Margarito Espinoza, no pressure injuries at this time but healed coccyx pressure injury noted and wife states he had that prior to their marriage a long time ago. [id number]: Wife accompanied patient from ER. Patient had to use the commode and with two assist under are lift , able to transfer to commode and then to bed. MD in to room to assess patient and answer wife's questions. Wife explained that patient is normally independent with all ADLs at home and will transfer self to commode and wheelchair and dresses self and takes care of all needs. Wife left room to go home at [phone]. Patient is alert to self and place but is forgetful and is not aware of why he is in the hospital.     IV infiltrated when flushing from ER, removed with DSD from right inner AC. After two attempts , new IV placed in RFA. Several attempts to get bloodwork due to poor venous access at this time. Patient with Sign of dehydration, ashy dry skin, tenting and history of poor PO intake over last several days with AMS. [phone]: Patient states has not eaten all day, regular food tray sent from dietary not to his liking, frozen TV meal prepared and he ate 75% of meal.     1825: After several attempts, another RN able to obtain lactic acid and CK. Sent to lab.

## 2022-01-29 NOTE — PROGRESS NOTES
End of Shift Note     Bedside shift change report given to Ko Quiñonez (oncoming nurse) by Ana Shipley (offgoing nurse). Report included the following information SBAR       Significant changes on shift: Arrived from ED with sepsis, UTI. New IV placed, patient ate food, blood work done, IV abx given. Activity:  Activity Level: Bed rest  Number times ambulated in hallways past shift: 0  Number of times OOB to chair past shift: 1 to comode     Cardiac:   Cardiac Monitoring: yes      Cardiac Rhythm: Sinus Tach     Access:   Current line(s): Peripheral IV RFA     Genitourinary:   Urinary status: voids, inc at times     Respiratory:   O2 Device: room air    GI:  Last Bowel Movement Date: today  Current diet:  Regular  Tolerating current diet: yes     Pain Management:   Patient states pain is manageable on current regimen: N/A, denies pain     Skin:  Intact, dry    Patient Safety:  Interventions: bed  alarm, assistive device (wheelchair),  and pt to call before getting OOB.  Prosthetic leg for left at beside  High Fall Risk: yes, double amputee   Length of Stay:  Expected DC date: TBD

## 2022-01-29 NOTE — PROGRESS NOTES
Initiated care plan at this time. Problem: Risk for Spread of Infection  Goal: Prevent transmission of infectious organism to others  Description: Prevent the transmission of infectious organisms to other patients, staff members, and visitors.   Outcome: Progressing Towards Goal     Problem: Patient Education:  Go to Education Activity  Goal: Patient/Family Education  Outcome: Progressing Towards Goal     Problem: Patient Education: Go to Patient Education Activity  Goal: Patient/Family Education  Outcome: Progressing Towards Goal     Problem: Sepsis: Day of Diagnosis  Goal: Off Pathway (Use only if patient is Off Pathway)  Outcome: Progressing Towards Goal  Goal: *Fluid resuscitation  Outcome: Progressing Towards Goal  Goal: *Paired blood cultures prior to first dose of antibiotic  Outcome: Progressing Towards Goal  Goal: *First dose of  appropriate antibiotic within 3 hours of arrival to ED, within 1 hour of arrival to ICU  Outcome: Progressing Towards Goal  Goal: *Lactic acid with first set of blood cultures  Outcome: Progressing Towards Goal  Goal: *Pneumococcal immunization (if eligible)  Outcome: Progressing Towards Goal  Goal: *Influenza immunization (if eligible)  Outcome: Progressing Towards Goal  Goal: Activity/Safety  Outcome: Progressing Towards Goal  Goal: Consults, if ordered  Outcome: Progressing Towards Goal  Goal: Diagnostic Test/Procedures  Outcome: Progressing Towards Goal  Goal: Nutrition/Diet  Outcome: Progressing Towards Goal  Goal: Discharge Planning  Outcome: Progressing Towards Goal  Goal: Medications  Outcome: Progressing Towards Goal  Goal: Respiratory  Outcome: Progressing Towards Goal  Goal: Treatments/Interventions/Procedures  Outcome: Progressing Towards Goal  Goal: Psychosocial  Outcome: Progressing Towards Goal     Problem: Sepsis: Day 2  Goal: Off Pathway (Use only if patient is Off Pathway)  Outcome: Progressing Towards Goal  Goal: *Central Venous Pressure maintained at 8-12 mm Hg  Outcome: Progressing Towards Goal  Goal: *Hemodynamically stable  Outcome: Progressing Towards Goal  Goal: *Tolerating diet  Outcome: Progressing Towards Goal  Goal: Activity/Safety  Outcome: Progressing Towards Goal  Goal: Consults, if ordered  Outcome: Progressing Towards Goal  Goal: Diagnostic Test/Procedures  Outcome: Progressing Towards Goal  Goal: Nutrition/Diet  Outcome: Progressing Towards Goal  Goal: Discharge Planning  Outcome: Progressing Towards Goal  Goal: Medications  Outcome: Progressing Towards Goal  Goal: Respiratory  Outcome: Progressing Towards Goal  Goal: Treatments/Interventions/Procedures  Outcome: Progressing Towards Goal  Goal: Psychosocial  Outcome: Progressing Towards Goal     Problem: Sepsis: Day 3  Goal: Off Pathway (Use only if patient is Off Pathway)  Outcome: Progressing Towards Goal  Goal: *Central Venous Pressure maintained at 8-12 mm Hg  Outcome: Progressing Towards Goal  Goal: *Oxygen saturation within defined limits  Outcome: Progressing Towards Goal  Goal: *Vital sign stability  Outcome: Progressing Towards Goal  Goal: *Tolerating diet  Outcome: Progressing Towards Goal  Goal: *Demonstrates progressive activity  Outcome: Progressing Towards Goal  Goal: Activity/Safety  Outcome: Progressing Towards Goal  Goal: Consults, if ordered  Outcome: Progressing Towards Goal  Goal: Diagnostic Test/Procedures  Outcome: Progressing Towards Goal  Goal: Nutrition/Diet  Outcome: Progressing Towards Goal  Goal: Discharge Planning  Outcome: Progressing Towards Goal  Goal: Medications  Outcome: Progressing Towards Goal  Goal: Respiratory  Outcome: Progressing Towards Goal  Goal: Treatments/Interventions/Procedures  Outcome: Progressing Towards Goal  Goal: Psychosocial  Outcome: Progressing Towards Goal     Problem: Sepsis: Day 4  Goal: Off Pathway (Use only if patient is Off Pathway)  Outcome: Progressing Towards Goal  Goal: Activity/Safety  Outcome: Progressing Towards Goal  Goal: Consults, if ordered  Outcome: Progressing Towards Goal  Goal: Diagnostic Test/Procedures  Outcome: Progressing Towards Goal  Goal: Nutrition/Diet  Outcome: Progressing Towards Goal  Goal: Discharge Planning  Outcome: Progressing Towards Goal  Goal: Medications  Outcome: Progressing Towards Goal  Goal: Respiratory  Outcome: Progressing Towards Goal  Goal: Treatments/Interventions/Procedures  Outcome: Progressing Towards Goal  Goal: Psychosocial  Outcome: Progressing Towards Goal  Goal: *Oxygen saturation within defined limits  Outcome: Progressing Towards Goal  Goal: *Hemodynamically stable  Outcome: Progressing Towards Goal  Goal: *Vital signs within defined limits  Outcome: Progressing Towards Goal  Goal: *Tolerating diet  Outcome: Progressing Towards Goal  Goal: *Demonstrates progressive activity  Outcome: Progressing Towards Goal  Goal: *Fluid volume maintenance  Outcome: Progressing Towards Goal     Problem: Sepsis: Day 5  Goal: Off Pathway (Use only if patient is Off Pathway)  Outcome: Progressing Towards Goal  Goal: *Oxygen saturation within defined limits  Outcome: Progressing Towards Goal  Goal: *Vital signs within defined limits  Outcome: Progressing Towards Goal  Goal: *Tolerating diet  Outcome: Progressing Towards Goal  Goal: *Demonstrates progressive activity  Outcome: Progressing Towards Goal  Goal: *Discharge plan identified  Outcome: Progressing Towards Goal  Goal: Activity/Safety  Outcome: Progressing Towards Goal  Goal: Consults, if ordered  Outcome: Progressing Towards Goal  Goal: Diagnostic Test/Procedures  Outcome: Progressing Towards Goal  Goal: Nutrition/Diet  Outcome: Progressing Towards Goal  Goal: Discharge Planning  Outcome: Progressing Towards Goal  Goal: Medications  Outcome: Progressing Towards Goal  Goal: Respiratory  Outcome: Progressing Towards Goal  Goal: Treatments/Interventions/Procedures  Outcome: Progressing Towards Goal  Goal: Psychosocial  Outcome: Progressing Towards Goal     Problem: Sepsis: Day 6  Goal: Off Pathway (Use only if patient is Off Pathway)  Outcome: Progressing Towards Goal  Goal: *Oxygen saturation within defined limits  Outcome: Progressing Towards Goal  Goal: *Vital signs within defined limits  Outcome: Progressing Towards Goal  Goal: *Tolerating diet  Outcome: Progressing Towards Goal  Goal: *Demonstrates progressive activity  Outcome: Progressing Towards Goal  Goal: Influenza immunization  Outcome: Progressing Towards Goal  Goal: *Pneumococcal immunization  Outcome: Progressing Towards Goal  Goal: Activity/Safety  Outcome: Progressing Towards Goal  Goal: Diagnostic Test/Procedures  Outcome: Progressing Towards Goal  Goal: Nutrition/Diet  Outcome: Progressing Towards Goal  Goal: Discharge Planning  Outcome: Progressing Towards Goal  Goal: Medications  Outcome: Progressing Towards Goal  Goal: Respiratory  Outcome: Progressing Towards Goal  Goal: Treatments/Interventions/Procedures  Outcome: Progressing Towards Goal  Goal: Psychosocial  Outcome: Progressing Towards Goal     Problem: Sepsis: Discharge Outcomes  Goal: *Vital signs within defined limits  Outcome: Progressing Towards Goal  Goal: *Tolerating diet  Outcome: Progressing Towards Goal  Goal: *Verbalizes understanding and describes prescribed diet  Outcome: Progressing Towards Goal  Goal: *Demonstrates progressive activity  Outcome: Progressing Towards Goal  Goal: *Describes follow-up/return visits to physicians  Outcome: Progressing Towards Goal  Goal: *Verbalizes name, dosage, time, side effects, and number of days to continue medications  Outcome: Progressing Towards Goal  Goal: *Influenza immunization (Oct-Mar only)  Outcome: Progressing Towards Goal  Goal: *Pneumococcal immunization  Outcome: Progressing Towards Goal  Goal: *Lungs clear or at baseline  Outcome: Progressing Towards Goal  Goal: *Oxygen saturation returns to baseline or 90% or better on room air  Outcome: Progressing Towards Goal  Goal: *Glycemic control  Outcome: Progressing Towards Goal  Goal: *Absence of deep venous thrombosis signs and symptoms(Stroke Metric)  Outcome: Progressing Towards Goal  Goal: *Describes available resources and support systems  Outcome: Progressing Towards Goal  Goal: *Optimal pain control at patient's stated goal  Outcome: Progressing Towards Goal

## 2022-01-29 NOTE — PROGRESS NOTES
Care plan reviewed. Problem: Risk for Spread of Infection  Goal: Prevent transmission of infectious organism to others  Description: Prevent the transmission of infectious organisms to other patients, staff members, and visitors.   Outcome: Progressing Towards Goal     Problem: Patient Education:  Go to Education Activity  Goal: Patient/Family Education  Outcome: Progressing Towards Goal     Problem: Patient Education: Go to Patient Education Activity  Goal: Patient/Family Education  Outcome: Progressing Towards Goal     Problem: Sepsis: Day of Diagnosis  Goal: Off Pathway (Use only if patient is Off Pathway)  Outcome: Progressing Towards Goal  Goal: *Fluid resuscitation  Outcome: Progressing Towards Goal  Goal: *Paired blood cultures prior to first dose of antibiotic  Outcome: Progressing Towards Goal  Goal: *First dose of  appropriate antibiotic within 3 hours of arrival to ED, within 1 hour of arrival to ICU  Outcome: Progressing Towards Goal  Goal: *Lactic acid with first set of blood cultures  Outcome: Progressing Towards Goal  Goal: *Pneumococcal immunization (if eligible)  Outcome: Progressing Towards Goal  Goal: *Influenza immunization (if eligible)  Outcome: Progressing Towards Goal  Goal: Activity/Safety  Outcome: Progressing Towards Goal  Goal: Consults, if ordered  Outcome: Progressing Towards Goal  Goal: Diagnostic Test/Procedures  Outcome: Progressing Towards Goal  Goal: Nutrition/Diet  Outcome: Progressing Towards Goal  Goal: Discharge Planning  Outcome: Progressing Towards Goal  Goal: Medications  Outcome: Progressing Towards Goal  Goal: Respiratory  Outcome: Progressing Towards Goal  Goal: Treatments/Interventions/Procedures  Outcome: Progressing Towards Goal  Goal: Psychosocial  Outcome: Progressing Towards Goal     Problem: Sepsis: Day of Diagnosis  Goal: Off Pathway (Use only if patient is Off Pathway)  Outcome: Progressing Towards Goal  Goal: *Fluid resuscitation  Outcome: Progressing Towards Goal  Goal: *Paired blood cultures prior to first dose of antibiotic  Outcome: Progressing Towards Goal  Goal: *First dose of  appropriate antibiotic within 3 hours of arrival to ED, within 1 hour of arrival to ICU  Outcome: Progressing Towards Goal  Goal: *Lactic acid with first set of blood cultures  Outcome: Progressing Towards Goal  Goal: *Pneumococcal immunization (if eligible)  Outcome: Progressing Towards Goal  Goal: *Influenza immunization (if eligible)  Outcome: Progressing Towards Goal  Goal: Activity/Safety  Outcome: Progressing Towards Goal  Goal: Consults, if ordered  Outcome: Progressing Towards Goal  Goal: Diagnostic Test/Procedures  Outcome: Progressing Towards Goal  Goal: Nutrition/Diet  Outcome: Progressing Towards Goal  Goal: Discharge Planning  Outcome: Progressing Towards Goal  Goal: Medications  Outcome: Progressing Towards Goal  Goal: Respiratory  Outcome: Progressing Towards Goal  Goal: Treatments/Interventions/Procedures  Outcome: Progressing Towards Goal  Goal: Psychosocial  Outcome: Progressing Towards Goal     Problem: Sepsis: Day 2  Goal: Off Pathway (Use only if patient is Off Pathway)  Outcome: Progressing Towards Goal  Goal: *Central Venous Pressure maintained at 8-12 mm Hg  Outcome: Progressing Towards Goal  Goal: *Hemodynamically stable  Outcome: Progressing Towards Goal  Goal: *Tolerating diet  Outcome: Progressing Towards Goal  Goal: Activity/Safety  Outcome: Progressing Towards Goal  Goal: Consults, if ordered  Outcome: Progressing Towards Goal  Goal: Diagnostic Test/Procedures  Outcome: Progressing Towards Goal  Goal: Nutrition/Diet  Outcome: Progressing Towards Goal  Goal: Discharge Planning  Outcome: Progressing Towards Goal  Goal: Medications  Outcome: Progressing Towards Goal  Goal: Respiratory  Outcome: Progressing Towards Goal  Goal: Treatments/Interventions/Procedures  Outcome: Progressing Towards Goal  Goal: Psychosocial  Outcome: Progressing Towards Goal

## 2022-01-29 NOTE — PROGRESS NOTES
Comprehensive Nutrition Assessment    Type and Reason for Visit: (P) Initial,Consult    Nutrition Recommendations/Plan: Diet as tolerated. RD will add additional food on meal trays and supplements for po intake and weight gain. Nutrition Assessment:  (P) Pt admitted with sepsis 2' UTI, ZEN on CKD, resolving with hydration. Hx notable for panc mass, CKD/nephrotic syndrome, anasarca, dementia, dyslipidemia, HTN, TD. Pt is s/p BLE amputation. This will affect BMI calculation. Pt has BLE amp( R AKA; L BKA) POA, therefore his adj BMI is 18.4 kg/(m^2) c/w underweight. Pt meets ASPEN criteria for severe malnutrition. Meets Criteria for Chronic Malnutrition   [] Severe Malnutrition, as evidenced by:   [x] Severe muscle wasting, loss of subcutaneous fat   [x] Nutritional intake of <75% of recommended intake for >1 month   [x] Weight loss of  >5% in 1 month, >7.5% in 3 months, >10% in 6 months, >20% in 1 year   [] Severe edema   []Moderate Malnutrition, as evidenced by:   [] Mild muscle wasting, loss of subcutaneous fat   [] Nutritional intake <75% of recommended intake for >1 month   [] Weight loss of  5% in 1 month, 7.5% in 3 months, 10% in 6 months, or 20% in 1 year   [] Mild edema           Estimated Daily Nutrient Needs:  Energy (kcal): (P) 1815; Weight Used for Energy Requirements: (P) Ideal  Protein (g): (P) 62 (1.4 g/kg CBW);  Weight Used for Protein Requirements: (P) Ideal  Fluid (ml/day): (P) 1815; Method Used for Fluid Requirements: (P) 1 ml/kcal      Nutrition Related Findings:  (P) Pt tolerating diet      Wounds:    (P) None       Current Nutrition Therapies:  ADULT DIET Regular    Anthropometric Measures:  · Height:  (P) 5' 6\" (167.6 cm)  · Current Body Wt:  (P) 43.1 kg (95 lb)   · Admission Body Wt:       · Usual Body Wt:        · Ideal Body Wt:  (P) 142 lbs:  (P) 66.9 %   · Adjusted Body Weight:  (P) 114.2; Weight Adjustment for: (P) Amputation   · Adjusted BMI:  (P) 18.4    · BMI Category: Nutrition Diagnosis:   · (P) Inadequate protein-energy intake,Increased nutrient needs,Moderate malnutrition,In context of chronic illness,Underweight,Unintended weight loss related to (P) cognitive or neurological impairment,inadequate protein-energy intake as evidenced by (P) poor intake prior to admission,BMI,weight loss greater than or equal to 5% in 1 month,moderate loss of subcutaneous fat,moderate muscle loss    Nutrition Interventions:   Food and/or Nutrient Delivery: (P) Continue current diet,Start oral nutrition supplement  Nutrition Education and Counseling: (P) Education not indicated  Coordination of Nutrition Care: (P) Continue to monitor while inpatient    Goals:  (P) Pt will consume > 75% meals and ONS next 4-6 days       Nutrition Monitoring and Evaluation:   Behavioral-Environmental Outcomes: (P) Knowledge or skill  Food/Nutrient Intake Outcomes: (P) Food and nutrient intake,Supplement intake  Physical Signs/Symptoms Outcomes: (P) Meal time behavior,Nutrition focused physical findings,Weight    Discharge Planning:    (P) Continue current diet     Electronically signed by Lilly Zamorano, PhD, RD, [address]  on 1/29/2022 at 10:49 AM    Contact: [phone]

## 2022-01-29 NOTE — PROGRESS NOTES
Reason for Admission:  Per ED MD, Dr. Lupis Solomon: 66 y.o. 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 intake. Wife reports of her last 2 days he has not been eating or drinking very much and has seemed more confused than normal. UA with evidence of UTI. Started on abx. Discussed with hospitalist for admission.  Admitting Dx: Sepsis                   RUR Score:     13%                Plan for utilizing home health:      TBD     PCP: First and Last name:  Layne Low MD     Name of Practice:    Are you a current patient: Yes/No: YES   Approximate date of last visit:    Can you participate in a virtual visit with your PCP:                     Current Advanced Directive/Advance Care Plan: Full Code - No ACP Documents in chart. Pt identified his wife, Carmina Vences [[phone]] as his Primary Healthcare Decision Maker. Healthcare Decision Maker:   Click here to complete [address] including selection of the Healthcare Decision Maker Relationship (ie \"Primary\")                          Transition of Care Plan:     CM reviewed pt chart. CM called and spoke to pt in his hospital room. Pt confirmed his home address as documented in chart. He reported his contact number as [phone]. He reported he lives with his wife, Junior Melchor, and her daughter. Pt was admitted on the Inpatient Status. He has medical insurance coverage through [address] [place] [address] A&. The first Important Message from Medicare [IMM] Letter reviewed and signed on 1/ 28/21. CM will review a 2nd IMM  Letter will be reviewed with pt at least 4 hours before D/C. Pt reported his wife would transport him home at D/C and to follow-up appointments. Pt was not able to recall the name of the Pharmacy he currently uses.   CM will continue to assist pt/family and medical team with D/C planning. Care Management Interventions  PCP Verified by CM: Yes (Dr. Dorcas Smith [phone])  Mode of Transport at Discharge:  Other (see comment) (Pt reported his wife, Denise Prather would transport him home at D/C.)  Transition of Care Consult (CM Consult): Discharge Planning  Health Maintenance Reviewed: Yes  Support Systems: Spouse/Significant Other (Pt reported he lives with his wife, Denise Prather and her daughter.)  Confirm Follow Up Transport: Family (Pt reported his wife would transport him to followup appointments)  The Patient and/or Patient Representative was Provided with a Choice of Provider and Agrees with the Discharge Plan?:  (CM to provide pt with Freedom of Choice list if ordered by MD)  [place] of Choice List was Provided with Basic Dialogue that Supports the Patient's Individualized Plan of Care/Goals, Treatment Preferences and Shares the Quality Data Associated with the Providers?: Yes  Discharge Location  Patient Expects to be Discharged to[de-identified] Home with outpatient services

## 2022-01-29 NOTE — PROGRESS NOTES
Hospitalist Progress Note    NAME: Allyn Mahoney   :  1950   MRN:  [id number]   Room Number:  511/91  @ [facility]       Interim Hospital Summary: 80 y.o. male whom presented on 2022 with      Assessment / Plan:  Anticipated discharge date : 2022  Anticipated disposition : Home  Barriers to discharge : medical stability      Sepsis POA due to Urinary tract infection POA  Leukocytosis POA, improving  UA positive for infection. CT A/P without contrast interpreted independently - no calculus, pyelonephritis, urinary obstruction. CK and lactic acid wnl.      - Continue ceftriaxone  - Follow up urine Cx  - IV hydration     Altered mental status POA, improving  Dementia POA  Suspect due to sepsis and ZEN. Underlying dementia. CT Head interpreted independently - no acute ischemia, hemorrhage or mass. Chronic encephal     - resume risperidone and donepezil.  - Frequent reorientation           ZEN POA (resolved) on CKD POA   Baseline creatinine 1.2-1.3. Current BUN/Cr 34/1. 84. CT A/P showed no obstruction     - Strict Is and Os,avoid nephrotoxic meds, renally dose meds  - IV fluids            Tachycardia POA   noted on admission. Suspect due to sepsis and hypovolemia. - Repeat EKG  - Fluid repletion         Amputation of bilateral lower extremities POA        HTN POA  HLD POA  - resume home meds         Body mass index is 15.33 kg/m². Malnutrition, severe, protein calorie     - RD consult      Code Status: full   Surrogate Decision Maker:  Wife      DVT Prophylaxis: Lovenox  GI Prophylaxis: not indicated  Baseline: ambulatory independently              Subjective:     Chief Complaint / Reason for Physician Visit  \"im doing ok \". Discussed with RN events overnight.      Review of Systems:  No fevers, chills, appetite change, cough, sputum production, shortness of breath, dyspnea on exertion, nausea, vomitting, diarrhea, constipation, chest pain, leg edema, abdominal pain, joint pain, rash, itching. Tolerating PT/OT. Tolerating diet. Objective:     VITALS:   Last 24hrs VS reviewed since prior progress note. Most recent are:  Patient Vitals for the past 24 hrs:   Temp Pulse Resp BP SpO2   01/29/22 0815 98 °F (36.7 °C) 90 14 135/89 100 %   01/29/22 0436 97.9 °F (36.6 °C) (!) 101 16 103/73 100 %   01/29/22 0352  100      01/28/22 2353  97      01/28/22 2320 98.6 °F (37 °C) 95 18 121/78 99 %   01/28/22 2000  (!) 107      01/28/22 1913 98 °F (36.7 °C) (!) 102 20 131/77 97 %   01/28/22 1711 98.8 °F (37.1 °C) (!) 105 20 (!) 121/99 97 %   01/28/22 1515  97   100 %   01/28/22 1500    126/82    01/28/22 1434  100   99 %   01/28/22 1411     99 %   01/28/22 1410  99   99 %   01/28/22 1400    127/74    01/28/22 1359    120/80    01/28/22 1209 98.7 °F (37.1 °C) (!) 106 20 106/63 100 %       Intake/Output Summary (Last 24 hours) at 1/29/2022 1055  Last data filed at 1/29/2022 0436  Gross per 24 hour   Intake 1241 ml   Output    Net 1241 ml        PHYSICAL EXAM:  General: WD, WN. Alert, cooperative, no acute distress    EENT:  EOMI. Anicteric sclerae. MMM  Resp:  CTA bilaterally, no wheezing or rales. No accessory muscle use  CV:  Regular  rhythm,  normal S1/S2, no murmurs rubs gallops, No edema  GI:  Soft, Non distended, Non tender. +Bowel sounds  Neurologic:  Alert and oriented X self and place not to time or situation, normal speech,   Psych:   Poor insight. Not anxious nor agitated  Skin:  No rashes.   No jaundice    Reviewed most current lab test results and cultures  YES  Reviewed most current radiology test results   YES  Review and summation of old records today    NO  Reviewed patient's current orders and MAR    YES  PMH/SH reviewed - no change compared to H&P  ________________________________________________________________________  Care Plan discussed with:    Comments   Patient x    Family      RN x    Care Manager x    Consultant x Multidiciplinary team rounds were held today with , nursing, pharmacist and clinical coordinator. Patient's plan of care was discussed; medications were reviewed and discharge planning was addressed. ________________________________________________________________________  Total NON critical care TIME: 25   Minutes    Total CRITICAL CARE TIME Spent:   Minutes non procedure based      Comments   >50% of visit spent in counseling and coordination of care x    ________________________________________________________________________  Lisa Dudley MD     Procedures: see electronic medical records for all procedures/Xrays and details which were not copied into this note but were reviewed prior to creation of Plan. LABS:  I reviewed today's most current labs and imaging studies.   Pertinent labs include:  Recent Labs     01/29/22  0915 01/28/22  1308   WBC 14.4* 22.0*   HGB 10.7* 13.3   HCT 30.4* 38.6   * 110*     Recent Labs     01/29/22  0915 01/28/22  1308   * 133*   K 3.7 3.8    100   CO2 25 29   * 122*   BUN 24* 34*   CREA 1.15 1.84*   CA 8.7 9.9   MG 1.8  --    PHOS 2.5*  --    ALB 2.4* 3.2*   TBILI 0.6 1.0   ALT 17 17       Signed: Lisa Dudley MD

## 2022-01-29 NOTE — PROGRESS NOTES
1930: Report from [facility]    2000: Pt resting comfortably, upright in bed, watching TV. Vitals and assessment WNL. No needs at this time. 2145: Meds given. Incontinence care provided. Pt comfortable at this time with no complaints. Urinal at bedside. 0000: Reassessment complete, no changes, resting comfortably. 0430: Incontinent, ayse-care performed. Vitals WNL. No changes on reassessment. 0515: Per lab, no MRSA swabs available. 3539: Sleeping quietly. No needs at this time.

## 2022-01-30 LAB
ANION GAP SERPL CALC-SCNC: 7 MMOL/L (ref 5–15)
BACTERIA SPEC CULT: ABNORMAL
BASOPHILS # BLD: 0.1 K/UL (ref 0–0.1)
BASOPHILS NFR BLD: 1 % (ref 0–1)
BUN SERPL-MCNC: 15 MG/DL (ref 6–20)
BUN/CREAT SERPL: 14 (ref 12–20)
CALCIUM SERPL-MCNC: 9.5 MG/DL (ref 8.5–10.1)
CC UR VC: ABNORMAL
CHLORIDE SERPL-SCNC: 106 MMOL/L (ref 97–108)
CO2 SERPL-SCNC: 24 MMOL/L (ref 21–32)
CREAT SERPL-MCNC: 1.04 MG/DL (ref 0.7–1.3)
DIFFERENTIAL METHOD BLD: ABNORMAL
EOSINOPHIL # BLD: 0.3 K/UL (ref 0–0.4)
EOSINOPHIL NFR BLD: 3 % (ref 0–7)
ERYTHROCYTE [DISTWIDTH] IN BLOOD BY AUTOMATED COUNT: 14.7 % (ref 11.5–14.5)
GLUCOSE SERPL-MCNC: 90 MG/DL (ref 65–100)
HCT VFR BLD AUTO: 32.1 % (ref 36.6–50.3)
HGB BLD-MCNC: 11.1 G/DL (ref 12.1–17)
IMM GRANULOCYTES # BLD AUTO: 0.1 K/UL (ref 0–0.04)
IMM GRANULOCYTES NFR BLD AUTO: 1 % (ref 0–0.5)
LYMPHOCYTES # BLD: 1.9 K/UL (ref 0.8–3.5)
LYMPHOCYTES NFR BLD: 23 % (ref 12–49)
MAGNESIUM SERPL-MCNC: 1.8 MG/DL (ref 1.6–2.4)
MCH RBC QN AUTO: 25.2 PG (ref 26–34)
MCHC RBC AUTO-ENTMCNC: 34.6 G/DL (ref 30–36.5)
MCV RBC AUTO: 72.8 FL (ref 80–99)
MONOCYTES # BLD: 0.8 K/UL (ref 0–1)
MONOCYTES NFR BLD: 10 % (ref 5–13)
NEUTS SEG # BLD: 5.2 K/UL (ref 1.8–8)
NEUTS SEG NFR BLD: 62 % (ref 32–75)
NRBC # BLD: 0 K/UL (ref 0–0.01)
NRBC BLD-RTO: 0 PER 100 WBC
PHOSPHATE SERPL-MCNC: 2.9 MG/DL (ref 2.6–4.7)
PLATELET # BLD AUTO: 141 K/UL (ref 150–400)
PMV BLD AUTO: 10.8 FL (ref 8.9–12.9)
POTASSIUM SERPL-SCNC: 4 MMOL/L (ref 3.5–5.1)
RBC # BLD AUTO: 4.41 M/UL (ref 4.1–5.7)
SERVICE CMNT-IMP: ABNORMAL
SODIUM SERPL-SCNC: 137 MMOL/L (ref 136–145)
WBC # BLD AUTO: 8.4 K/UL (ref 4.1–11.1)

## 2022-01-30 PROCEDURE — 85025 COMPLETE CBC W/AUTO DIFF WBC: CPT

## 2022-01-30 PROCEDURE — 74011250637 HC RX REV CODE- 250/637: Performed by: STUDENT IN AN ORGANIZED HEALTH CARE EDUCATION/TRAINING PROGRAM

## 2022-01-30 PROCEDURE — 74011250636 HC RX REV CODE- 250/636: Performed by: HOSPITALIST

## 2022-01-30 PROCEDURE — 83735 ASSAY OF MAGNESIUM: CPT

## 2022-01-30 PROCEDURE — 80048 BASIC METABOLIC PNL TOTAL CA: CPT

## 2022-01-30 PROCEDURE — 74011250636 HC RX REV CODE- 250/636: Performed by: STUDENT IN AN ORGANIZED HEALTH CARE EDUCATION/TRAINING PROGRAM

## 2022-01-30 PROCEDURE — 36415 COLL VENOUS BLD VENIPUNCTURE: CPT

## 2022-01-30 PROCEDURE — 84100 ASSAY OF PHOSPHORUS: CPT

## 2022-01-30 PROCEDURE — 74011000250 HC RX REV CODE- 250: Performed by: STUDENT IN AN ORGANIZED HEALTH CARE EDUCATION/TRAINING PROGRAM

## 2022-01-30 PROCEDURE — 74011000258 HC RX REV CODE- 258: Performed by: STUDENT IN AN ORGANIZED HEALTH CARE EDUCATION/TRAINING PROGRAM

## 2022-01-30 PROCEDURE — 65270000032 HC RM SEMIPRIVATE

## 2022-01-30 RX ORDER — HALOPERIDOL 5 MG/ML
1 INJECTION INTRAMUSCULAR ONCE
Status: COMPLETED | OUTPATIENT
Start: 2022-01-30 | End: 2022-01-30

## 2022-01-30 RX ORDER — HALOPERIDOL 5 MG/ML
0.5 INJECTION INTRAMUSCULAR
Status: DISCONTINUED | OUTPATIENT
Start: 2022-01-30 | End: 2022-01-31 | Stop reason: HOSPADM

## 2022-01-30 RX ADMIN — HALOPERIDOL LACTATE 1 MG: 5 INJECTION, SOLUTION INTRAMUSCULAR at 03:20

## 2022-01-30 RX ADMIN — HEPARIN SODIUM 5000 UNITS: 5000 INJECTION, SOLUTION INTRAVENOUS; SUBCUTANEOUS at 21:56

## 2022-01-30 RX ADMIN — HALOPERIDOL LACTATE 0.5 MG: 5 INJECTION, SOLUTION INTRAMUSCULAR at 22:09

## 2022-01-30 RX ADMIN — DONEPEZIL HYDROCHLORIDE 5 MG: 5 TABLET, FILM COATED ORAL at 21:56

## 2022-01-30 RX ADMIN — CEFTRIAXONE SODIUM 1 G: 1 INJECTION, POWDER, FOR SOLUTION INTRAMUSCULAR; INTRAVENOUS at 13:40

## 2022-01-30 RX ADMIN — SODIUM CHLORIDE, PRESERVATIVE FREE 10 ML: 5 INJECTION INTRAVENOUS at 22:20

## 2022-01-30 RX ADMIN — RISPERIDONE 0.5 MG: 1 TABLET ORAL at 21:56

## 2022-01-30 RX ADMIN — TAMSULOSIN HYDROCHLORIDE 0.4 MG: 0.4 CAPSULE ORAL at 21:56

## 2022-01-30 RX ADMIN — SODIUM CHLORIDE, PRESERVATIVE FREE 10 ML: 5 INJECTION INTRAVENOUS at 13:15

## 2022-01-30 NOTE — PROGRESS NOTES
**Physician Signature**  This document was electronically signed by: Ally Huddleston MD  01/29/2022 10:19 PM    **Consult Information**  Member Facility: [address] [place] [address] Rd[address] MRN: [id number]  Consult ID: [id number]  Facility Time Zone: ET  Date and Time of Request: 01/29/2022 10:10:30 PM  ET  Requesting Clinician:  [place] nurse  Patient Name: Chiara Morejon  YOB: 1950  Gender: Male    **Reason for Consult**  Reason for Consult: Review Patient Care    **Clinical Note**  Clinical Note: Patient is extremely confused and agitated. He already received Risperdal.  Order placed for Seroquel. **Attestation**  Interaction Mode: Phone Only  Phone Duration (mins): 5  Time of Call : 01/29/2022 10:13 PM  ET  Interaction Attestation: Interprofessional internet consultation was delivered through telephonic and/or electronic communication upon the request of the patients treating provider, while the requesting and the rendering provider were not in the same physical location. A written summary report was provided to the requesting provider at the originating site.   Evaluation Duration (mins): 8    **Physician Signature**  This document was electronically signed by: Ally Huddleston MD  01/29/2022 10:19 PM

## 2022-01-30 NOTE — PROGRESS NOTES
1920 - received verbal shift change report from Abner Rico RN to include SBAR, Kardex, [facility] and recent results. 2100 - pt moved from Rm 213 to PCU 3 to be monitored more closely D/T non-compliance w/ staff direction to stop trying to climb OOB. Video monitor is in place and bed alarm on but pt has still made multiple attempts to get OOB, not adhering to staff direction regarding safety concerns including the fact that pt is a significant fall risk. 2210: Tele med request sent to MD: Pt extremely confused. Didn't even know how to place straw to mouth to drink water when taking HS meds. Needs constant explanation and redirection. Repeated attempts to get OOB and pt is a double amputee. He's moderately agitated, very confused and anxious. Can he please have a PRN for the above? Thank you! 200 -  wanted pt placed on telemetry. Leads taken into room and explained to pt indication for telemetry and how the stickers are placed on his chest. Attempted to place first lead and pt slapped this writer's hand out of the way. Pt extremely confused and adamantly refused to have leads placed on him. Even demonstrated to pt by placing one lead on this writer, but pt raised voice and refused to have leads placed. 65 - Dr. Darby Crenshaw 12.5mg Seroquel PO but pt refused, raised his voice stating, \"I know what I take and how many pills I take and that ain't one of them! \" Tried educating pt on importance of adhering to MD orders and informed pt the medicine would allow his body to rest and heal but pt still adamantly refused to take it. 0015 - pt has been awake all night - has had a couple brief episodes of lying still in his bed, but the majority of the time, pt has been agitated, restless and rude to staff. Frequently fidgety, intermittently taking his prosthetic tool attachment on and off which has a long screw at the bottom. Encouraged pt to remove it while sleeping, but he repeatedly refuses.      0100 - repeatedly trying to put prosthesis on, trying to get OOB, stating he's going somewhere. Pt required consistent re-orientation to room and situation, reminding him he is in a hospital but pt appeared to remain disoriented with no evident understanding of situation. When pt was redirected,     0256: Telemed request sent: Pt becoming increasingly agitated, verbally aggressive. Disoriented. Major fall risk D/T being double amputee w/ multiple attempts to get OOB, refusing to comply w/ staff requests. Trying to put prosthetic device on, thinking his car is outside, demanding to leave. Yelling and cursing at staff. Can we please get a PRN? Thank you!    0300: incontinent of urine - incontinence care provided, brief, gabe and gown changed. Pt initally refused Security had to come speak with pt D/T escalating verbal aggression. Pt has been monitored via video monitor all night and had countless episodes of staff having to enter room to prevent him from getting OOB.     0320: Dr. Leona Oquendo 1mg Haldol IM - administered in L deltoid. Educated pt that the medication was ordered by the Dr. Robles Force did not resist when IM was given. 0 - Provider Message created: Pt would greatly benefit from a PRN to address ongoing episodes of  agitation/confusion pt was experiencing all night. Pt was verbally aggressive as well. Has not slept at all. He is a high fall risk and is non-compliant w/ staff redirection to stay in bed - making multiple attempts to get OOB by self. Thank you.     0500 - Pt has not slept at all. Educated pt on labs ordered and pt refused stating, \"You are not sticking me with anything! \" Also informed pt of importance of having an IV so he can receive scheduled Antbx. However, pt yelled at this writer while refusing IV.     0510 - incontinent of urine - incontinence care provided - brief changed. Video monitor remains in place. Bed alarm on and bed in lowest position, wheels locked and call bell w/ in reach.  Pt remains across from nurse's station for safety. 0 - gave verbal shift change report to Marie Baker RN to include SBAR, Kardex, [facility] and recent results.

## 2022-01-30 NOTE — PROGRESS NOTES
Hospitalist Progress Note    NAME: Conor    :  1950   MRN:  [id number]   Room Number:  IMA7/73  @ [address] [place] [address] Summary: 80 y.o. male whom presented on 2022 with      Assessment / Plan:    Anticipated discharge date : 2022  Anticipated disposition : Home  Barriers to discharge : medical stability        Sepsis POA resolved due to Urinary tract infection POA  Leukocytosis POA, improving  UA positive for infection. CT A/P without contrast interpreted independently - no calculus, pyelonephritis, urinary obstruction. CK and lactic acid wnl.      - Continue ceftriaxone  - Follow up urine Cx  - IV hydration     Altered mental status POA  Delirium not POA - due to hospital environment, acute infection  Dementia POA  Suspect due to sepsis and ZEN. Underlying dementia. CT Head interpreted independently - no acute ischemia, hemorrhage or mass. Chronic encephal     - resume risperidone and donepezil.  - PRN Haldol  - Sitter as needed   - Frequent reorientation           ZEN POA (resolved) on CKD POA   Baseline creatinine 1.2-1.3. Current BUN/Cr 34/1. 84. CT A/P showed no obstruction     - Strict Is and Os,avoid nephrotoxic meds, renally dose meds  - IV fluids            Tachycardia POA   noted on admission. Suspect due to sepsis and hypovolemia. - Repeat EKG  - Fluid repletion         Amputation of bilateral lower extremities POA        HTN POA  HLD POA  - resume home meds         Body mass index is 15.33 kg/m². Malnutrition, severe, protein calorie     - RD consult      Code Status: full   Surrogate Decision Maker:  Wife      DVT Prophylaxis: Lovenox  GI Prophylaxis: not indicated  Baseline: ambulatory independently                    Subjective:     Chief Complaint / Reason for Physician Visit  \"im home\".   Discussed with RN events overnight - agitated overnight    Review of Systems:  No fevers, chills, appetite change, cough, sputum production, shortness of breath, dyspnea on exertion, nausea, vomitting, diarrhea, constipation, chest pain, leg edema, abdominal pain, joint pain, rash, itching. Tolerating PT/OT. Tolerating diet. Objective:     VITALS:   Last 24hrs VS reviewed since prior progress note. Most recent are:  Patient Vitals for the past 24 hrs:   Temp Pulse Resp BP SpO2   01/30/22 0259 97.9 °F (36.6 °C) 95 18 (!) 149/78 97 %   01/29/22 2030 98 °F (36.7 °C) 97 18 (!) 152/89 95 %   01/29/22 1640 98.1 °F (36.7 °C) 99 18 (!) 149/97 96 %   01/29/22 1205 98.2 °F (36.8 °C) 94 16 128/72 96 %     No intake or output data in the 24 hours ending 01/30/22 1026     PHYSICAL EXAM:  General: Alert, cooperative, no acute distress    EENT:  EOMI. Anicteric sclerae. MMM  Resp:  CTA bilaterally, no wheezing or rales. No accessory muscle use  CV:  Regular  rhythm,  normal S1/S2, no murmurs rubs gallops, No edema  GI:  Soft, Non distended, Non tender. +Bowel sounds  Neurologic:  Alert and oriented X self only, normal speech,   Psych:   Poor insight. Not anxious nor agitated  Skin:  No rashes. No jaundice    Reviewed most current lab test results and cultures  YES  Reviewed most current radiology test results   YES  Review and summation of old records today    NO  Reviewed patient's current orders and MAR    YES  PMH/ reviewed - no change compared to H&P  ________________________________________________________________________  Care Plan discussed with:    Comments   Patient x    Family      RN x    Care Manager x    Consultant                       x Multidiciplinary team rounds were held today with , nursing, pharmacist and clinical coordinator. Patient's plan of care was discussed; medications were reviewed and discharge planning was addressed.      ________________________________________________________________________  Total NON critical care TIME:  25  Minutes    Total CRITICAL CARE TIME Spent:   Minutes non procedure based      Comments   >50% of visit spent in counseling and coordination of care     ________________________________________________________________________  Josee Bond MD     Procedures: see electronic medical records for all procedures/Xrays and details which were not copied into this note but were reviewed prior to creation of Plan. LABS:  I reviewed today's most current labs and imaging studies.   Pertinent labs include:  Recent Labs     01/29/22  0915 01/28/22  1308   WBC 14.4* 22.0*   HGB 10.7* 13.3   HCT 30.4* 38.6   * 110*     Recent Labs     01/29/22  0915 01/28/22  1308   * 133*   K 3.7 3.8    100   CO2 25 29   * 122*   BUN 24* 34*   CREA 1.15 1.84*   CA 8.7 9.9   MG 1.8  --    PHOS 2.5*  --    ALB 2.4* 3.2*   TBILI 0.6 1.0   ALT 17 17       Signed: Josee Bond MD

## 2022-01-31 VITALS
DIASTOLIC BLOOD PRESSURE: 78 MMHG | BODY MASS INDEX: 15.27 KG/M2 | OXYGEN SATURATION: 98 % | TEMPERATURE: 97.5 F | HEIGHT: 66 IN | HEART RATE: 95 BPM | SYSTOLIC BLOOD PRESSURE: 111 MMHG | WEIGHT: 95 LBS | RESPIRATION RATE: 16 BRPM

## 2022-01-31 PROCEDURE — 74011000258 HC RX REV CODE- 258: Performed by: STUDENT IN AN ORGANIZED HEALTH CARE EDUCATION/TRAINING PROGRAM

## 2022-01-31 PROCEDURE — 74011000250 HC RX REV CODE- 250: Performed by: STUDENT IN AN ORGANIZED HEALTH CARE EDUCATION/TRAINING PROGRAM

## 2022-01-31 PROCEDURE — 94760 N-INVAS EAR/PLS OXIMETRY 1: CPT

## 2022-01-31 PROCEDURE — 74011250637 HC RX REV CODE- 250/637: Performed by: STUDENT IN AN ORGANIZED HEALTH CARE EDUCATION/TRAINING PROGRAM

## 2022-01-31 PROCEDURE — 74011250636 HC RX REV CODE- 250/636: Performed by: STUDENT IN AN ORGANIZED HEALTH CARE EDUCATION/TRAINING PROGRAM

## 2022-01-31 RX ORDER — TRAZODONE HYDROCHLORIDE 50 MG/1
50 TABLET ORAL
Qty: 30 TABLET | Refills: 0 | Status: SHIPPED | OUTPATIENT
Start: 2022-01-31 | End: 2022-02-02

## 2022-01-31 RX ORDER — CEFDINIR 300 MG/1
300 CAPSULE ORAL 2 TIMES DAILY
Qty: 22 CAPSULE | Refills: 0 | Status: SHIPPED | OUTPATIENT
Start: 2022-01-31 | End: 2022-02-11

## 2022-01-31 RX ORDER — MELATONIN
2000 DAILY
Status: DISCONTINUED | OUTPATIENT
Start: 2022-01-31 | End: 2022-01-31 | Stop reason: HOSPADM

## 2022-01-31 RX ADMIN — SODIUM CHLORIDE, PRESERVATIVE FREE 10 ML: 5 INJECTION INTRAVENOUS at 06:16

## 2022-01-31 RX ADMIN — CEFTRIAXONE SODIUM 1 G: 1 INJECTION, POWDER, FOR SOLUTION INTRAMUSCULAR; INTRAVENOUS at 12:30

## 2022-01-31 RX ADMIN — HEPARIN SODIUM 5000 UNITS: 5000 INJECTION, SOLUTION INTRAVENOUS; SUBCUTANEOUS at 09:41

## 2022-01-31 RX ADMIN — ATORVASTATIN CALCIUM 10 MG: 10 TABLET, FILM COATED ORAL at 09:40

## 2022-01-31 RX ADMIN — Medication 2000 UNITS: at 12:31

## 2022-01-31 NOTE — DISCHARGE SUMMARY
Hospitalist Discharge Summary     Patient ID:  Jazminechristopher Baker  [id number]  80 y.o.  1950    PCP on record: Layne Low MD    Admit date: 1/28/2022  Discharge date and time: 1/31/2022      Admission Diagnoses: Sepsis [facility]) [A41.9]    Discharge Diagnoses: Active Problems:    Sepsis ([address] (1/28/2022)           Hospital Course:        Sepsis POA resolved due to Urinary tract infection POA  Leukocytosis POA  UA positive for infection. CT A/P without contrast interpreted independently - no calculus, pyelonephritis, urinary obstruction. CK and lactic acid wnl. Urine Cx growing Enterobacter cloacae. Discharged on oral Cefdinir.       Altered mental status POA  Delirium not POA - due to hospital environment, acute infection  Dementia POA  Suspect due to sepsis and ZEN. Underlying dementia. CT Head interpreted independently - no acute ischemia, hemorrhage or mass. Chronic encephal  - resume risperidone and donepezil.  - Trazodone QHS  - Frequent reorientation           ZEN POA (resolved) on CKD POA   Baseline creatinine 1.2-1.3. Current BUN/Cr 34/1. 84. CT A/P showed no obstruction              Tachycardia POA   noted on admission. Suspect due to sepsis and hypovolemia.        Amputation of bilateral lower extremities POA  wheelchair bound at baseline.      HTN POA  HLD POA  - resume home meds         Body mass index is 15.33 kg/m². Malnutrition, severe, protein calorie     - RD consult         CONSULTATIONS:  None    Excerpted HPI from H&P of [place] MD:  80 y.o.  male with PMH of dementia, HTN, HLD who presents to ED with c/o confusion. Patient's wife Raymundo Krause provides most of the history. For the past 3 days patient has been increasingly confused, sleeping all day, intermittent agitation, with poor appetite, decreased oral intake, urinary urgency, urinary incontinence. Patient was taken off risperidone 3 weeks ago but on 1/26 after an episode of agitation, wife restarted it.    At baseline, patient is oriented to AOx3, requires minimal assistance in ADLs,ambulates with wheelchair.      We were asked to admit for work up and evaluation of the above problems. ______________________________________________________________________  DISCHARGE SUMMARY/HOSPITAL COURSE:  for full details see H&P, daily progress notes, labs, consult notes. Visit Vitals  BP (!) 152/98 (BP 1 Location: Right upper arm, BP Patient Position: Lying left side)   Pulse 88   Temp 98 °F (36.7 °C)   Resp 16   Ht 5' 6\" (1.676 m)   Wt 43.1 kg (95 lb)   SpO2 99%   BMI 15.33 kg/m²     _______________________________________________________________________  Patient seen and examined by me on discharge day. Pertinent Findings:  Gen:    Not in distress  Chest: Clear lungs  CVS:   Regular rhythm. No edema  Abd:  Soft, not distended, not tender  Neuro:  Alert with good insight. Oriented to person, place, and time   _______________________________________________________________________  DISCHARGE MEDICATIONS:   Current Discharge Medication List      START taking these medications    Details   cefdinir (OMNICEF) 300 mg capsule Take 1 Capsule by mouth two (2) times a day for 11 days. Qty: 22 Capsule, Refills: 0  Start date: 1/31/2022, End date: 2/11/2022      traZODone (DESYREL) 50 mg tablet Take 1 Tablet by mouth nightly. Qty: 30 Tablet, Refills: 0  Start date: 1/31/2022         CONTINUE these medications which have NOT CHANGED    Details   atorvastatin (LIPITOR) 10 mg tablet Take 1 Tablet by mouth daily. Qty: 30 Tablet, Refills: 11      risperiDONE (RisperDAL) 0.5 mg tablet Take 1 Tablet by mouth nightly. 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 Qty: 30 Tablet, Refills: 11    Comments: Patient requests 30 day supply.   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     _______________________________________________________________________  DISPOSITION:     Home with Family: x   Home with HH/PT/OT/RN:    SNF/LTC:    JILLIAN:    OTHER:        Condition at Discharge:  Stable  _______________________________________________________________________  Follow up with:   PCP : Kayla Hinkle MD  Follow-up Information     Follow up With Specialties Details Why Contact Info    Kayla Hinkle MD Internal Medicine   [place] [address]  [place] [address] [phone]                Total time in minutes spent coordinating this discharge (includes going over instructions, follow-up, prescriptions, and preparing report for sign off to her PCP) :35 minutes    Signed:  Bradford Wills MD

## 2022-01-31 NOTE — PROGRESS NOTES
0715- shift change report given to Precious Jj (oncoming nurse) by Hernesto Yeung (offgoing nurse). Report included the following information SBAR. Labs, vitals and plan for the day. 0930- patient seen and assessed. patient resting comfortably in bed. Intermittentlty sleeping. Patient appears calm and cooperative. Pt refusing lab work for both day and night shift rns. Asked patient again this am and explained importance of labs continues to refuse. 1015- rounds completed with MD Fernando Agrawal. Per Md ok for patient to be discharged. Notified Md patient is refusing to wear cardiac monitor and refusing lab work. Per MD try to get labs but ok if patient is refusing. 1136- spoke to patient wife richardson to  patient in the next hour and a half. 1147- patient resting comfortably in bed. Assisted with tioleting. Continues to refuse lab work. 1233- explained the importance of flu shot with patient. Patient refused shot. Per patient \"No more needles, you ain't going to stick me no more\". Patient agreeable to IV antbiotics and po meds. 1330- patient IV removed, patient dressed, prosthetic put on left leg, discharge instructions reviewed with both patient and wife. Wife receptive to education but patient not receptive. Discussed changes to home meds, follow-up appointments, where to  prescriptions, stroke, heart attack and UTI education completed. Patient safely placed in wheelchair and escorted off floor with pct and wife.

## 2022-01-31 NOTE — PROGRESS NOTES
Physician Progress Note      Laura Crew  CSN #:                  [id number]  :                       1950  ADMIT DATE:       2022 12:38 PM  [address] [place] DATE:  RESPONDING  PROVIDER #:        Lauren Maier MD [facility] MD        QUERY TEXT:    Stage of Chronic Kidney Disease: Please provide further specificity, if known. Clinical indicators include: ckd, creatinine, bun/cr, bun, bun/creatinine, gfr  Options provided:  -- Chronic kidney disease stage 1  -- Chronic kidney disease stage 2  -- Chronic kidney disease stage 3  -- Chronic kidney disease stage 3a  -- Chronic kidney disease stage 3b  -- Chronic kidney disease stage 4  -- Chronic kidney disease stage 5  -- Chronic kidney disease stage 5, requiring dialysis  -- End stage renal disease  -- Other - I will add my own diagnosis  -- Disagree - Not applicable / Not valid  -- Disagree - Clinically Unable to determine / Unknown        PROVIDER RESPONSE TEXT:    The patient has chronic kidney disease stage 3.       Electronically signed by:  Lauren Maier MD [facility] MD 2022 12:07 PM

## 2022-01-31 NOTE — DISCHARGE INSTRUCTIONS
Patient Education        Pneumococcal Conjugate Vaccine for Children: Care Instructions  Your Care Instructions     The pneumococcal shot (PCV13) protects against a type of bacteria that causes pneumonia, meningitis, blood infections (sepsis), and ear infections. All children need four doses--one at age 2 months, one at 4 months, one at 7 months, and one at 15 to 17 months. If your child does not get the shots in this time frame, ask your doctor about a schedule for catch-up shots. The shot may cause pain or swelling in the area where the shot is given. It may cause your child to feel sleepy or not feel like eating or cause a fever. These reactions may last 1 to 2 days. Follow-up care is a key part of your child's treatment and safety. Be sure to make and go to all appointments, and call your doctor if your child is having problems. It's also a good idea to know your child's test results and keep a list of the medicines your child takes. How can you care for your child at home? · Give your child acetaminophen (Tylenol) or ibuprofen (Advil, Motrin) for fever or for pain at the shot area. Be safe with medicines. Read and follow all instructions on the label. Do not give aspirin to anyone younger than 20. It has been linked to Reye syndrome, a serious illness. · Do not give a child two or more pain medicines at the same time unless the doctor told you to. Many pain medicines have acetaminophen, which is Tylenol. Too much acetaminophen (Tylenol) can be harmful. · Put ice or a cold pack on the sore area for 10 to 20 minutes at a time. Put a thin cloth between the ice and your child's skin. When should you call for help? Call 911 anytime you think your child may need emergency care. For example, call if:    · Your child has a seizure.     · Your child has symptoms of a severe allergic reaction. These may include:  ? Sudden raised, red areas (hives) all over the body. ?  Swelling of the throat, mouth, lips, or tongue. ? Trouble breathing. ? Passing out (losing consciousness). Or your child may feel very lightheaded or suddenly feel weak, confused, or restless. Call your doctor now or seek immediate medical care if:    · Your child has symptoms of an allergic reaction, such as:  ? A rash or hives (raised, red areas on the skin). ? Itching. ? Swelling. ? Belly pain, nausea, or vomiting.     · Your child has a high fever.     · Your child cries for 3 hours or more within 2 to 3 days after getting the shot. Watch closely for changes in your child's health, and be sure to contact your doctor if your child has any problems. Where can you learn more? Go to http://www.gray.com/  Enter V860 in the search box to learn more about \"Pneumococcal Conjugate Vaccine for Children: Care Instructions. \"  Current as of: August 31, 2020               Content Version: 13.0  © 2006-2021 [employer]. Care instructions adapted under license by [employer] (which disclaims liability or warranty for this information). If you have questions about a medical condition or this instruction, always ask your healthcare professional. [place] [address] any warranty or liability for your use of this information. Patient Education        6 Ways to Prevent Sepsis in the Hospital  Sepsis is a life-threatening reaction to an infection. Sepsis can develop very quickly. It can damage tissue and organs. Most of the time, sepsis is caused by a bacterial infection. An illness, an injury, or a reaction to surgery can also cause it. Here are some things you can do to avoid sepsis while you're in the hospital.   Pay attention to how you feel. Sepsis can cause breathing problems, a fast heartbeat, chills, cool and clammy skin, skin rashes, and shaking. Other symptoms may include fever, low body temperature, confusion, and low blood pressure.  Sepsis often causes a combination of these symptoms. Tell someone on your care team if you are concerned about sepsis. If you have some of these symptoms and think something is wrong, tell a doctor or nurse. Tell them \"I'm concerned about sepsis. \"    Help prevent infections. Wash your hands often while you are in the hospital. Keep any cuts, scrapes, and stitches clean. Your doctor or nurse will help with this. Ask family or friends NOT to visit you if they're sick. It's best to avoid people who have colds and flu while you're in the hospital.    Make sure you've gotten recommended vaccines. If you've had vaccines to prevent pneumonia, flu, and other infections in the past, ask your doctor if you need another dose. Do not smoke or use other tobacco products. When you quit smoking, you are less likely to get a cold, flu, or infection. Now can be a good time to think about quitting for good. If you need help quitting, talk to your doctor about stop-smoking programs and medicines. Current as of: February 11, 2021               Content Version: 13.0  © 2006-2021 [employer]. Care instructions adapted under license by [employer] (which disclaims liability or warranty for this information). If you have questions about a medical condition or this instruction, always ask your healthcare professional. [place] [address] any warranty or liability for your use of this information. Patient Education        Urinary Tract Infections (UTI) in Men: Care Instructions  Overview     A urinary tract infection, or UTI, is a term for an infection anywhere between the kidneys and the urethra. (The urethra is the tube that carries urine from the bladder to outside the body.) Most UTIs are bladder infections. They often cause pain or burning when you urinate. UTIs are caused by bacteria. This means they can be cured with antibiotics.  Be sure to complete your treatment so that the infection does not get worse.  Follow-up care is a key part of your treatment and safety. Be sure to make and go to all appointments, and call your doctor if you are having problems. It's also a good idea to know your test results and keep a list of the medicines you take. How can you care for yourself at home? · Take your antibiotics as prescribed. Do not stop taking them just because you feel better. You need to take the full course of antibiotics. · Take your medicines exactly as prescribed. Your doctor may have prescribed a medicine, such as phenazopyridine (Pyridium), to help relieve pain when you urinate. This turns your urine orange. You may stop taking it when your symptoms get better. But be sure to take all of your antibiotics, which treat the infection. · Drink extra water for the next day or two. This will help make the urine less concentrated and help wash out the bacteria causing the infection. (If you have kidney, heart, or liver disease and have to limit your fluids, talk with your doctor before you increase your fluid intake.)  · Avoid drinks that are carbonated or have caffeine. They can irritate the bladder. · Urinate often. Try to empty your bladder each time. · To relieve pain, take a hot bath or lay a heating pad (set on low) over your lower belly or genital area. Never go to sleep with a heating pad in place. To help prevent UTIs  · Drink plenty of fluids. If you have kidney, heart, or liver disease and have to limit fluids, talk with your doctor before you increase the amount of fluids you drink. · Urinate when you have the urge. Do not hold your urine for a long time. Urinate before you go to sleep. · Keep your penis clean. Catheter care  If you have a drainage tube (catheter) in place, the following steps will help you care for it. · Always wash your hands before and after touching your catheter. · Check the area around the urethra for inflammation or signs of infection.  Signs of infection include irritated, swollen, red, or tender skin, or pus around the catheter. · Clean the area around the catheter with soap and water two times a day. Dry with a clean towel afterward. · Do not apply powder or lotion to the skin around the catheter. To empty the urine collection bag   · Wash your hands with soap and water. · Without touching the drain spout, remove the spout from its sleeve at the bottom of the collection bag. Open the valve on the spout. · Let the urine flow out of the bag and into the toilet or a container. Do not let the tubing or drain spout touch anything. · After you empty the bag, clean the end of the drain spout with tissue and water. Close the valve and put the drain spout back into its sleeve at the bottom of the collection bag. · Wash your hands with soap and water. When should you call for help? Call your doctor now or seek immediate medical care if:    · Symptoms such as a fever, chills, nausea, or vomiting get worse or happen for the first time.     · You have new pain in your back just below your rib cage. This is called flank pain.     · There is new blood or pus in your urine.     · You are not able to take or keep down your antibiotics. Watch closely for changes in your health, and be sure to contact your doctor if:    · You are not getting better after taking an antibiotic for 2 days.     · Your symptoms go away but then come back. Where can you learn more? Go to http://www.gray.com/  Enter C623 in the search box to learn more about \"Urinary Tract Infections (UTI) in Men: Care Instructions. \"  Current as of: February 10, 2021               Content Version: 13.0  © 6627-3686 [employer]. Care instructions adapted under license by [employer] (which disclaims liability or warranty for this information).  If you have questions about a medical condition or this instruction, always ask your healthcare professional. [employer] disclaims any warranty or liability for your use of this information. Patient Education        Male Urinary Tract: Anatomy Sketch     Current as of: February 10, 2021               Content Version: 13.0  © 2885-9835 [employer]. Care instructions adapted under license by [employer] (which disclaims liability or warranty for this information). If you have questions about a medical condition or this instruction, always ask your healthcare professional. [place] [address] any warranty or liability for your use of this information.

## 2022-01-31 NOTE — PROGRESS NOTES
Care plan reviewed. Problem: Risk for Spread of Infection  Goal: Prevent transmission of infectious organism to others  Description: Prevent the transmission of infectious organisms to other patients, staff members, and visitors. Outcome: Progressing Towards Goal     Problem: Patient Education:  Go to Education Activity  Goal: Patient/Family Education  Outcome: Progressing Towards Goal     Problem: Patient Education: Go to Patient Education Activity  Goal: Patient/Family Education  Outcome: Progressing Towards Goal     Problem: Sepsis: Day 2  Goal: Off Pathway (Use only if patient is Off Pathway)  Outcome: Progressing Towards Goal  Goal: *Central Venous Pressure maintained at 8-12 mm Hg  Outcome: Progressing Towards Goal  Goal: *Hemodynamically stable  Outcome: Progressing Towards Goal  Goal: *Tolerating diet  Outcome: Progressing Towards Goal  Goal: Activity/Safety  Outcome: Progressing Towards Goal  Goal: Nutrition/Diet  Outcome: Progressing Towards Goal  Goal: Discharge Planning  Outcome: Progressing Towards Goal  Goal: Medications  Outcome: Progressing Towards Goal  Goal: Treatments/Interventions/Procedures  Outcome: Progressing Towards Goal     Problem: Falls - Risk of  Goal: *Absence of Falls  Description: Document Telly Fall Risk and appropriate interventions in the flowsheet.   Outcome: Progressing Towards Goal  Note: Fall Risk Interventions:  Mobility Interventions: Bed/chair exit alarm,Patient to call before getting OOB,Utilize walker, cane, or other assistive device    Mentation Interventions: Adequate sleep, hydration, pain control,Bed/chair exit alarm,Door open when patient unattended,Evaluate medications/consider consulting pharmacy,More frequent rounding,Reorient patient,Room close to nurse's station,Update white board    Medication Interventions: Bed/chair exit alarm,Evaluate medications/consider consulting pharmacy,Patient to call before getting OOB    Elimination Interventions: Bed/chair exit alarm,Call light in reach,Patient to call for help with toileting needs,Urinal in reach              Problem: Patient Education: Go to Patient Education Activity  Goal: Patient/Family Education  Outcome: Progressing Towards Goal     Problem: Pressure Injury - Risk of  Goal: *Prevention of pressure injury  Description: Document Geo Scale and appropriate interventions in the flowsheet.   Outcome: Progressing Towards Goal  Note: Pressure Injury Interventions:  Sensory Interventions: Assess changes in LOC,Avoid rigorous massage over bony prominences,Check visual cues for pain,Minimize linen layers,Monitor skin under medical devices,Pressure redistribution bed/mattress (bed type)    Moisture Interventions: Absorbent underpads,Check for incontinence Q2 hours and as needed,Limit adult briefs,Maintain skin hydration (lotion/cream),Minimize layers    Activity Interventions: Pressure redistribution bed/mattress(bed type)    Mobility Interventions: HOB 30 degrees or less,Pressure redistribution bed/mattress (bed type)    Nutrition Interventions: Offer support with meals,snacks and hydration,Document food/fluid/supplement intake    Friction and Shear Interventions: HOB 30 degrees or less,Lift sheet,Minimize layers

## 2022-01-31 NOTE — PROGRESS NOTES
Problem: Risk for Spread of Infection  Goal: Prevent transmission of infectious organism to others  Description: Prevent the transmission of infectious organisms to other patients, staff members, and visitors.   Outcome: Progressing Towards Goal     Problem: Patient Education:  Go to Education Activity  Goal: Patient/Family Education  Outcome: Progressing Towards Goal     Problem: Patient Education: Go to Patient Education Activity  Goal: Patient/Family Education  Outcome: Progressing Towards Goal     Problem: Sepsis: Day of Diagnosis  Goal: Off Pathway (Use only if patient is Off Pathway)  Outcome: Progressing Towards Goal  Goal: *Fluid resuscitation  Outcome: Progressing Towards Goal  Goal: *Paired blood cultures prior to first dose of antibiotic  Outcome: Progressing Towards Goal  Goal: *First dose of  appropriate antibiotic within 3 hours of arrival to ED, within 1 hour of arrival to ICU  Outcome: Progressing Towards Goal  Goal: *Lactic acid with first set of blood cultures  Outcome: Progressing Towards Goal  Goal: *Pneumococcal immunization (if eligible)  Outcome: Progressing Towards Goal  Goal: *Influenza immunization (if eligible)  Outcome: Progressing Towards Goal  Goal: Activity/Safety  Outcome: Progressing Towards Goal  Goal: Consults, if ordered  Outcome: Progressing Towards Goal  Goal: Diagnostic Test/Procedures  Outcome: Progressing Towards Goal  Goal: Nutrition/Diet  Outcome: Progressing Towards Goal  Goal: Discharge Planning  Outcome: Progressing Towards Goal  Goal: Medications  Outcome: Progressing Towards Goal  Goal: Respiratory  Outcome: Progressing Towards Goal  Goal: Treatments/Interventions/Procedures  Outcome: Progressing Towards Goal  Goal: Psychosocial  Outcome: Progressing Towards Goal     Problem: Sepsis: Day 2  Goal: Off Pathway (Use only if patient is Off Pathway)  Outcome: Progressing Towards Goal  Goal: *Central Venous Pressure maintained at 8-12 mm Hg  Outcome: Progressing Towards Goal  Goal: *Hemodynamically stable  Outcome: Progressing Towards Goal  Goal: *Tolerating diet  Outcome: Progressing Towards Goal  Goal: Activity/Safety  Outcome: Progressing Towards Goal  Goal: Consults, if ordered  Outcome: Progressing Towards Goal  Goal: Diagnostic Test/Procedures  Outcome: Progressing Towards Goal  Goal: Nutrition/Diet  Outcome: Progressing Towards Goal  Goal: Discharge Planning  Outcome: Progressing Towards Goal  Goal: Medications  Outcome: Progressing Towards Goal  Goal: Respiratory  Outcome: Progressing Towards Goal  Goal: Treatments/Interventions/Procedures  Outcome: Progressing Towards Goal  Goal: Psychosocial  Outcome: Progressing Towards Goal     Problem: Sepsis: Day 3  Goal: Off Pathway (Use only if patient is Off Pathway)  Outcome: Progressing Towards Goal  Goal: *Central Venous Pressure maintained at 8-12 mm Hg  Outcome: Progressing Towards Goal  Goal: *Oxygen saturation within defined limits  Outcome: Progressing Towards Goal  Goal: *Vital sign stability  Outcome: Progressing Towards Goal  Goal: *Tolerating diet  Outcome: Progressing Towards Goal  Goal: *Demonstrates progressive activity  Outcome: Progressing Towards Goal  Goal: Activity/Safety  Outcome: Progressing Towards Goal  Goal: Consults, if ordered  Outcome: Progressing Towards Goal  Goal: Diagnostic Test/Procedures  Outcome: Progressing Towards Goal  Goal: Nutrition/Diet  Outcome: Progressing Towards Goal  Goal: Discharge Planning  Outcome: Progressing Towards Goal  Goal: Medications  Outcome: Progressing Towards Goal  Goal: Respiratory  Outcome: Progressing Towards Goal  Goal: Treatments/Interventions/Procedures  Outcome: Progressing Towards Goal  Goal: Psychosocial  Outcome: Progressing Towards Goal     Problem: Sepsis: Day 4  Goal: Off Pathway (Use only if patient is Off Pathway)  Outcome: Progressing Towards Goal  Goal: Activity/Safety  Outcome: Progressing Towards Goal  Goal: Consults, if ordered  Outcome: Progressing Towards Goal  Goal: Diagnostic Test/Procedures  Outcome: Progressing Towards Goal  Goal: Nutrition/Diet  Outcome: Progressing Towards Goal  Goal: Discharge Planning  Outcome: Progressing Towards Goal  Goal: Medications  Outcome: Progressing Towards Goal  Goal: Respiratory  Outcome: Progressing Towards Goal  Goal: Treatments/Interventions/Procedures  Outcome: Progressing Towards Goal  Goal: Psychosocial  Outcome: Progressing Towards Goal  Goal: *Oxygen saturation within defined limits  Outcome: Progressing Towards Goal  Goal: *Hemodynamically stable  Outcome: Progressing Towards Goal  Goal: *Vital signs within defined limits  Outcome: Progressing Towards Goal  Goal: *Tolerating diet  Outcome: Progressing Towards Goal  Goal: *Demonstrates progressive activity  Outcome: Progressing Towards Goal  Goal: *Fluid volume maintenance  Outcome: Progressing Towards Goal     Problem: Sepsis: Day 5  Goal: Off Pathway (Use only if patient is Off Pathway)  Outcome: Progressing Towards Goal  Goal: *Oxygen saturation within defined limits  Outcome: Progressing Towards Goal  Goal: *Vital signs within defined limits  Outcome: Progressing Towards Goal  Goal: *Tolerating diet  Outcome: Progressing Towards Goal  Goal: *Demonstrates progressive activity  Outcome: Progressing Towards Goal  Goal: *Discharge plan identified  Outcome: Progressing Towards Goal  Goal: Activity/Safety  Outcome: Progressing Towards Goal  Goal: Consults, if ordered  Outcome: Progressing Towards Goal  Goal: Diagnostic Test/Procedures  Outcome: Progressing Towards Goal  Goal: Nutrition/Diet  Outcome: Progressing Towards Goal  Goal: Discharge Planning  Outcome: Progressing Towards Goal  Goal: Medications  Outcome: Progressing Towards Goal  Goal: Respiratory  Outcome: Progressing Towards Goal  Goal: Treatments/Interventions/Procedures  Outcome: Progressing Towards Goal  Goal: Psychosocial  Outcome: Progressing Towards Goal     Problem: Sepsis: Day 6  Goal: Off Pathway (Use only if patient is Off Pathway)  Outcome: Progressing Towards Goal  Goal: *Oxygen saturation within defined limits  Outcome: Progressing Towards Goal  Goal: *Vital signs within defined limits  Outcome: Progressing Towards Goal  Goal: *Tolerating diet  Outcome: Progressing Towards Goal  Goal: *Demonstrates progressive activity  Outcome: Progressing Towards Goal  Goal: Influenza immunization  Outcome: Progressing Towards Goal  Goal: *Pneumococcal immunization  Outcome: Progressing Towards Goal  Goal: Activity/Safety  Outcome: Progressing Towards Goal  Goal: Diagnostic Test/Procedures  Outcome: Progressing Towards Goal  Goal: Nutrition/Diet  Outcome: Progressing Towards Goal  Goal: Discharge Planning  Outcome: Progressing Towards Goal  Goal: Medications  Outcome: Progressing Towards Goal  Goal: Respiratory  Outcome: Progressing Towards Goal  Goal: Treatments/Interventions/Procedures  Outcome: Progressing Towards Goal  Goal: Psychosocial  Outcome: Progressing Towards Goal     Problem: Sepsis: Discharge Outcomes  Goal: *Vital signs within defined limits  Outcome: Progressing Towards Goal  Goal: *Tolerating diet  Outcome: Progressing Towards Goal  Goal: *Verbalizes understanding and describes prescribed diet  Outcome: Progressing Towards Goal  Goal: *Demonstrates progressive activity  Outcome: Progressing Towards Goal  Goal: *Describes follow-up/return visits to physicians  Outcome: Progressing Towards Goal  Goal: *Verbalizes name, dosage, time, side effects, and number of days to continue medications  Outcome: Progressing Towards Goal  Goal: *Influenza immunization (Oct-Mar only)  Outcome: Progressing Towards Goal  Goal: *Pneumococcal immunization  Outcome: Progressing Towards Goal  Goal: *Lungs clear or at baseline  Outcome: Progressing Towards Goal  Goal: *Oxygen saturation returns to baseline or 90% or better on room air  Outcome: Progressing Towards Goal  Goal: *Glycemic control  Outcome: Progressing Towards Goal  Goal: *Absence of deep venous thrombosis signs and symptoms(Stroke Metric)  Outcome: Progressing Towards Goal  Goal: *Describes available resources and support systems  Outcome: Progressing Towards Goal  Goal: *Optimal pain control at patient's stated goal  Outcome: Progressing Towards Goal

## 2022-01-31 NOTE — PROGRESS NOTES
NELDA  RUR 11  LOS 3d  ELOS Discharge expected today. 1000  IDT met to discuss plan of care. Patient to be discharged home today with wife. '    CM called Dr. Shazia Ambrose office to schedule a f/u appointment. Appointment scheduled for 2/2. Put on AVS.  Medications need to be called into [facility] on [address] and [address]. Wife to provide transportation home. CM met with patient to discuss second IM letter. Patient signed and letter was placed in chart. Discussed upcoming appointment with PCP on Wednesday.  these medications from [employer] [employer], [address]  1 cefdinir  2 traZODone   [employer]    Follow up with Jeannette Reilly MD on 2/2/2022   Specialty: Internal Medicine   [address]   [address] [place] [address], [facility]   [phone]   Your appointment time is 1:15pm.  , Please keep this appointment, Please arrive 15 minutes early., Bring ins card, picture id, and discharge papers    Care Management Interventions  PCP Verified by CM:  Yes  Mode of Transport at Discharge: Self  Transition of Care Consult (CM Consult): Discharge Planning  Health Maintenance Reviewed: Yes  Support Systems: Spouse/Significant Other,Other Family Member(s)  Confirm Follow Up Transport: Family  The Plan for Transition of Care is Related to the Following Treatment Goals : F/U PCP Appointment  The Patient and/or Patient Representative was Provided with a Choice of Provider and Agrees with the Discharge Plan?: Yes  Name of the Patient Representative Who was Provided with a Choice of Provider and Agrees with the Discharge Plan: Patient  Freedom of Choice List was Provided with Basic Dialogue that Supports the Patient's Individualized Plan of Care/Goals, Treatment Preferences and Shares the Quality Data Associated with the Providers?: Yes  Discharge Location  Patient Expects to be Discharged to[de-identified]  (Home)     Eriberto Chu BSW/  [phone]

## 2022-01-31 NOTE — PROGRESS NOTES
Pharmacist Discharge Medication Reconciliation    Discharge Provider:  Karlie Gaines        Discharge Medications:      My Medications        START taking these medications        Instructions Each Dose to Equal Morning Noon Evening Bedtime   cefdinir 300 mg capsule  Commonly known as: OMNICEF    Your last dose was: Your next dose is: Take 1 Capsule by mouth two (2) times a day for 11 days. 300 mg                 traZODone 50 mg tablet  Commonly known as: DESYREL    Your last dose was: Your next dose is: Take 1 Tablet by mouth nightly. 50 mg                        CHANGE how you take these medications        Instructions Each Dose to Equal Morning Noon Evening Bedtime   tamsulosin 0.4 mg capsule  Commonly known as: FLOMAX  What changed:   how much to take  how to take this  when to take this  additional instructions    Your last dose was: Your next dose is:         TAKE 1 CAPSULE BY MOUTH EVERY DAY                         CONTINUE taking these medications        Instructions Each Dose to Equal Morning Noon Evening Bedtime   atorvastatin 10 mg tablet  Commonly known as: LIPITOR    Your last dose was: Your next dose is: Take 1 Tablet by mouth daily. 10 mg                 donepeziL 5 mg tablet  Commonly known as: ARICEPT    Your last dose was: Your next dose is: Take 1 Tablet by mouth nightly. 5 mg                 risperiDONE 0.5 mg tablet  Commonly known as: RisperDAL    Your last dose was: Your next dose is: Take 1 Tablet by mouth nightly.    0.5 mg                           Where to Get Your Medications        These medications were sent to [facility] [address] [place] [address] AT [address]      Phone: [phone]   cefdinir 300 mg capsule  traZODone 50 mg tablet          The patient's chart, MAR, and AVS were reviewed by   Tyrell Earl, PHARMD,   Contact: [phone]

## 2022-01-31 NOTE — PROGRESS NOTES
Care plan reviewed, patient is progressing towards goals. Problem: Risk for Spread of Infection  Goal: Prevent transmission of infectious organism to others  Description: Prevent the transmission of infectious organisms to other patients, staff members, and visitors.   Outcome: Progressing Towards Goal     Problem: Patient Education:  Go to Education Activity  Goal: Patient/Family Education  Outcome: Progressing Towards Goal     Problem: Patient Education: Go to Patient Education Activity  Goal: Patient/Family Education  Outcome: Progressing Towards Goal     Problem: Sepsis: Day of Diagnosis  Goal: Off Pathway (Use only if patient is Off Pathway)  Outcome: Progressing Towards Goal  Goal: *Fluid resuscitation  Outcome: Progressing Towards Goal  Goal: *Paired blood cultures prior to first dose of antibiotic  Outcome: Progressing Towards Goal  Goal: *First dose of  appropriate antibiotic within 3 hours of arrival to ED, within 1 hour of arrival to ICU  Outcome: Progressing Towards Goal  Goal: *Lactic acid with first set of blood cultures  Outcome: Progressing Towards Goal  Goal: *Pneumococcal immunization (if eligible)  Outcome: Progressing Towards Goal  Goal: *Influenza immunization (if eligible)  Outcome: Progressing Towards Goal  Goal: Activity/Safety  Outcome: Progressing Towards Goal  Goal: Consults, if ordered  Outcome: Progressing Towards Goal  Goal: Diagnostic Test/Procedures  Outcome: Progressing Towards Goal  Goal: Nutrition/Diet  Outcome: Progressing Towards Goal  Goal: Discharge Planning  Outcome: Progressing Towards Goal  Goal: Medications  Outcome: Progressing Towards Goal  Goal: Respiratory  Outcome: Progressing Towards Goal  Goal: Treatments/Interventions/Procedures  Outcome: Progressing Towards Goal  Goal: Psychosocial  Outcome: Progressing Towards Goal     Problem: Sepsis: Day 2  Goal: Off Pathway (Use only if patient is Off Pathway)  Outcome: Progressing Towards Goal  Goal: *Central Venous Pressure maintained at 8-12 mm Hg  Outcome: Progressing Towards Goal  Goal: *Hemodynamically stable  Outcome: Progressing Towards Goal  Goal: *Tolerating diet  Outcome: Progressing Towards Goal  Goal: Activity/Safety  Outcome: Progressing Towards Goal  Goal: Consults, if ordered  Outcome: Progressing Towards Goal  Goal: Diagnostic Test/Procedures  Outcome: Progressing Towards Goal  Goal: Nutrition/Diet  Outcome: Progressing Towards Goal  Goal: Discharge Planning  Outcome: Progressing Towards Goal  Goal: Medications  Outcome: Progressing Towards Goal  Goal: Respiratory  Outcome: Progressing Towards Goal  Goal: Treatments/Interventions/Procedures  Outcome: Progressing Towards Goal  Goal: Psychosocial  Outcome: Progressing Towards Goal     Problem: Sepsis: Day 4  Goal: Off Pathway (Use only if patient is Off Pathway)  Outcome: Progressing Towards Goal  Goal: Activity/Safety  Outcome: Progressing Towards Goal  Goal: Consults, if ordered  Outcome: Progressing Towards Goal  Goal: Diagnostic Test/Procedures  Outcome: Progressing Towards Goal  Goal: Nutrition/Diet  Outcome: Progressing Towards Goal  Goal: Discharge Planning  Outcome: Progressing Towards Goal  Goal: Medications  Outcome: Progressing Towards Goal  Goal: Respiratory  Outcome: Progressing Towards Goal  Goal: Treatments/Interventions/Procedures  Outcome: Progressing Towards Goal  Goal: Psychosocial  Outcome: Progressing Towards Goal  Goal: *Oxygen saturation within defined limits  Outcome: Progressing Towards Goal  Goal: *Hemodynamically stable  Outcome: Progressing Towards Goal  Goal: *Vital signs within defined limits  Outcome: Progressing Towards Goal  Goal: *Tolerating diet  Outcome: Progressing Towards Goal  Goal: *Demonstrates progressive activity  Outcome: Progressing Towards Goal  Goal: *Fluid volume maintenance  Outcome: Progressing Towards Goal     Problem: Sepsis: Day 5  Goal: Off Pathway (Use only if patient is Off Pathway)  Outcome: Progressing Towards Goal  Goal: *Oxygen saturation within defined limits  Outcome: Progressing Towards Goal  Goal: *Vital signs within defined limits  Outcome: Progressing Towards Goal  Goal: *Tolerating diet  Outcome: Progressing Towards Goal  Goal: *Demonstrates progressive activity  Outcome: Progressing Towards Goal  Goal: *Discharge plan identified  Outcome: Progressing Towards Goal  Goal: Activity/Safety  Outcome: Progressing Towards Goal  Goal: Consults, if ordered  Outcome: Progressing Towards Goal  Goal: Diagnostic Test/Procedures  Outcome: Progressing Towards Goal  Goal: Nutrition/Diet  Outcome: Progressing Towards Goal  Goal: Discharge Planning  Outcome: Progressing Towards Goal  Goal: Medications  Outcome: Progressing Towards Goal  Goal: Respiratory  Outcome: Progressing Towards Goal  Goal: Treatments/Interventions/Procedures  Outcome: Progressing Towards Goal  Goal: Psychosocial  Outcome: Progressing Towards Goal     Problem: Sepsis: Day 6  Goal: Off Pathway (Use only if patient is Off Pathway)  Outcome: Progressing Towards Goal  Goal: *Oxygen saturation within defined limits  Outcome: Progressing Towards Goal  Goal: *Vital signs within defined limits  Outcome: Progressing Towards Goal  Goal: *Tolerating diet  Outcome: Progressing Towards Goal  Goal: *Demonstrates progressive activity  Outcome: Progressing Towards Goal  Goal: Influenza immunization  Outcome: Progressing Towards Goal  Goal: *Pneumococcal immunization  Outcome: Progressing Towards Goal  Goal: Activity/Safety  Outcome: Progressing Towards Goal  Goal: Diagnostic Test/Procedures  Outcome: Progressing Towards Goal  Goal: Nutrition/Diet  Outcome: Progressing Towards Goal  Goal: Discharge Planning  Outcome: Progressing Towards Goal  Goal: Medications  Outcome: Progressing Towards Goal  Goal: Respiratory  Outcome: Progressing Towards Goal  Goal: Treatments/Interventions/Procedures  Outcome: Progressing Towards Goal  Goal: Psychosocial  Outcome: Progressing Towards Goal     Problem: Sepsis: Discharge Outcomes  Goal: *Vital signs within defined limits  Outcome: Progressing Towards Goal  Goal: *Tolerating diet  Outcome: Progressing Towards Goal  Goal: *Verbalizes understanding and describes prescribed diet  Outcome: Progressing Towards Goal  Goal: *Demonstrates progressive activity  Outcome: Progressing Towards Goal  Goal: *Describes follow-up/return visits to physicians  Outcome: Progressing Towards Goal  Goal: *Verbalizes name, dosage, time, side effects, and number of days to continue medications  Outcome: Progressing Towards Goal  Goal: *Influenza immunization (Oct-Mar only)  Outcome: Progressing Towards Goal  Goal: *Pneumococcal immunization  Outcome: Progressing Towards Goal  Goal: *Lungs clear or at baseline  Outcome: Progressing Towards Goal  Goal: *Oxygen saturation returns to baseline or 90% or better on room air  Outcome: Progressing Towards Goal  Goal: *Glycemic control  Outcome: Progressing Towards Goal  Goal: *Absence of deep venous thrombosis signs and symptoms(Stroke Metric)  Outcome: Progressing Towards Goal  Goal: *Describes available resources and support systems  Outcome: Progressing Towards Goal  Goal: *Optimal pain control at patient's stated goal  Outcome: Progressing Towards Goal

## 2022-01-31 NOTE — PROGRESS NOTES
Physician Progress Note      Sandro Vann  CSN #:                  [id number]  :                       1950  ADMIT DATE:       2022 12:38 PM  [address] [place] DATE:  RESPONDING  PROVIDER #:        Ayden Mccollum MD          QUERY TEXT:    Pt admitted with Confusion and has Hx. dementia documented. Pt noted to have \"Delirium not POA - due to hospital environment, acute infection\". If possible, please document in the progress notes and discharge summary if you are evaluating and / or treating any of the following: The medical record reflects the following:  Risk Factors: Hx: Dementia  Clinical Indicators: Confusion; Alert and oriented X self and place. ... Ritu Neff Noted per RN: Pt becoming increasingly agitated, verbally aggressive. Disoriented  Treatment: resume risperidone and donepezil.; PRN Haldol; Sitter as needed; Frequent reorientation; Thank you,    Beth Tapia  CDI  [id number]  Options provided:  -- (type, if known) Dementia without behavioral disturbance  -- (type, if known) Dementia with behavioral disturbance  -- Other - I will add my own diagnosis  -- Disagree - Not applicable / Not valid  -- Disagree - Clinically unable to determine / Unknown  -- Refer to Clinical Documentation Reviewer    PROVIDER RESPONSE TEXT:    This patient has (type, if known) dementia with behavioral disturbances.     Query created by: Andrew Mccauley on 2022 12:13 PM      Electronically signed by:  Ayden Mccollum MD 2022 12:39 PM

## 2022-02-01 ENCOUNTER — PATIENT OUTREACH (OUTPATIENT)
Dept: CASE MANAGEMENT | Age: 72
End: 2022-02-01

## 2022-02-01 NOTE — PROGRESS NOTES
Transitions of Care Call  Call within 2 business days of discharge: Yes     Patient: Pina Castillo Patient : 1950 MRN: [id number]    Last Discharge [address]       Complaint Diagnosis Description Type Department Provider    22 Altered mental status; Incontinence Acute UTI . .. ED to Hosp-Admission (Discharged) (ADMIT) Royce Mills MD; Docia Oyster. .. Was this an external facility discharge? No Discharge Facility: [address] [place] [address]    Challenges to be reviewed by the provider   -wife reports continued confusion. Concerned Trazadone may be worsening confusion. Encounter was routed to provider for escalation. Method of communication with provider: Chart route    Discussed COVID-19 related testing which was available at this time. Test results were negative. Patient informed of results, if available? yes. Current Symptoms:no covid symptoms noted    Reviewed New or Changed Meds: yes    Do you have what you need at home?  Durable Medical Equipment ordered at discharge: None   Home Health/Outpatient orders at discharge: none    Pulse oximeter? no Discussed and confirmed pulse oximeter discharge instructions and when to notify provider or seek emergency care. Patient education provided: Reviewed appropriate site of care based on symptoms and resources available to patient including: Specialist and When to call 911. Follow up appointment scheduled within 7 days of discharge: yes. If no appointment scheduled, scheduling offered: yes. Future Appointments   Date Time Provider Raad Albrecht   2022  1:15 PM Rhianna Quesada MD [facility] MAIN BS AMB   2022  9:45 AM Rhianna Quesada MD [facility] MAIN BS AMB       Interventions: Obtained and reviewed discharge summary and/or continuity of care documents  CTN reviewed discharge instructions, medical action plan and red flags with family who verbalized understanding. Provided contact information for future needs.  Plan for follow-up call in 5-7 days based on severity of symptoms and risk factors.   Plan for next call: PCP follow up     Nancy Cardenas RN

## 2022-02-02 ENCOUNTER — TELEPHONE (OUTPATIENT)
Dept: CASE MANAGEMENT | Age: 72
End: 2022-02-02

## 2022-02-02 ENCOUNTER — OFFICE VISIT (OUTPATIENT)
Dept: INTERNAL MEDICINE CLINIC | Age: 72
End: 2022-02-02
Payer: MEDICARE

## 2022-02-02 VITALS
SYSTOLIC BLOOD PRESSURE: 138 MMHG | DIASTOLIC BLOOD PRESSURE: 90 MMHG | TEMPERATURE: 97.4 F | HEART RATE: 86 BPM | RESPIRATION RATE: 18 BRPM | OXYGEN SATURATION: 98 %

## 2022-02-02 DIAGNOSIS — I10 PRIMARY HYPERTENSION: ICD-10-CM

## 2022-02-02 DIAGNOSIS — A41.9 SEPSIS WITH ACUTE RENAL FAILURE AND RENAL CORTICAL NECROSIS WITHOUT SEPTIC SHOCK, DUE TO UNSPECIFIED ORGANISM (HCC): Primary | ICD-10-CM

## 2022-02-02 DIAGNOSIS — R65.20 SEPSIS WITH ACUTE RENAL FAILURE AND RENAL CORTICAL NECROSIS WITHOUT SEPTIC SHOCK, DUE TO UNSPECIFIED ORGANISM (HCC): Primary | ICD-10-CM

## 2022-02-02 DIAGNOSIS — N17.1 SEPSIS WITH ACUTE RENAL FAILURE AND RENAL CORTICAL NECROSIS WITHOUT SEPTIC SHOCK, DUE TO UNSPECIFIED ORGANISM (HCC): Primary | ICD-10-CM

## 2022-02-02 DIAGNOSIS — N30.00 ACUTE CYSTITIS WITHOUT HEMATURIA: ICD-10-CM

## 2022-02-02 DIAGNOSIS — R73.02 IGT (IMPAIRED GLUCOSE TOLERANCE): ICD-10-CM

## 2022-02-02 DIAGNOSIS — E78.5 DYSLIPIDEMIA: ICD-10-CM

## 2022-02-02 DIAGNOSIS — S88.911D AMPUTATION OF BOTH LOWER EXTREMITIES, SUBSEQUENT ENCOUNTER (HCC): ICD-10-CM

## 2022-02-02 DIAGNOSIS — Z09 HOSPITAL DISCHARGE FOLLOW-UP: ICD-10-CM

## 2022-02-02 DIAGNOSIS — G24.01 TARDIVE DYSKINESIA: ICD-10-CM

## 2022-02-02 DIAGNOSIS — F02.818 DEMENTIA DUE TO ANOTHER GENERAL MEDICAL CONDITION, WITH BEHAVIORAL DISTURBANCE: ICD-10-CM

## 2022-02-02 DIAGNOSIS — S88.912D AMPUTATION OF BOTH LOWER EXTREMITIES, SUBSEQUENT ENCOUNTER (HCC): ICD-10-CM

## 2022-02-02 PROCEDURE — G8427 DOCREV CUR MEDS BY ELIG CLIN: HCPCS | Performed by: INTERNAL MEDICINE

## 2022-02-02 PROCEDURE — 1111F DSCHRG MED/CURRENT MED MERGE: CPT | Performed by: INTERNAL MEDICINE

## 2022-02-02 PROCEDURE — 99215 OFFICE O/P EST HI 40 MIN: CPT | Performed by: INTERNAL MEDICINE

## 2022-02-02 NOTE — PROGRESS NOTES
1. Have you been to the ER, urgent care clinic since your last visit? Hospitalized since your last visit? Yes When: 1-28-22 Where: [facility] Reason for visit: sepsis    2. Have you seen or consulted any other health care providers outside of the [address] [place] [address] since your last visit? Include any pap smears or colon screening.  No    Hospital follow up  Wants to discuss trazadone

## 2022-02-02 NOTE — PROGRESS NOTES
SPORTS MEDICINE AND PRIMARY CARE  Tori Rae MD, [address] [address]  Phone:  [phone]  Fax: [phone]       Chief Complaint   Patient presents with   [facility] Follow Up   . SUBJECTIVE:    Elda Varma is a 80 y.o. male *Patient returns today after admission on 01/28 and discharged on 01/31 with final diagnosis of sepsis due to UTI and leukocytosis. He had altered mental status that was initially felt to be delirium, but subsequently proved to be dementia. Head CT scan was no ischemia, hemorrhage or mass, with chronic encephalomalacia and postsurgical changes. He was also noted to have acute kidney injury and chronic kidney disease, tachycardia, amputation of both lower extremities, hypertension, dyslipidemia and severe protein caloric malnutrition and was seen in consultation by a registered nutritionist.  He presented with a three day history of increasing confusion, sleeping all day, intermittent agitation and poor appetite, decreased oral intake, urinary frequency, urinary incontinence. He had been taken off Risperdal about three days earlier, but on the 26th after the episodes he restarted it. The signs of symptoms of sepsis resolved and he was sent home with Omnicef, Trazodone and advised to continue Lipitor, Risperdal, Aricept and Flomax. This represents a transition of care service and nurse navigator yesterday performed outreach to the patient to complete medication reconciliation and assessment of his condition. He comes in today with his wife and is seen for follow up. Patient remains confused, although better with Risperdal and is seen for evaluation. She shows me a video of him just rocking in the chair just before he went to the hospital.  She apparently called us the day before she took him. [facility] is the closest hospital and that was the reason she went there.     **       Current Outpatient Medications   Medication Sig Dispense Refill    cefdinir (OMNICEF) 300 mg capsule Take 1 Capsule by mouth two (2) times a day for 11 days. 22 Capsule 0    atorvastatin (LIPITOR) 10 mg tablet Take 1 Tablet by mouth daily. 30 Tablet 11    risperiDONE (RisperDAL) 0.5 mg tablet Take 1 Tablet by mouth nightly. 30 Tablet 11    donepeziL (ARICEPT) 5 mg tablet Take 1 Tablet by mouth nightly. 30 Tablet 11    tamsulosin (FLOMAX) 0.4 mg capsule TAKE 1 CAPSULE BY MOUTH EVERY DAY (Patient taking differently: Take 0.4 mg by mouth nightly.  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    Pancreatic mass     Prediabetes     Renal failure     Dr Danya Andrade S/P colonoscopy 1-4-08 2/26/19    ryan repeat 5 yrs    UTI (urinary tract infection)     Wound, open      Past Surgical History:   Procedure Laterality Date    COLONOSCOPY N/A 8/7/2018    COLONOSCOPY performed by Evita Downs MD at [address] [place] [address]      bilateral amputation    HX UROLOGICAL      left orchiectomy    NEUROLOGICAL PROCEDURE UNLISTED  7/2004    aneursym    [place] ABDOMEN SURGERY PROC UNLISTED      colonoscopy    [place] CARDIAC SURG PROCEDURE UNLIST       No Known Allergies      REVIEW OF SYSTEMS:  General: negative for - chills or fever  ENT: negative for - headaches, nasal congestion or tinnitus  Respiratory: negative for - cough, hemoptysis, shortness of breath or wheezing  Cardiovascular : negative for - chest pain, edema, palpitations or shortness of breath  Gastrointestinal: negative for - abdominal pain, blood in stools, heartburn or nausea/vomiting  Genito-Urinary: no dysuria, trouble voiding, or hematuria  Musculoskeletal: negative for - gait disturbance, joint pain, joint stiffness or joint swelling  Neurological: no TIA or stroke symptoms  Hematologic: no bruises, no bleeding, no swollen glands  Integument: no lumps, mole changes, nail changes or rash  Endocrine: no malaise/lethargy or unexpected weight changes      Social History     Socioeconomic History    Marital status:    Tobacco Use    Smoking status: Former Smoker     Quit date: 2000     Years since quittin.5    Smokeless tobacco: Never Used   Vaping Use    Vaping Use: Never used   Substance and Sexual Activity    Alcohol use: Yes     Comment: ocassional, 3 wine coolers / year    Drug use: No    Sexual activity: Not Currently     Partners: Female     Family History   Problem Relation Age of Onset    No Known Problems Father        OBJECTIVE:    Visit Vitals  BP (!) 138/90   Pulse 86   Temp 97.4 °F (36.3 °C) (Oral)   Resp 18   SpO2 98%     CONSTITUTIONAL: well , well nourished, appears age appropriate  EYES: perrla, eom intact  ENMT:moist mucous membranes, pharynx clear  NECK: supple. Thyroid normal  RESPIRATORY: Chest: clear bilaterally   CARDIOVASCULAR: Heart: regular rate and rhythm  GASTROINTESTINAL: Abdomen: soft, bowel sounds active  HEMATOLOGIC: no pathological lymph nodes palpated  MUSCULOSKELETAL: Extremities: no edema, pulse 1+   INTEGUMENT: No unusual rashes or suspicious skin lesions noted. Nails appear normal.  NEUROLOGIC: non-focal exam   MENTAL STATUS: alert and oriented, appropriate affect           ASSESSMENT:  1. Sepsis with acute renal failure and renal cortical necrosis without septic shock, due to unspecified organism ([facility]    2. Amputation of both lower extremities, subsequent encounter ([facility]    3.  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, with behavioral disturbance ([facility]    4. Acute cystitis without hematuria    5. Tardive dyskinesia    6. Dyslipidemia    7. IGT (impaired glucose tolerance)    8. Primary hypertension      The episode of sepsis is related to Enterobacter cloacae UTI, one blood culture positive. He will complete the course of Omnicef. There is amputation of both lower extremities, which has been stable. Dementia actually had an exacerbation with some deliria when he first got to the hospital with the sepsis. He is now approaching baseline with dementia, but he is still not back to baseline as he is still more confused than he normally is. He has dyslipidemia, which is controlled. Impaired glucose tolerance will be monitored. Blood pressure control is at goal.    We encouraged fluids, particularly water, about four bottles a day, we suggest, to prevent recurrent hospitalization. He agrees with the plan and will return to see me in three to four months, sooner if needed. I have discussed the diagnosis with the patient and the intended plan as seen in the  Orders. The patient understands and agees with the plan. The patient has   received an after visit summary and questions were answered concerning  future plans  Patient labs and/or xrays were reviewed  Past records were reviewed. PLAN:  . No orders of the defined types were placed in this encounter. Follow-up and Dispositions    · Return in about 3 months (around 5/2/2022). ATTENTION:   This medical record was transcribed using an electronic medical records system. Although proofread, it may and can contain electronic and spelling errors. Other human spelling and other errors may be present. Corrections may be executed at a later time. Please feel free to contact us for any clarifications as needed.

## 2022-02-03 LAB
BACTERIA SPEC CULT: ABNORMAL
BACTERIA SPEC CULT: NORMAL
SERVICE CMNT-IMP: ABNORMAL
SERVICE CMNT-IMP: NORMAL

## 2022-02-08 ENCOUNTER — PATIENT OUTREACH (OUTPATIENT)
Dept: CASE MANAGEMENT | Age: 72
End: 2022-02-08

## 2022-02-09 NOTE — PROGRESS NOTES
Follow Up Call    Challenges to be reviewed by the provider   Additional needs identified to be addressed with provider: no         Encounter was not routed to provider for escalation. Method of communication with provider: none. Contacted the family by telephone to follow up after hospital visit. Status: improved  Interventions to address identified needs: Obtained and reviewed discharge summary and/or continuity of care documents    [facility] follow up appointment(s):   Future Appointments   Date Time Provider Raad Trena   4/13/2022  9:45 AM Mavis Zambrano MD [facility] MAIN [facility]     Non-[facility] follow up appointment(s): none   Follow up appointment completed? yes. Provided contact information for future needs. Plan for follow-up call in 7-10 days based on severity of symptoms and risk factors.   Plan for next call: follow up      Abby Sylvester RN

## 2022-02-22 ENCOUNTER — PATIENT OUTREACH (OUTPATIENT)
Dept: CASE MANAGEMENT | Age: 72
End: 2022-02-22

## 2022-02-23 ENCOUNTER — HOSPITAL ENCOUNTER (INPATIENT)
Age: 72
LOS: 4 days | Discharge: HOME | DRG: 871 | End: 2022-02-27
Attending: EMERGENCY MEDICINE | Admitting: STUDENT IN AN ORGANIZED HEALTH CARE EDUCATION/TRAINING PROGRAM
Payer: MEDICARE

## 2022-02-23 ENCOUNTER — APPOINTMENT (OUTPATIENT)
Dept: GENERAL RADIOLOGY | Age: 72
DRG: 871 | End: 2022-02-23
Attending: EMERGENCY MEDICINE
Payer: MEDICARE

## 2022-02-23 ENCOUNTER — APPOINTMENT (OUTPATIENT)
Dept: CT IMAGING | Age: 72
DRG: 871 | End: 2022-02-23
Attending: STUDENT IN AN ORGANIZED HEALTH CARE EDUCATION/TRAINING PROGRAM
Payer: MEDICARE

## 2022-02-23 ENCOUNTER — APPOINTMENT (OUTPATIENT)
Dept: CT IMAGING | Age: 72
DRG: 871 | End: 2022-02-23
Attending: EMERGENCY MEDICINE
Payer: MEDICARE

## 2022-02-23 DIAGNOSIS — N17.9 AKI (ACUTE KIDNEY INJURY) (HCC): ICD-10-CM

## 2022-02-23 DIAGNOSIS — G93.40 ACUTE ENCEPHALOPATHY: ICD-10-CM

## 2022-02-23 DIAGNOSIS — D72.825 BANDEMIA: ICD-10-CM

## 2022-02-23 DIAGNOSIS — R65.20 SEVERE SEPSIS (HCC): Primary | ICD-10-CM

## 2022-02-23 DIAGNOSIS — A41.9 SEVERE SEPSIS (HCC): Primary | ICD-10-CM

## 2022-02-23 DIAGNOSIS — R79.89 ELEVATED LACTIC ACID LEVEL: ICD-10-CM

## 2022-02-23 LAB
ALBUMIN SERPL-MCNC: 3.4 G/DL (ref 3.5–5)
ALBUMIN/GLOB SERPL: 0.7 {RATIO} (ref 1.1–2.2)
ALP SERPL-CCNC: 91 U/L (ref 45–117)
ALT SERPL-CCNC: 17 U/L (ref 12–78)
ANION GAP SERPL CALC-SCNC: 10 MMOL/L (ref 5–15)
AST SERPL-CCNC: 20 U/L (ref 15–37)
BASOPHILS # BLD: 0 K/UL (ref 0–0.1)
BASOPHILS NFR BLD: 0 % (ref 0–1)
BILIRUB SERPL-MCNC: 0.7 MG/DL (ref 0.2–1)
BUN SERPL-MCNC: 22 MG/DL (ref 6–20)
BUN/CREAT SERPL: 15 (ref 12–20)
CALCIUM SERPL-MCNC: 10 MG/DL (ref 8.5–10.1)
CHLORIDE SERPL-SCNC: 105 MMOL/L (ref 97–108)
CO2 SERPL-SCNC: 26 MMOL/L (ref 21–32)
CREAT SERPL-MCNC: 1.49 MG/DL (ref 0.7–1.3)
DIFFERENTIAL METHOD BLD: ABNORMAL
EOSINOPHIL # BLD: 0 K/UL (ref 0–0.4)
EOSINOPHIL NFR BLD: 0 % (ref 0–7)
ERYTHROCYTE [DISTWIDTH] IN BLOOD BY AUTOMATED COUNT: 19.5 % (ref 11.5–14.5)
FLUAV RNA SPEC QL NAA+PROBE: NOT DETECTED
FLUBV RNA SPEC QL NAA+PROBE: NOT DETECTED
GLOBULIN SER CALC-MCNC: 4.6 G/DL (ref 2–4)
GLUCOSE SERPL-MCNC: 113 MG/DL (ref 65–100)
HCT VFR BLD AUTO: 34.2 % (ref 36.6–50.3)
HGB BLD-MCNC: 11 G/DL (ref 12.1–17)
IMM GRANULOCYTES # BLD AUTO: 0 K/UL
IMM GRANULOCYTES NFR BLD AUTO: 0 %
LACTATE BLD-SCNC: 1.97 MMOL/L (ref 0.4–2)
LACTATE BLD-SCNC: 2.53 MMOL/L (ref 0.4–2)
LYMPHOCYTES # BLD: 0.6 K/UL (ref 0.8–3.5)
LYMPHOCYTES NFR BLD: 3 % (ref 12–49)
MCH RBC QN AUTO: 24.7 PG (ref 26–34)
MCHC RBC AUTO-ENTMCNC: 32.2 G/DL (ref 30–36.5)
MCV RBC AUTO: 76.7 FL (ref 80–99)
MONOCYTES # BLD: 0.8 K/UL (ref 0–1)
MONOCYTES NFR BLD: 4 % (ref 5–13)
MYELOCYTES NFR BLD MANUAL: 1 %
NEUTS BAND NFR BLD MANUAL: 18 % (ref 0–6)
NEUTS SEG # BLD: 18.9 K/UL (ref 1.8–8)
NEUTS SEG NFR BLD: 74 % (ref 32–75)
NRBC # BLD: 0 K/UL (ref 0–0.01)
NRBC BLD-RTO: 0 PER 100 WBC
PLATELET # BLD AUTO: 138 K/UL (ref 150–400)
POTASSIUM SERPL-SCNC: 4.1 MMOL/L (ref 3.5–5.1)
PROT SERPL-MCNC: 8 G/DL (ref 6.4–8.2)
RBC # BLD AUTO: 4.46 M/UL (ref 4.1–5.7)
RBC MORPH BLD: ABNORMAL
RBC MORPH BLD: ABNORMAL
SARS-COV-2, COV2: NOT DETECTED
SODIUM SERPL-SCNC: 141 MMOL/L (ref 136–145)
TROPONIN-HIGH SENSITIVITY: 7 NG/L (ref 0–76)
WBC # BLD AUTO: 20.5 K/UL (ref 4.1–11.1)

## 2022-02-23 PROCEDURE — 36415 COLL VENOUS BLD VENIPUNCTURE: CPT

## 2022-02-23 PROCEDURE — 99285 EMERGENCY DEPT VISIT HI MDM: CPT

## 2022-02-23 PROCEDURE — 74011000250 HC RX REV CODE- 250: Performed by: EMERGENCY MEDICINE

## 2022-02-23 PROCEDURE — 96361 HYDRATE IV INFUSION ADD-ON: CPT

## 2022-02-23 PROCEDURE — 84484 ASSAY OF TROPONIN QUANT: CPT

## 2022-02-23 PROCEDURE — 96374 THER/PROPH/DIAG INJ IV PUSH: CPT

## 2022-02-23 PROCEDURE — 83605 ASSAY OF LACTIC ACID: CPT

## 2022-02-23 PROCEDURE — 87040 BLOOD CULTURE FOR BACTERIA: CPT

## 2022-02-23 PROCEDURE — 74011250636 HC RX REV CODE- 250/636: Performed by: STUDENT IN AN ORGANIZED HEALTH CARE EDUCATION/TRAINING PROGRAM

## 2022-02-23 PROCEDURE — 65270000032 HC RM SEMIPRIVATE

## 2022-02-23 PROCEDURE — 74011250636 HC RX REV CODE- 250/636: Performed by: EMERGENCY MEDICINE

## 2022-02-23 PROCEDURE — 74011000250 HC RX REV CODE- 250: Performed by: STUDENT IN AN ORGANIZED HEALTH CARE EDUCATION/TRAINING PROGRAM

## 2022-02-23 PROCEDURE — 80053 COMPREHEN METABOLIC PANEL: CPT

## 2022-02-23 PROCEDURE — 93005 ELECTROCARDIOGRAM TRACING: CPT

## 2022-02-23 PROCEDURE — 74011250637 HC RX REV CODE- 250/637: Performed by: STUDENT IN AN ORGANIZED HEALTH CARE EDUCATION/TRAINING PROGRAM

## 2022-02-23 PROCEDURE — 74176 CT ABD & PELVIS W/O CONTRAST: CPT

## 2022-02-23 PROCEDURE — 71045 X-RAY EXAM CHEST 1 VIEW: CPT

## 2022-02-23 PROCEDURE — 51701 INSERT BLADDER CATHETER: CPT

## 2022-02-23 PROCEDURE — 87636 SARSCOV2 & INF A&B AMP PRB: CPT

## 2022-02-23 PROCEDURE — 70450 CT HEAD/BRAIN W/O DYE: CPT

## 2022-02-23 PROCEDURE — 85025 COMPLETE CBC W/AUTO DIFF WBC: CPT

## 2022-02-23 PROCEDURE — 94760 N-INVAS EAR/PLS OXIMETRY 1: CPT

## 2022-02-23 RX ORDER — ONDANSETRON 2 MG/ML
4 INJECTION INTRAMUSCULAR; INTRAVENOUS
Status: DISCONTINUED | OUTPATIENT
Start: 2022-02-23 | End: 2022-02-27 | Stop reason: HOSPADM

## 2022-02-23 RX ORDER — ATORVASTATIN CALCIUM 10 MG/1
10 TABLET, FILM COATED ORAL DAILY
Status: DISCONTINUED | OUTPATIENT
Start: 2022-02-24 | End: 2022-02-27 | Stop reason: HOSPADM

## 2022-02-23 RX ORDER — RISPERIDONE 0.25 MG/1
0.5 TABLET, FILM COATED ORAL
Status: DISCONTINUED | OUTPATIENT
Start: 2022-02-23 | End: 2022-02-27 | Stop reason: HOSPADM

## 2022-02-23 RX ORDER — SODIUM CHLORIDE 9 MG/ML
75 INJECTION, SOLUTION INTRAVENOUS CONTINUOUS
Status: DISCONTINUED | OUTPATIENT
Start: 2022-02-23 | End: 2022-02-26

## 2022-02-23 RX ORDER — ACETAMINOPHEN 325 MG/1
650 TABLET ORAL
Status: DISCONTINUED | OUTPATIENT
Start: 2022-02-23 | End: 2022-02-27 | Stop reason: HOSPADM

## 2022-02-23 RX ORDER — DONEPEZIL HYDROCHLORIDE 5 MG/1
5 TABLET, FILM COATED ORAL
Status: DISCONTINUED | OUTPATIENT
Start: 2022-02-23 | End: 2022-02-27 | Stop reason: HOSPADM

## 2022-02-23 RX ORDER — ONDANSETRON 4 MG/1
4 TABLET, ORALLY DISINTEGRATING ORAL
Status: DISCONTINUED | OUTPATIENT
Start: 2022-02-23 | End: 2022-02-27 | Stop reason: HOSPADM

## 2022-02-23 RX ORDER — TRAZODONE HYDROCHLORIDE 50 MG/1
50 TABLET ORAL
COMMUNITY
End: 2022-07-25

## 2022-02-23 RX ORDER — HEPARIN SODIUM 5000 [USP'U]/ML
5000 INJECTION, SOLUTION INTRAVENOUS; SUBCUTANEOUS EVERY 12 HOURS
Status: DISCONTINUED | OUTPATIENT
Start: 2022-02-24 | End: 2022-02-27 | Stop reason: HOSPADM

## 2022-02-23 RX ORDER — TAMSULOSIN HYDROCHLORIDE 0.4 MG/1
0.4 CAPSULE ORAL
Status: DISCONTINUED | OUTPATIENT
Start: 2022-02-23 | End: 2022-02-27 | Stop reason: HOSPADM

## 2022-02-23 RX ORDER — SODIUM CHLORIDE 0.9 % (FLUSH) 0.9 %
5-10 SYRINGE (ML) INJECTION AS NEEDED
Status: DISCONTINUED | OUTPATIENT
Start: 2022-02-23 | End: 2022-02-24 | Stop reason: SDUPTHER

## 2022-02-23 RX ORDER — ACETAMINOPHEN 650 MG/1
650 SUPPOSITORY RECTAL
Status: DISCONTINUED | OUTPATIENT
Start: 2022-02-23 | End: 2022-02-27 | Stop reason: HOSPADM

## 2022-02-23 RX ORDER — SODIUM CHLORIDE 0.9 % (FLUSH) 0.9 %
5-40 SYRINGE (ML) INJECTION AS NEEDED
Status: DISCONTINUED | OUTPATIENT
Start: 2022-02-23 | End: 2022-02-27 | Stop reason: HOSPADM

## 2022-02-23 RX ORDER — TAMSULOSIN HYDROCHLORIDE 0.4 MG/1
0.4 CAPSULE ORAL
COMMUNITY
End: 2022-06-07 | Stop reason: SDUPTHER

## 2022-02-23 RX ORDER — POLYETHYLENE GLYCOL 3350 17 G/17G
17 POWDER, FOR SOLUTION ORAL DAILY PRN
Status: DISCONTINUED | OUTPATIENT
Start: 2022-02-23 | End: 2022-02-27 | Stop reason: HOSPADM

## 2022-02-23 RX ORDER — ENOXAPARIN SODIUM 100 MG/ML
30 INJECTION SUBCUTANEOUS DAILY
Status: DISCONTINUED | OUTPATIENT
Start: 2022-02-24 | End: 2022-02-23

## 2022-02-23 RX ORDER — SODIUM CHLORIDE 0.9 % (FLUSH) 0.9 %
5-40 SYRINGE (ML) INJECTION EVERY 8 HOURS
Status: DISCONTINUED | OUTPATIENT
Start: 2022-02-23 | End: 2022-02-26

## 2022-02-23 RX ADMIN — ACETAMINOPHEN 650 MG: 325 TABLET ORAL at 20:29

## 2022-02-23 RX ADMIN — SODIUM CHLORIDE, PRESERVATIVE FREE 10 ML: 5 INJECTION INTRAVENOUS at 20:25

## 2022-02-23 RX ADMIN — RISPERIDONE 0.5 MG: 0.25 TABLET, FILM COATED ORAL at 22:05

## 2022-02-23 RX ADMIN — CEFTRIAXONE SODIUM 1 G: 1 INJECTION, POWDER, FOR SOLUTION INTRAMUSCULAR; INTRAVENOUS at 14:46

## 2022-02-23 RX ADMIN — SODIUM CHLORIDE 1000 ML: 9 INJECTION, SOLUTION INTRAVENOUS at 14:36

## 2022-02-23 RX ADMIN — SODIUM CHLORIDE 75 ML/HR: 9 INJECTION, SOLUTION INTRAVENOUS at 20:29

## 2022-02-23 RX ADMIN — SODIUM CHLORIDE 362 ML: 900 INJECTION, SOLUTION INTRAVENOUS at 15:07

## 2022-02-23 RX ADMIN — TAMSULOSIN HYDROCHLORIDE 0.4 MG: 0.4 CAPSULE ORAL at 22:05

## 2022-02-23 RX ADMIN — DONEPEZIL HYDROCHLORIDE 5 MG: 5 TABLET, FILM COATED ORAL at 22:05

## 2022-02-23 NOTE — ED PROVIDER NOTES
EMERGENCY DEPARTMENT HISTORY AND PHYSICAL EXAM      Date: 2/23/2022  Patient Name: Yenni Curtis    History of Presenting Illness     Chief Complaint   Patient presents with    Altered mental status     History Provided By: Patient and Patient's Wife    HPI: Yenni Curtis, 80 y.o. male with past medical history significant for dementia, hyperlipidemia, hypertension, and renal failure who presents via private vehicle to the ED with cc of altered mental status. Per wife, patient woke up altered today and was acting bizarre. Today he was incontinent of stool in the bed and he smeared all over the bed sheets. She states it took him approximately 4 hours to get himself cleaned up and ready to come to the hospital which is very abnormal for him (normally takes him approximately 45 minutes). She states he does not drink much water but does have a good appetite. She states last time he was like this he had a urinary tract infection and ended up in the hospital for a few days with dehydration. He denies any nausea, vomiting, shortness of breath, or chest pain. His wife called the primary care doctor who referred him to the emergency department for further evaluation. She denies any falls. PMHx: Dementia, hyperlipidemia, hypertension, and renal failure  Social Hx: Former smoker, rare alcohol use, denies illegal drug use    PCP: Annabel Caraballo MD    There are no other complaints, changes, or physical findings at this time. No current facility-administered medications on file prior to encounter. Current Outpatient Medications on File Prior to Encounter   Medication Sig Dispense Refill    atorvastatin (LIPITOR) 10 mg tablet Take 1 Tablet by mouth daily. 30 Tablet 11    risperiDONE (RisperDAL) 0.5 mg tablet Take 1 Tablet by mouth nightly. 30 Tablet 11    donepeziL (ARICEPT) 5 mg tablet Take 1 Tablet by mouth nightly.  30 Tablet 11    tamsulosin (FLOMAX) 0.4 mg capsule TAKE 1 CAPSULE BY MOUTH EVERY DAY (Patient taking differently: Take 0.4 mg by mouth nightly. 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 ([facility] [address] 2019    md curly    Dyslipidemia     Gangrene ([facility] [address]     bilateral amputation d/t gangrene    Hallucination     Hernia, inguinal, left 2019    Hypertension     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    Pancreatic mass     Prediabetes     Renal failure     Dr Bella Martin S/P colonoscopy 08    ryan repeat 5 yrs    UTI (urinary tract infection)     Wound, open      Past Surgical History:  Past Surgical History:   Procedure Laterality Date    COLONOSCOPY N/A 2018    COLONOSCOPY performed by Molly Howell MD at [address] [place] [address]      bilateral amputation    HX UROLOGICAL      left orchiectomy    NEUROLOGICAL PROCEDURE UNLISTED  2004    aneursym    [place] ABDOMEN SURGERY PROC UNLISTED      colonoscopy    [place] CARDIAC SURG PROCEDURE UNLIST       Family History:  Family History   Problem Relation Age of Onset    No Known Problems Father      Social History:  Social History     Tobacco Use    Smoking status: Former Smoker     Quit date: 2000     Years since quittin.5    Smokeless tobacco: Never Used   Vaping Use    Vaping Use: Never used   Substance Use Topics    Alcohol use: Yes     Comment: ocassional, 3 wine coolers / year    Drug use: No     Allergies:  No Known Allergies  Review of Systems   Review of Systems   Constitutional: Negative for chills and fever. HENT: Negative for congestion, rhinorrhea, sneezing and sore throat. Eyes: Negative for redness and visual disturbance.    Respiratory: Negative for shortness of breath. Cardiovascular: Negative for chest pain and leg swelling. Gastrointestinal: Negative for abdominal pain, nausea and vomiting. Genitourinary: Negative for difficulty urinating and frequency. Musculoskeletal: Negative for back pain, myalgias and neck stiffness. Skin: Negative for rash. Neurological: Positive for weakness. Negative for dizziness, syncope and headaches. Hematological: Negative for adenopathy. Psychiatric/Behavioral: Positive for confusion. All other systems reviewed and are negative. Physical Exam   Physical Exam  Vitals and nursing note reviewed. Constitutional:       Appearance: Normal appearance. He is well-developed and underweight. HENT:      Head: Normocephalic and atraumatic. Cardiovascular:      Rate and Rhythm: Regular rhythm. Tachycardia present. Pulses: Normal pulses. Heart sounds: Normal heart sounds. Pulmonary:      Effort: Pulmonary effort is normal. No respiratory distress. Breath sounds: Normal breath sounds. Chest:      Chest wall: No tenderness. Abdominal:      General: Bowel sounds are normal.      Palpations: Abdomen is soft. Tenderness: There is no abdominal tenderness. There is no guarding or rebound. Musculoskeletal:      Cervical back: Full passive range of motion without pain, normal range of motion and neck supple. Comments: Right AKA, left BKA   Skin:     General: Skin is warm and dry. Findings: No erythema or rash. Neurological:      Mental Status: He is alert and oriented to person, place, and time. He is confused. Comments: Slow to answer questions   Psychiatric:         Speech: Speech normal.         Behavior: Behavior is slowed. Thought Content:  Thought content normal.         Judgment: Judgment normal.       Diagnostic Study Results   Labs -     Recent Results (from the past 12 hour(s))   EKG, 12 LEAD, INITIAL    Collection Time: 02/23/22  2:04 PM   Result Value Ref Range    Ventricular Rate 126 BPM    Atrial Rate 126 BPM    P-R Interval 142 ms    QRS Duration 66 ms    Q-T Interval 304 ms    QTC Calculation (Bezet) 440 ms    Calculated P Axis 66 degrees    Calculated R Axis 53 degrees    Calculated T Axis 0 degrees    Diagnosis       Sinus tachycardia  When compared with ECG of 21-DEC-2012 06:31,  Vent. rate has increased BY  44 BPM     METABOLIC PANEL, COMPREHENSIVE    Collection Time: 02/23/22  2:07 PM   Result Value Ref Range    Sodium 141 136 - 145 mmol/L    Potassium 4.1 3.5 - 5.1 mmol/L    Chloride 105 97 - 108 mmol/L    CO2 26 21 - 32 mmol/L    Anion gap 10 5 - 15 mmol/L    Glucose 113 (H) 65 - 100 mg/dL    BUN 22 (H) 6 - 20 MG/DL    Creatinine 1.49 (H) 0.70 - 1.30 MG/DL    BUN/Creatinine ratio 15 12 - 20      GFR est AA 57 (L) >60 ml/min/1.73m2    GFR est non-AA 47 (L) >60 ml/min/1.73m2    Calcium 10.0 8.5 - 10.1 MG/DL    Bilirubin, total 0.7 0.2 - 1.0 MG/DL    ALT (SGPT) 17 12 - 78 U/L    AST (SGOT) 20 15 - 37 U/L    Alk. phosphatase 91 45 - 117 U/L    Protein, total 8.0 6.4 - 8.2 g/dL    Albumin 3.4 (L) 3.5 - 5.0 g/dL    Globulin 4.6 (H) 2.0 - 4.0 g/dL    A-G Ratio 0.7 (L) 1.1 - 2.2     CBC WITH AUTOMATED DIFF    Collection Time: 02/23/22  2:07 PM   Result Value Ref Range    WBC 20.5 (HH) 4.1 - 11.1 K/uL    RBC 4.46 4.10 - 5.70 M/uL    HGB 11.0 (L) 12.1 - 17.0 g/dL    HCT 34.2 (L) 36.6 - 50.3 %    MCV 76.7 (L) 80.0 - 99.0 FL    MCH 24.7 (L) 26.0 - 34.0 PG    MCHC 32.2 30.0 - 36.5 g/dL    RDW 19.5 (H) 11.5 - 14.5 %    PLATELET 609 (L) 030 - 400 K/uL    NRBC 0.0 0  WBC    ABSOLUTE NRBC 0.00 0.00 - 0.01 K/uL    NEUTROPHILS 74 32 - 75 %    BAND NEUTROPHILS 18 (H) 0 - 6 %    LYMPHOCYTES 3 (L) 12 - 49 %    MONOCYTES 4 (L) 5 - 13 %    EOSINOPHILS 0 0 - 7 %    BASOPHILS 0 0 - 1 %    MYELOCYTES 1 (H) 0 %    IMMATURE GRANULOCYTES 0 %    ABS. NEUTROPHILS 18.9 (H) 1.8 - 8.0 K/UL    ABS. LYMPHOCYTES 0.6 (L) 0.8 - 3.5 K/UL    ABS. MONOCYTES 0.8 0.0 - 1.0 K/UL    ABS. EOSINOPHILS 0.0 0.0 - 0.4 K/UL    ABS. BASOPHILS 0.0 0.0 - 0.1 K/UL    ABS. IMM. GRANS. 0.0 K/UL    DF MANUAL      RBC COMMENTS ANISOCYTOSIS  1+        RBC COMMENTS HYPOCHROMIA  PRESENT       TROPONIN-HIGH SENSITIVITY    Collection Time: 02/23/22  2:07 PM   Result Value Ref Range    Troponin-High Sensitivity 7 0 - 76 ng/L   POC LACTIC ACID    Collection Time: 02/23/22  2:22 PM   Result Value Ref Range    Lactic Acid (POC) 2.53 (HH) 0.40 - 2.00 mmol/L       Radiologic Studies -   CT HEAD WO CONT   Final Result   1. No evidence of intracranial hemorrhage. 2. Stable appearance of the previously described areas of encephalomalacic   change in the right frontal and occipital regions. 3. Evidence of postsurgical change involving the right side of the skull. Presence of the two previously described aneurysm clips. XR CHEST PORT   Final Result   1. No pneumonia        CT HEAD WO CONT    Result Date: 2/23/2022  1. No evidence of intracranial hemorrhage. 2. Stable appearance of the previously described areas of encephalomalacic change in the right frontal and occipital regions. 3. Evidence of postsurgical change involving the right side of the skull. Presence of the two previously described aneurysm clips. XR CHEST PORT    Result Date: 2/23/2022  1. No pneumonia    Medical Decision Making   I am the first provider for this patient. I reviewed the vital signs, available nursing notes, past medical history, past surgical history, family history and social history. Vital Signs-Reviewed the patient's vital signs.   Patient Vitals for the past 24 hrs:   Temp Pulse Resp BP SpO2   02/23/22 1515 -- (!) 131 29 -- 99 %   02/23/22 1500 -- -- -- (!) 110/92 --   02/23/22 1406 -- (!) 124 (!) 32 106/66 --   02/23/22 1352 -- -- -- 113/69 99 %   02/23/22 1327 99.3 °F (37.4 °C) (!) 125 18 125/64 100 %     Pulse Oximetry Analysis - 100% on RA (normal)    Cardiac Monitor:   Rate: 125 bpm  Rhythm: Sinus Tachycardia     ED EKG interpretation: 14:04  Rhythm: Sinus tachycardia; and regular . Rate (approx.): 126; Axis: normal; P wave: normal; QRS interval: normal ; ST/T wave: normal; Other findings: normal. This EKG was interpreted by Giselle Thapa MD,ED Provider. Records Reviewed: Nursing Notes, Old Medical Records, Previous Radiology Studies and Previous Laboratory Studies    Provider Notes (Medical Decision Making):   73-year-old male presents with increased confusion over the past few days and lethargy. He is tachycardic and has a low-grade fever. Suspect this is a another infectious etiology. Will initiate sepsis protocols and patient will likely need to be admitted to the hospital.    ED Course:   Initial assessment performed. The patients presenting problems have been discussed, and they are in agreement with the care plan formulated and outlined with them. I have encouraged them to ask questions as they arise throughout their visit. Progress Note  2:38 PM  I have received a critical value from the lab. Patient's lactic acid is 2.5 and his white count is 20.5. His chest x-ray is unremarkable. Still awaiting his metabolic panel and urinalysis. Will empirically order IV Rocephin for suspected urosepsis. Progress Note  4:12 PM  I have re-evaluated pt and nursing has performed a straight cath and he has no urine at this time. Will initiate empiric antibiotics and discuss with the hospitalist for admission. We will continue to try to obtain a urine sample. 4:19 PM  Jremaine Shipley MD spoke with Dr. Adilene Dumont, Consult for Hospitalist. Discussed HPI and PE, available diagnostic tests and clinical findings. She is in agreement with care plans as outlined. She agrees to admit the patient.     CRITICAL CARE NOTE :    4:25 PM    IMPENDING DETERIORATION -Cardiovascular, Metabolic and Renal    ASSOCIATED RISK FACTORS - Metabolic changes, Dehydration and CNS Decompensation    MANAGEMENT- Bedside Assessment    INTERPRETATION - Xrays, CT Scan, ECG and Blood Pressure    INTERVENTIONS - Metobolic interventions    CASE REVIEW - Hospitalist/Intensivist, Nursing and Family    TREATMENT RESPONSE -Unchanged     PERFORMED BY - Self    NOTES   :    I have spent 50 minutes of critical care time involved in lab review, consultations with specialist, family decision- making, bedside attention and documentation. During this entire length of time I was immediately available to the patient. Critical Care: The reason for providing this level of medical care for this critically ill patient was due to a critical illness that impaired one or more vital organ systems such that there was a high probability of imminent or life threatening deterioration in the patients condition. This care involved high complexity decision making to assess, manipulate, and support vital system functions. Erickson Chowdhury MD    Progress Note:   Updated pt on all returned results and findings. Discussed the importance of proper follow up as referred below along with return precautions. Pt in agreement with the care plan and expresses agreement with and understanding of all items discussed. Disposition:  ADMIT NOTE:  4:26 PM  The patient is being admitted to the hospital by Dr. Kristen Giraldo. The results of their tests and reasons for their admission have been discussed with the patient and/or available family. They convey agreement and understanding for the need to be admitted and for their admission diagnosis. PLAN:  1. Admit    Diagnosis     Clinical Impression:   1. Severe sepsis ([address]    2. Acute encephalopathy    3. ZEN (acute kidney injury) ([address]    4. Elevated lactic acid level    5. Bandemia            Please note that this dictation was completed with Dragon, computer voice recognition software. Quite often unanticipated grammatical, syntax, homophones, and other interpretive errors are inadvertently transcribed by the computer software.   Please disregard these errors. Additionally, please excuse any errors that have escaped final proofreading.

## 2022-02-23 NOTE — ED NOTES
This RN, with Acosta Farias RN assist, attempted straight cath but was unable to obtain urine sample. Pt did not tolerate cath well and asked for it to be removed. MD aware.

## 2022-02-23 NOTE — H&P
Hospitalist Admission Note    NAME: Yenni Curtis   :  1950   MRN:  [id number]   Room Number: WF39/15  @ [facility]     Date/Time:  2022 5:21 PM    Patient PCP: Annabel Caraballo MD  ______________________________________________________________________  Given the patient's current clinical presentation, I have a high level of concern for decompensation if discharged from the emergency department. Complex decision making was performed, which includes reviewing the patient's available past medical records, laboratory results, and x-ray films. My assessment of this patient's clinical condition and my plan of care is as follows. Assessment / Plan: Active Problems:    Sepsis ([address] (2022)      Severe sepsis POA  Leukocytosis POA  UA positive for infection. On admission,  bpm, WBC 20.5, Lactic acid 2.53. CXR interpreted indepedently - no acute process noted. - Start ceftriaxone  - Follow up UA, urine Cx, blood Cx  - Check CK and lactic acid   - IV hydration     Altered mental status POA  Dementia POA  Suspect due to sepsis and ZEN. Underlying dementia. CT Head interpreted independently - no acute ischemia, hemorrhage or mass. Chronic encephalomalacia.     - resume risperidone and donepezil.  - Frequent reorientation           ZEN POA on CKD POA   Baseline creatinine 1.2-1.3. Current BUN/Cr 34/1. 84. CT A/P showed no obstruction     - Strict Is and Os,avoid nephrotoxic meds, renally dose meds  - IV fluids            Tachycardia POA   noted on admission. Suspect due to sepsis and hypovolemia. EKG interpreted independently - sinus tachycardia. - Fluid repletion         Amputation of bilateral lower extremities POA        HTN POA  HLD POA  - resume home meds         Body mass index is 16.14 kg/m².    Malnutrition, severe, protein calorie     - RD consult        Code Status: full   Surrogate Decision Maker:  Wife      DVT Prophylaxis: Lovenox  GI Prophylaxis: not indicated  Baseline: ambulatory independently             Subjective:   CHIEF COMPLAINT: altered mental status     HISTORY OF PRESENT ILLNESS:     Kody Duncan is a 80 y.o.  male with PMH of Dementia, amputation, HLD, HTN who presents to ED with c/o altered mental status. Per wife, patient has been having delayed cognition, noted to be confused, having stool incontinence in bed and smeared bed sheets with stools. Patient has also had urinary incontinence over the past few days and wearing adult briefs. At baseline patient is wheelchair bound and needs some assistance with ADLs    We were asked to admit for work up and evaluation of the above problems.      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    Pancreatic mass     Prediabetes     Renal failure     Dr Blanca Number S/P colonoscopy 08    ryan repeat 5 yrs    UTI (urinary tract infection)     Wound, open         Past Surgical History:   Procedure Laterality Date    COLONOSCOPY N/A 2018    COLONOSCOPY performed by Marcellus Stewart MD at [address] [place] [address]      bilateral amputation    HX UROLOGICAL      left orchiectomy    NEUROLOGICAL PROCEDURE UNLISTED  2004    aneursym    CO ABDOMEN SURGERY PROC UNLISTED      colonoscopy    CO CARDIAC SURG PROCEDURE UNLIST         Social History     Tobacco Use    Smoking status: Former Smoker     Quit date: 2000     Years since quittin.5    Smokeless tobacco: Never Used   Substance Use Topics    Alcohol use: Yes     Comment: ocassional, 3 wine coolers / year        Family History   Problem Relation Age of Onset    No Known Problems Father      No Known Allergies     Prior to Admission medications    Medication Sig Start Date End Date Taking? Authorizing Provider   atorvastatin (LIPITOR) 10 mg tablet Take 1 Tablet by mouth daily. 9/13/21  Yes Megan Anguiano MD   risperiDONE (RisperDAL) 0.5 mg tablet Take 1 Tablet by mouth nightly. 6/30/21  Yes Megan Anguiano MD   donepeziL (ARICEPT) 5 mg tablet Take 1 Tablet by mouth nightly. 6/30/21  Yes Megan Anguiano MD   tamsulosin (FLOMAX) 0.4 mg capsule TAKE 1 CAPSULE BY MOUTH EVERY DAY  Patient taking differently: Take 0.4 mg by mouth nightly. TAKE 1 CAPSULE BY MOUTH EVERY DAY (nightly) 4/28/21  Yes Megan Anguiano MD       REVIEW OF SYSTEMS:     I am not able to complete the review of systems because:    The patient is intubated and sedated    The patient has altered mental status due to his acute medical problems    The patient has baseline aphasia from prior stroke(s)   x The patient has baseline dementia and is not reliable historian    The patient is in acute medical distress and unable to provide information           Total of 12 systems reviewed as follows:       POSITIVE= underlined text  Negative = text not underlined  General:  fever, chills, sweats, generalized weakness, weight loss/gain,      loss of appetite   Eyes:    blurred vision, eye pain, loss of vision, double vision  ENT:    rhinorrhea, pharyngitis   Respiratory:   cough, sputum production, SOB, DUNN, wheezing, pleuritic pain   Cardiology:   chest pain, palpitations, orthopnea, PND, edema, syncope   Gastrointestinal:  abdominal pain , N/V, diarrhea, dysphagia, constipation, bleeding   Genitourinary:  frequency, urgency, dysuria, hematuria, incontinence   Muskuloskeletal :  arthralgia, myalgia, back pain  Hematology:  easy bruising, nose or gum bleeding, lymphadenopathy   Dermatological: rash, ulceration, pruritis, color change / jaundice  Endocrine:   hot flashes or polydipsia   Neurological:  headache, dizziness, confusion, focal weakness, paresthesia,     Speech difficulties, memory loss, gait difficulty  Psychological: Feelings of anxiety, depression, agitation    Objective:   VITALS:    Visit Vitals  /82   Pulse (!) 128   Temp 99.3 °F (37.4 °C)   Resp 25   Ht 5' 6\" (1.676 m)   Wt 45.4 kg (100 lb)   SpO2 99%   BMI 16.14 kg/m²       PHYSICAL EXAM:    General:    Alert, cooperative, no distress, appears stated age. HEENT: Atraumatic, anicteric sclerae, pink conjunctivae     No oral ulcers, mucosa moist, throat clear, dentition fair  Neck:  Supple, symmetrical,  thyroid: non tender  Lungs:   Clear to auscultation bilaterally. No Wheezing or Rhonchi. No rales. Chest wall:  No tenderness  No Accessory muscle use. Heart:   Regular  rhythm,  No  murmur   No edema  Abdomen:   Soft, non-tender. Not distended. Bowel sounds normal  Extremities: No cyanosis. No clubbing,      Skin turgor normal, Capillary refill normal, Radial dial pulse 2+  Skin:     Not pale. Not Jaundiced  No rashes   Psych:  Good insight. Not depressed. Not anxious or agitated. Neurologic: EOMs intact. No facial asymmetry. No aphasia or slurred speech. Symmetrical strength, Sensation grossly intact.  Alert and oriented X 3.     ______________________________________________________________________    Care Plan discussed with:  Patient/Family, Nurse and     Expected  Disposition:  Home w/Family  ________________________________________________________________________  TOTAL TIME:  28 Minutes    Critical Care Provided     Minutes non procedure based      Comments    x Reviewed previous records   >50% of visit spent in counseling and coordination of care x Discussion with patient and/or family and questions answered ________________________________________________________________________  Signed: Dario Campbell MD    Procedures: see electronic medical records for all procedures/Xrays and details which were not copied into this note but were reviewed prior to creation of Plan. LAB DATA REVIEWED:    Recent Results (from the past 24 hour(s))   EKG, 12 LEAD, INITIAL    Collection Time: 02/23/22  2:04 PM   Result Value Ref Range    Ventricular Rate 126 BPM    Atrial Rate 126 BPM    P-R Interval 142 ms    QRS Duration 66 ms    Q-T Interval 304 ms    QTC Calculation (Bezet) 440 ms    Calculated P Axis 66 degrees    Calculated R Axis 53 degrees    Calculated T Axis 0 degrees    Diagnosis       Sinus tachycardia  When compared with ECG of 21-DEC-2012 06:31,  Vent. rate has increased BY  44 BPM     METABOLIC PANEL, COMPREHENSIVE    Collection Time: 02/23/22  2:07 PM   Result Value Ref Range    Sodium 141 136 - 145 mmol/L    Potassium 4.1 3.5 - 5.1 mmol/L    Chloride 105 97 - 108 mmol/L    CO2 26 21 - 32 mmol/L    Anion gap 10 5 - 15 mmol/L    Glucose 113 (H) 65 - 100 mg/dL    BUN 22 (H) 6 - 20 MG/DL    Creatinine 1.49 (H) 0.70 - 1.30 MG/DL    BUN/Creatinine ratio 15 12 - 20      GFR est AA 57 (L) >60 ml/min/1.73m2    GFR est non-AA 47 (L) >60 ml/min/1.73m2    Calcium 10.0 8.5 - 10.1 MG/DL    Bilirubin, total 0.7 0.2 - 1.0 MG/DL    ALT (SGPT) 17 12 - 78 U/L    AST (SGOT) 20 15 - 37 U/L    Alk.  phosphatase 91 45 - 117 U/L    Protein, total 8.0 6.4 - 8.2 g/dL    Albumin 3.4 (L) 3.5 - 5.0 g/dL    Globulin 4.6 (H) 2.0 - 4.0 g/dL    A-G Ratio 0.7 (L) 1.1 - 2.2     CBC WITH AUTOMATED DIFF    Collection Time: 02/23/22  2:07 PM   Result Value Ref Range    WBC 20.5 (HH) 4.1 - 11.1 K/uL    RBC 4.46 4.10 - 5.70 M/uL    HGB 11.0 (L) 12.1 - 17.0 g/dL    HCT 34.2 (L) 36.6 - 50.3 %    MCV 76.7 (L) 80.0 - 99.0 FL    MCH 24.7 (L) 26.0 - 34.0 PG    MCHC 32.2 30.0 - 36.5 g/dL    RDW 19.5 (H) 11.5 - 14.5 %    PLATELET 821 (L) 852 - 400 K/uL    NRBC 0.0 0  WBC    ABSOLUTE NRBC 0.00 0.00 - 0.01 K/uL    NEUTROPHILS 74 32 - 75 %    BAND NEUTROPHILS 18 (H) 0 - 6 %    LYMPHOCYTES 3 (L) 12 - 49 %    MONOCYTES 4 (L) 5 - 13 %    EOSINOPHILS 0 0 - 7 %    BASOPHILS 0 0 - 1 %    MYELOCYTES 1 (H) 0 %    IMMATURE GRANULOCYTES 0 %    ABS. NEUTROPHILS 18.9 (H) 1.8 - 8.0 K/UL    ABS. LYMPHOCYTES 0.6 (L) 0.8 - 3.5 K/UL    ABS. MONOCYTES 0.8 0.0 - 1.0 K/UL    ABS. EOSINOPHILS 0.0 0.0 - 0.4 K/UL    ABS. BASOPHILS 0.0 0.0 - 0.1 K/UL    ABS. IMM.  GRANS. 0.0 K/UL    DF MANUAL      RBC COMMENTS ANISOCYTOSIS  1+        RBC COMMENTS HYPOCHROMIA  PRESENT       TROPONIN-HIGH SENSITIVITY    Collection Time: 02/23/22  2:07 PM   Result Value Ref Range    Troponin-High Sensitivity 7 0 - 76 ng/L   POC LACTIC ACID    Collection Time: 02/23/22  2:22 PM   Result Value Ref Range    Lactic Acid (POC) 2.53 (HH) 0.40 - 2.00 mmol/L   POC LACTIC ACID    Collection Time: 02/23/22  4:52 PM   Result Value Ref Range    Lactic Acid (POC) 1.97 0.40 - 2.00 mmol/L

## 2022-02-23 NOTE — ED NOTES
Pt presents to ED via wheelchair complaining of altered mental status. Per wife, pt has been taking significantly longer with ADLs and has delayed response time. Pt has hx of UTIs and wife reports pt has been frequently incontinent of urine so he has been wearing adult briefs. Pt has right above the knee amputation, left below the knee amputation, and uses a wheelchair to get around at home. Pt is alert and oriented x 4, RR even and unlabored. Assessment completed and pt updated on plan of care. Call bell in reach. Emergency Department Nursing Plan of Care       The Nursing Plan of Care is developed from the Nursing assessment and Emergency Department Attending provider initial evaluation. The plan of care may be reviewed in the ED Provider note.     The Plan of Care was developed with the following considerations:   Patient / Family readiness to learn indicated by:verbalized understanding  Persons(s) to be included in education: patient and family  Barriers to Learning/Limitations:No    Signed     April Kan    2/23/2022   2:49 PM

## 2022-02-23 NOTE — PROGRESS NOTES
[facility] Dosing Services: Anticoagulation    Enoxaparin has been changed to heparin for a weight less than 60kg. Indication: prophylaxis of  DVT. Wt Readings from Last 1 Encounters:   02/23/22 45.4 kg (100 lb)       Ht Readings from Last 1 Encounters:   02/23/22 167.6 cm (66\")         Plan:  Enoxaparin changed to heparin 5,000 units subcutaneously every 12 hours. Previous  Regimen Enoxaparin 30 mg subcutaneously daily   Creatinine Clearance Estimated Creatinine Clearance: 29.6 mL/min (A) (based on SCr of 1.49 mg/dL (H)). Creatinine Lab Results   Component Value Date/Time    Creatinine 1.49 (H) 02/23/2022 02:07 PM       Platelet Lab Results   Component Value Date/Time    PLATELET 591 (L) 73/83/2936 02:07 PM      H/H Lab Results   Component Value Date/Time    HGB 11.0 (L) 02/23/2022 02:07 PM        Epidural Catheter: None  Other anticoagulants: None  Relevant drug interactions: None      Pharmacy to monitor patients progress. Will communicate further recommendations regarding patients anticoagulation therapy with prescriber.   Thank you,     Charo Booker, PharmD   Contact: [phone]

## 2022-02-23 NOTE — ED NOTES
TRANSFER - OUT REPORT:    Verbal report given to Boom Crenshaw RN (name) on Kelly Malcolm  being transferred to [facility] (unit) 213 for routine progression of care       Report consisted of patients Situation, Background, Assessment and   Recommendations(SBAR). Information from the following report(s) SBAR, Kardex, ED Summary, Procedure Summary, MAR and Recent Results was reviewed with the receiving nurse. Lines:   Peripheral IV 02/23/22 Left Hand (Active)   Site Assessment Clean, dry, & intact 02/23/22 1426   Phlebitis Assessment 0 02/23/22 1426   Infiltration Assessment 0 02/23/22 1426   Dressing Status Clean, dry, & intact 02/23/22 1426   Dressing Type Tape;Transparent 02/23/22 1426   Hub Color/Line Status Blue;Flushed;Patent; Infusing 02/23/22 1426   Action Taken Blood drawn 02/23/22 1426        Opportunity for questions and clarification was provided.       Patient transported with:   Monitor  Registered Nurse

## 2022-02-23 NOTE — ED TRIAGE NOTES
Pt reports increased confusion, stating patient was recently here and had a uti and elevated white count.  Pt denies pain at this time

## 2022-02-23 NOTE — PROGRESS NOTES
[id number]) .. TRANSFER - IN REPORT:    Verbal report received from [facility] RN(name) on Tammy Hernandez  being received from ED (unit) for routine progression of care      Report consisted of patients Situation, Background, Assessment and   Recommendations(SBAR). Information from the following report(s) SBAR, Kardex, MAR, Accordion, Recent Results and Cardiac Rhythm ST was reviewed with the receiving nurse. Opportunity for questions and clarification was provided. Assessment completed upon patients arrival to unit and care assumed.

## 2022-02-23 NOTE — PROGRESS NOTES
1735- Patient brought to room 213 from room ED 13. Giana Conn RN in room to introduce self to patient. Cyril Williamson assisted writer to perform skin check. Patient resistant to laying supine for skin check. Assessment limited due to patient's limited cooperation. Patient unable to verbalize reason he is unable to lay flat. Writer able to remove soiled bed bad that was soaked with urine. Skin appears intact without signs of irritation on ayse area. Vitals obtained and then call from CT to ask for patient to be brought down to CT scan for STATE exam.     [id number]: Giana Conn RN assisted writer to being patient via bed to CT scan. Upon transfer of patient to CT scan table via slideboard, patient unwilling to lay flat. Patient became irritable and pushing at [facility] when attempting to assist him. Giana Conn called patient's wife who wished us to complete the exam and do what is needed to do so. Giana Conn attempted 10 minutes of gentle verbal discussion with patient to explain the purpose of test and the reason it is needed and patient does not show sign of understanding, only yelling that he is tired of laying down and yelling at us to not force him to do anything. He is unable to verbalize where he is or why he is in the hospital. Writer and Giana Conn able to assist patient to safe position and secured with lap strap. Giana Conn stayed wuith patient during exam wearing p[rotective lead and gently talking with patient . 1832-Returned to room 213 from CT scan. Patient's bed locked, low position and bed alarm set. Patient sitting upright in bed. IV in left hand flushed and NS running at 75 ml per hour. Writer obtained COVID swab , sent to LAB by Micki Jimenez LPN.

## 2022-02-23 NOTE — PROGRESS NOTES
Reason for Readmission:    SEPSIS         RUR Score/Risk Level:     TBD    PCP: First and Last name:  Jere Bagley   Name of Practice: [facility] Medicine   Are you a current patient: Yes/No: Yes   Approximate date of last visit: 2/2/22   Can you participate in a virtual visit with your PCP:  No    Is a Care Conference indicated: Indicated      Did you attend your follow up appointment (s): If not, why not:  Yes, patient kept appointment. Resources/supports as identified by patient/family:          Top Challenges facing patient (as identified by patient/family and CM): Finances/Medication cost?     none  Transportation      Wife  Support system or lack thereof? Wife and daughter  Living arrangements? Daughter       Self-care/ADLs/Cognition? Independent per wife. Current Advanced Directive/Advance Care Plan:  Full Code - No ACP Documents in chart. Pt identified his wife, Parker Briones [[phone]] as his Primary Healthcare Decision Maker. Plan for utilizing home health:   None             Transition of Care Plan:    Based on readmission, the patient's previous Plan of Care   has been evaluated and/or modified. The current Transition of Care Plan is:            CM reviewed chart. CM completed assessment with pt at bedside. Pt wife answered all question. CM introduced self/role, verified demographics, and discussed discharge planning. Per wife no changes since last admission. lives with his wife, Otoniel Shahid, and her daughter. [employer] Medicare Part [place]B. Pt reported his wife would transport him home at D/C and to follow-up appointments. She stated he has gotten more confuse within this past week, she spoke with PCP office encourage her to bring patient to ER and also encourage fluid intake for patient. Discuss Medicaid as an option for personal aid as wife is the caretaker for patient. On discharge she will take patient home.     Amarilys Werner CM

## 2022-02-24 LAB
ANION GAP SERPL CALC-SCNC: 9 MMOL/L (ref 5–15)
APPEARANCE UR: ABNORMAL
ATRIAL RATE: 126 BPM
BACTERIA URNS QL MICRO: NEGATIVE /HPF
BASOPHILS # BLD: 0 K/UL (ref 0–0.1)
BASOPHILS NFR BLD: 0 % (ref 0–1)
BILIRUB UR QL: NEGATIVE
BUN SERPL-MCNC: 22 MG/DL (ref 6–20)
BUN/CREAT SERPL: 14 (ref 12–20)
CALCIUM SERPL-MCNC: 9.2 MG/DL (ref 8.5–10.1)
CALCULATED P AXIS, ECG09: 66 DEGREES
CALCULATED R AXIS, ECG10: 53 DEGREES
CALCULATED T AXIS, ECG11: 0 DEGREES
CHLORIDE SERPL-SCNC: 109 MMOL/L (ref 97–108)
CO2 SERPL-SCNC: 26 MMOL/L (ref 21–32)
COLOR UR: ABNORMAL
CREAT SERPL-MCNC: 1.6 MG/DL (ref 0.7–1.3)
DIAGNOSIS, 93000: NORMAL
DIFFERENTIAL METHOD BLD: ABNORMAL
EOSINOPHIL # BLD: 0 K/UL (ref 0–0.4)
EOSINOPHIL NFR BLD: 0 % (ref 0–7)
EPITH CASTS URNS QL MICRO: ABNORMAL /LPF
ERYTHROCYTE [DISTWIDTH] IN BLOOD BY AUTOMATED COUNT: 15.1 % (ref 11.5–14.5)
GLUCOSE SERPL-MCNC: 150 MG/DL (ref 65–100)
GLUCOSE UR STRIP.AUTO-MCNC: NEGATIVE MG/DL
HCT VFR BLD AUTO: 32 % (ref 36.6–50.3)
HGB BLD-MCNC: 10.8 G/DL (ref 12.1–17)
HGB UR QL STRIP: ABNORMAL
IMM GRANULOCYTES # BLD AUTO: 0 K/UL
IMM GRANULOCYTES NFR BLD AUTO: 0 %
KETONES UR QL STRIP.AUTO: NEGATIVE MG/DL
LEUKOCYTE ESTERASE UR QL STRIP.AUTO: ABNORMAL
LYMPHOCYTES # BLD: 3.5 K/UL (ref 0.8–3.5)
LYMPHOCYTES NFR BLD: 12 % (ref 12–49)
MAGNESIUM SERPL-MCNC: 1.5 MG/DL (ref 1.6–2.4)
MCH RBC QN AUTO: 24.8 PG (ref 26–34)
MCHC RBC AUTO-ENTMCNC: 33.8 G/DL (ref 30–36.5)
MCV RBC AUTO: 73.4 FL (ref 80–99)
MONOCYTES # BLD: 2.1 K/UL (ref 0–1)
MONOCYTES NFR BLD: 7 % (ref 5–13)
NEUTS BAND NFR BLD MANUAL: 24 % (ref 0–6)
NEUTS SEG # BLD: 23.8 K/UL (ref 1.8–8)
NEUTS SEG NFR BLD: 57 % (ref 32–75)
NITRITE UR QL STRIP.AUTO: NEGATIVE
NRBC # BLD: 0 K/UL (ref 0–0.01)
NRBC BLD-RTO: 0 PER 100 WBC
P-R INTERVAL, ECG05: 142 MS
PH UR STRIP: 5 [PH] (ref 5–8)
PHOSPHATE SERPL-MCNC: 2.9 MG/DL (ref 2.6–4.7)
PLATELET # BLD AUTO: 141 K/UL (ref 150–400)
PMV BLD AUTO: 10.6 FL (ref 8.9–12.9)
POTASSIUM SERPL-SCNC: 4 MMOL/L (ref 3.5–5.1)
PROT UR STRIP-MCNC: 30 MG/DL
Q-T INTERVAL, ECG07: 304 MS
QRS DURATION, ECG06: 66 MS
QTC CALCULATION (BEZET), ECG08: 440 MS
RBC # BLD AUTO: 4.36 M/UL (ref 4.1–5.7)
RBC #/AREA URNS HPF: ABNORMAL /HPF (ref 0–5)
RBC MORPH BLD: ABNORMAL
SODIUM SERPL-SCNC: 144 MMOL/L (ref 136–145)
SP GR UR REFRACTOMETRY: 1.02 (ref 1–1.03)
UA: UC IF INDICATED,UAUC: ABNORMAL
UROBILINOGEN UR QL STRIP.AUTO: 0.2 EU/DL (ref 0.2–1)
VENTRICULAR RATE, ECG03: 126 BPM
WBC # BLD AUTO: 29.4 K/UL (ref 4.1–11.1)
WBC URNS QL MICRO: ABNORMAL /HPF (ref 0–4)

## 2022-02-24 PROCEDURE — 85025 COMPLETE CBC W/AUTO DIFF WBC: CPT

## 2022-02-24 PROCEDURE — 87077 CULTURE AEROBIC IDENTIFY: CPT

## 2022-02-24 PROCEDURE — 94760 N-INVAS EAR/PLS OXIMETRY 1: CPT

## 2022-02-24 PROCEDURE — 36415 COLL VENOUS BLD VENIPUNCTURE: CPT

## 2022-02-24 PROCEDURE — 80048 BASIC METABOLIC PNL TOTAL CA: CPT

## 2022-02-24 PROCEDURE — 84100 ASSAY OF PHOSPHORUS: CPT

## 2022-02-24 PROCEDURE — 87186 SC STD MICRODIL/AGAR DIL: CPT

## 2022-02-24 PROCEDURE — 87086 URINE CULTURE/COLONY COUNT: CPT

## 2022-02-24 PROCEDURE — 74011000250 HC RX REV CODE- 250: Performed by: STUDENT IN AN ORGANIZED HEALTH CARE EDUCATION/TRAINING PROGRAM

## 2022-02-24 PROCEDURE — 74011250636 HC RX REV CODE- 250/636: Performed by: STUDENT IN AN ORGANIZED HEALTH CARE EDUCATION/TRAINING PROGRAM

## 2022-02-24 PROCEDURE — 65270000032 HC RM SEMIPRIVATE

## 2022-02-24 PROCEDURE — 74011250637 HC RX REV CODE- 250/637: Performed by: STUDENT IN AN ORGANIZED HEALTH CARE EDUCATION/TRAINING PROGRAM

## 2022-02-24 PROCEDURE — 74011000258 HC RX REV CODE- 258: Performed by: STUDENT IN AN ORGANIZED HEALTH CARE EDUCATION/TRAINING PROGRAM

## 2022-02-24 PROCEDURE — 83735 ASSAY OF MAGNESIUM: CPT

## 2022-02-24 PROCEDURE — 81001 URINALYSIS AUTO W/SCOPE: CPT

## 2022-02-24 RX ORDER — LANOLIN ALCOHOL/MO/W.PET/CERES
800 CREAM (GRAM) TOPICAL ONCE
Status: COMPLETED | OUTPATIENT
Start: 2022-02-24 | End: 2022-02-24

## 2022-02-24 RX ADMIN — MAGNESIUM OXIDE 400 MG (241.3 MG MAGNESIUM) TABLET 800 MG: TABLET at 11:39

## 2022-02-24 RX ADMIN — TAMSULOSIN HYDROCHLORIDE 0.4 MG: 0.4 CAPSULE ORAL at 21:41

## 2022-02-24 RX ADMIN — SODIUM CHLORIDE, PRESERVATIVE FREE 10 ML: 5 INJECTION INTRAVENOUS at 21:42

## 2022-02-24 RX ADMIN — DONEPEZIL HYDROCHLORIDE 5 MG: 5 TABLET, FILM COATED ORAL at 21:41

## 2022-02-24 RX ADMIN — CEFEPIME HYDROCHLORIDE 2 G: 2 INJECTION, POWDER, FOR SOLUTION INTRAVENOUS at 12:31

## 2022-02-24 RX ADMIN — RISPERIDONE 0.5 MG: 0.25 TABLET, FILM COATED ORAL at 21:41

## 2022-02-24 RX ADMIN — ATORVASTATIN CALCIUM 10 MG: 10 TABLET ORAL at 09:44

## 2022-02-24 RX ADMIN — SODIUM CHLORIDE 75 ML/HR: 9 INJECTION, SOLUTION INTRAVENOUS at 19:44

## 2022-02-24 RX ADMIN — HEPARIN SODIUM 5000 UNITS: 5000 INJECTION, SOLUTION INTRAVENOUS; SUBCUTANEOUS at 09:44

## 2022-02-24 RX ADMIN — SODIUM CHLORIDE, PRESERVATIVE FREE 10 ML: 5 INJECTION INTRAVENOUS at 16:56

## 2022-02-24 RX ADMIN — HEPARIN SODIUM 5000 UNITS: 5000 INJECTION, SOLUTION INTRAVENOUS; SUBCUTANEOUS at 21:41

## 2022-02-24 NOTE — PROGRESS NOTES
Hospitalist Progress Note    NAME: Megan Low   :  1950   MRN:  [id number]   Room Number:  604/46  @ [facility]       Interim Hospital Summary: 80 y.o. male whom presented on 2022 with      Assessment / Plan:         Severe sepsis POA  Leukocytosis POA  UA positive for infection. On admission,  bpm, WBC 20.5, Lactic acid 2.53. CXR interpreted indepedently - no acute process noted.      - Switch abx to Cefepime  - Follow up UA, urine Cx, blood Cx  - Check CK and lactic acid   - IV hydration     Altered mental status POA  Dementia POA  Suspect due to sepsis and ZEN. Underlying dementia. CT Head interpreted independently - no acute ischemia, hemorrhage or mass. Chronic encephalomalacia.     - resume risperidone and donepezil.  - Frequent reorientation           ZEN POA on CKD POA   Baseline creatinine 1.2-1.3. Current BUN/Cr 34/1. 84. CT A/P showed no obstruction     - Strict Is and Os,avoid nephrotoxic meds, renally dose meds  - IV fluids            Tachycardia POA   noted on admission. Suspect due to sepsis and hypovolemia. EKG interpreted independently - sinus tachycardia.      - Fluid repletion         Amputation of bilateral lower extremities POA        HTN POA  HLD POA  - resume home meds         Body mass index is 16.14 kg/m². Malnutrition, severe, protein calorie     - RD consult         Code Status: full   Surrogate Decision Maker:  Wife      DVT Prophylaxis: Lovenox  GI Prophylaxis: not indicated  Baseline: ambulatory independently                 Subjective:     Chief Complaint / Reason for Physician Visit  \"im ok\". Discussed with RN events overnight. Review of Systems:  No fevers, chills, appetite change, cough, sputum production, shortness of breath, dyspnea on exertion, nausea, vomitting, diarrhea, constipation, chest pain, leg edema, abdominal pain, joint pain, rash, itching. Tolerating PT/OT. Tolerating diet.        Objective:     VITALS:   Last 24hrs VS reviewed since prior progress note. Most recent are:  Patient Vitals for the past 24 hrs:   Temp Pulse Resp BP SpO2   02/24/22 1136 (!) 96 °F (35.6 °C) 90 16 133/79 99 %   02/24/22 0721 98.7 °F (37.1 °C) 97 16 133/80 99 %   02/24/22 0516 98.9 °F (37.2 °C) 100 18 (!) 110/90 99 %   02/24/22 0004 98.9 °F (37.2 °C) (!) 104 18 (!) 118/94 99 %   02/23/22 2000 100.4 °F (38 °C) (!) 117 18 129/83 100 %   02/23/22 1757 98.3 °F (36.8 °C) (!) 115 22 120/75 --   02/23/22 1700 -- (!) 128 25 131/82 --   02/23/22 1600 -- (!) 132 30 135/76 --   02/23/22 1515 -- (!) 131 29 -- 99 %   02/23/22 1500 -- -- -- Becky [place] 110/92 --       Intake/Output Summary (Last 24 hours) at 2/24/2022 1414  Last data filed at 2/24/2022 1141  Gross per 24 hour   Intake 3282 ml   Output 1100 ml   Net 2182 ml        PHYSICAL EXAM:  General: WD, WN. Alert, cooperative, no acute distress    EENT:  EOMI. Anicteric sclerae. MMM  Resp:  CTA bilaterally, no wheezing or rales. No accessory muscle use  CV:  Regular  rhythm,  normal S1/S2, no murmurs rubs gallops, No edema  GI:  Soft, Non distended, Non tender. +Bowel sounds  Neurologic:  Alert and oriented X self, normal speech,   Psych:   Poor insight. Not anxious nor agitated  Skin:  No rashes. No jaundice    Reviewed most current lab test results and cultures  YES  Reviewed most current radiology test results   YES  Review and summation of old records today    NO  Reviewed patient's current orders and MAR    YES  PMH/SH reviewed - no change compared to H&P  ________________________________________________________________________  Care Plan discussed with:    Comments   Patient x    Family      RN x    Care Manager x    Consultant                       x Multidiciplinary team rounds were held today with , nursing, pharmacist and clinical coordinator. Patient's plan of care was discussed; medications were reviewed and discharge planning was addressed. ________________________________________________________________________  Total NON critical care TIME:  25  Minutes    Total CRITICAL CARE TIME Spent:   Minutes non procedure based      Comments   >50% of visit spent in counseling and coordination of care x    ________________________________________________________________________  Gemini Colón MD     Procedures: see electronic medical records for all procedures/Xrays and details which were not copied into this note but were reviewed prior to creation of Plan. LABS:  I reviewed today's most current labs and imaging studies.   Pertinent labs include:  Recent Labs     02/24/22  0432 02/23/22  1407   WBC 29.4* 20.5*   HGB 10.8* 11.0*   HCT 32.0* 34.2*   * 138*     Recent Labs     02/24/22  0432 02/23/22  1407    141   K 4.0 4.1   * 105   CO2 26 26   * 113*   BUN 22* 22*   CREA 1.60* 1.49*   CA 9.2 10.0   MG 1.5*  --    PHOS 2.9  --    ALB  --  3.4*   TBILI  --  0.7   ALT  --  17       Signed: Gemini Colón MD

## 2022-02-24 NOTE — PROGRESS NOTES
1750) Pt arrive to unit. Order for COVID swab.    1285) Discuss with Dr. Markus Perry order for temporary hold for CT images. Pt confusion. Wife Argentina Yu [phone] permission via telephone for hold for CT images. Wide velcro straps applied across abdomen and arms for duration of CT.   1930) Verbal report to ANGÉLICA Caballero from [facility] and [facility].

## 2022-02-24 NOTE — PROGRESS NOTES
NELDA   RUR 17 %     IDR Rounds this am with MD and team   Continue plan of care     Follow-up to assist with PCP appointment   Called the office and they will follow-up with spouse for and earlier appointment. Follow up with Ariane Rosales MD on 3/13/2022   Specialty: Internal Medicine   [id number] [id number]   [phone]   Apr13 Office Visit 9:45 AM  The Office to call for r an earlier date     Ms. Charity Jackson to finalize discharge plans.   [facility] ROSE RN   [phone]

## 2022-02-24 NOTE — PROGRESS NOTES
NELDA  RUR 17  LOS 1d   ELOS 2d    Medicare pt has received, reviewed, and signed 2nd IM letter informing them of their right to appeal the discharge. Signed copied has been placed on pt bedside chart.   SINAN Hussein/ARIELLE  [phone]

## 2022-02-24 NOTE — PROGRESS NOTES
[facility] Admission Pharmacy Medication Reconciliation     Information obtained from: [address] [place] [address] and [facility] discharge AVS  RxQuery data available1:Yes     Comments/recommendations:    Unable to perform medication reconciliation with patient due to patient condition at this time. Attempted to call patient's wife for medication history with no response. PTA medication list updated from [facility] discharge AVS on 1/31/22 and recent dispense history from [address] [place] [address]. All PTA medications were filled within the last 30 days with the exception of atorvastatin which was last filled 1/12/22 (30 DS). Compliance could not be assessed. Please interpret PTA list with caution. Medication changes (since last review): Added  Trazodone 50 mg  Removed  None  Adjusted  None    The [place] Prescription Monitoring Program () was accessed to determine fill history of any controlled medications. No record found. 1RxQuery pharmacy benefit data reflects medications filled and processed through the patient's insurance, however                this data does NOT capture whether the medication was picked up or is currently being taken by the patient. Patient allergies: Allergies as of 02/23/2022    (No Known Allergies)     Prior to Admission Medications   Prescriptions Last Dose Informant Patient Reported? Taking?   atorvastatin (LIPITOR) 10 mg tablet  Family Member No Yes   Sig: Take 1 Tablet by mouth daily. donepeziL (ARICEPT) 5 mg tablet  Family Member No Yes   Sig: Take 1 Tablet by mouth nightly. risperiDONE (RisperDAL) 0.5 mg tablet  Family Member No Yes   Sig: Take 1 Tablet by mouth nightly. tamsulosin (FLOMAX) 0.4 mg capsule   Yes Yes   Sig: Take 0.4 mg by mouth nightly. traZODone (DESYREL) 50 mg tablet   Yes Yes   Sig: Take 50 mg by mouth nightly.       Facility-Administered Medications: None     Thank you,     Avery Mendoza, PharmD   Contact: [phone]

## 2022-02-24 NOTE — PROGRESS NOTES
Patient currently admitted to [facility]. CTN will follow upon discharge.

## 2022-02-24 NOTE — PROGRESS NOTES
IDR with Dr. Magdiel Stern (MD), Jermain Staley (pharmacist), Noreen Zaldivar (), Osiel Mays (palliative) Lori Bonilla (nurse supervisor),  and Chloe Fermin (RN) to discuss plan of care including continue antibiotics, possible discharge Friday or Saturday.

## 2022-02-25 LAB
ANION GAP SERPL CALC-SCNC: 11 MMOL/L (ref 5–15)
BASOPHILS # BLD: 0 K/UL (ref 0–0.1)
BASOPHILS NFR BLD: 0 % (ref 0–1)
BUN SERPL-MCNC: 19 MG/DL (ref 6–20)
BUN/CREAT SERPL: 15 (ref 12–20)
CALCIUM SERPL-MCNC: 9 MG/DL (ref 8.5–10.1)
CHLORIDE SERPL-SCNC: 108 MMOL/L (ref 97–108)
CO2 SERPL-SCNC: 24 MMOL/L (ref 21–32)
CREAT SERPL-MCNC: 1.23 MG/DL (ref 0.7–1.3)
DIFFERENTIAL METHOD BLD: ABNORMAL
EOSINOPHIL # BLD: 0 K/UL (ref 0–0.4)
EOSINOPHIL NFR BLD: 0 % (ref 0–7)
ERYTHROCYTE [DISTWIDTH] IN BLOOD BY AUTOMATED COUNT: 15 % (ref 11.5–14.5)
GLUCOSE SERPL-MCNC: 91 MG/DL (ref 65–100)
HCT VFR BLD AUTO: 30.7 % (ref 36.6–50.3)
HGB BLD-MCNC: 10.5 G/DL (ref 12.1–17)
IMM GRANULOCYTES # BLD AUTO: 0 K/UL
IMM GRANULOCYTES NFR BLD AUTO: 0 %
LYMPHOCYTES # BLD: 3.4 K/UL (ref 0.8–3.5)
LYMPHOCYTES NFR BLD: 11 % (ref 12–49)
MAGNESIUM SERPL-MCNC: 1.5 MG/DL (ref 1.6–2.4)
MCH RBC QN AUTO: 24.7 PG (ref 26–34)
MCHC RBC AUTO-ENTMCNC: 34.2 G/DL (ref 30–36.5)
MCV RBC AUTO: 72.2 FL (ref 80–99)
MONOCYTES # BLD: 0.6 K/UL (ref 0–1)
MONOCYTES NFR BLD: 2 % (ref 5–13)
NEUTS BAND NFR BLD MANUAL: 15 % (ref 0–6)
NEUTS SEG # BLD: 27.1 K/UL (ref 1.8–8)
NEUTS SEG NFR BLD: 72 % (ref 32–75)
NRBC # BLD: 0 K/UL (ref 0–0.01)
NRBC BLD-RTO: 0 PER 100 WBC
PHOSPHATE SERPL-MCNC: 2 MG/DL (ref 2.6–4.7)
PLATELET # BLD AUTO: 115 K/UL (ref 150–400)
POTASSIUM SERPL-SCNC: 3.6 MMOL/L (ref 3.5–5.1)
RBC # BLD AUTO: 4.25 M/UL (ref 4.1–5.7)
RBC MORPH BLD: ABNORMAL
SODIUM SERPL-SCNC: 143 MMOL/L (ref 136–145)
WBC # BLD AUTO: 31.1 K/UL (ref 4.1–11.1)

## 2022-02-25 PROCEDURE — 74011250637 HC RX REV CODE- 250/637: Performed by: STUDENT IN AN ORGANIZED HEALTH CARE EDUCATION/TRAINING PROGRAM

## 2022-02-25 PROCEDURE — 65270000032 HC RM SEMIPRIVATE

## 2022-02-25 PROCEDURE — 94760 N-INVAS EAR/PLS OXIMETRY 1: CPT

## 2022-02-25 PROCEDURE — 36415 COLL VENOUS BLD VENIPUNCTURE: CPT

## 2022-02-25 PROCEDURE — 85025 COMPLETE CBC W/AUTO DIFF WBC: CPT

## 2022-02-25 PROCEDURE — 84100 ASSAY OF PHOSPHORUS: CPT

## 2022-02-25 PROCEDURE — 80048 BASIC METABOLIC PNL TOTAL CA: CPT

## 2022-02-25 PROCEDURE — 74011000250 HC RX REV CODE- 250: Performed by: STUDENT IN AN ORGANIZED HEALTH CARE EDUCATION/TRAINING PROGRAM

## 2022-02-25 PROCEDURE — 83735 ASSAY OF MAGNESIUM: CPT

## 2022-02-25 PROCEDURE — 74011250636 HC RX REV CODE- 250/636: Performed by: STUDENT IN AN ORGANIZED HEALTH CARE EDUCATION/TRAINING PROGRAM

## 2022-02-25 PROCEDURE — 74011000258 HC RX REV CODE- 258: Performed by: STUDENT IN AN ORGANIZED HEALTH CARE EDUCATION/TRAINING PROGRAM

## 2022-02-25 RX ORDER — TRAZODONE HYDROCHLORIDE 50 MG/1
50 TABLET ORAL
Status: DISCONTINUED | OUTPATIENT
Start: 2022-02-25 | End: 2022-02-27 | Stop reason: HOSPADM

## 2022-02-25 RX ADMIN — CEFEPIME HYDROCHLORIDE 2 G: 2 INJECTION, POWDER, FOR SOLUTION INTRAVENOUS at 00:07

## 2022-02-25 RX ADMIN — DONEPEZIL HYDROCHLORIDE 5 MG: 5 TABLET, FILM COATED ORAL at 21:44

## 2022-02-25 RX ADMIN — CEFEPIME HYDROCHLORIDE 2 G: 2 INJECTION, POWDER, FOR SOLUTION INTRAVENOUS at 11:52

## 2022-02-25 RX ADMIN — CEFEPIME HYDROCHLORIDE 2 G: 2 INJECTION, POWDER, FOR SOLUTION INTRAVENOUS at 23:42

## 2022-02-25 RX ADMIN — SODIUM CHLORIDE, PRESERVATIVE FREE 10 ML: 5 INJECTION INTRAVENOUS at 13:05

## 2022-02-25 RX ADMIN — HEPARIN SODIUM 5000 UNITS: 5000 INJECTION, SOLUTION INTRAVENOUS; SUBCUTANEOUS at 08:56

## 2022-02-25 RX ADMIN — SODIUM CHLORIDE 75 ML/HR: 9 INJECTION, SOLUTION INTRAVENOUS at 16:01

## 2022-02-25 RX ADMIN — ATORVASTATIN CALCIUM 10 MG: 10 TABLET ORAL at 08:56

## 2022-02-25 RX ADMIN — HEPARIN SODIUM 5000 UNITS: 5000 INJECTION, SOLUTION INTRAVENOUS; SUBCUTANEOUS at 21:44

## 2022-02-25 RX ADMIN — RISPERIDONE 0.5 MG: 0.25 TABLET, FILM COATED ORAL at 21:44

## 2022-02-25 RX ADMIN — TAMSULOSIN HYDROCHLORIDE 0.4 MG: 0.4 CAPSULE ORAL at 21:44

## 2022-02-25 RX ADMIN — SODIUM CHLORIDE, PRESERVATIVE FREE 10 ML: 5 INJECTION INTRAVENOUS at 23:42

## 2022-02-25 NOTE — PROGRESS NOTES
Bedside and Verbal shift change report given to Nikole Ryan (oncoming nurse) by Tran Barber and ANGÉLICA Zapata (offgoing nurse). Report included the following information SBAR, Kardex, MAR and Recent Results.

## 2022-02-25 NOTE — PROGRESS NOTES
Hospitalist Progress Note    NAME: Dario Farias   :  1950   MRN:  [id number]   Room Number:  149/69  @ [facility]       Interim Hospital Summary: 80 y.o. male whom presented on 2022 with      Assessment / Plan:         Severe sepsis POA  Leukocytosis POA  UA positive for infection. On admission,  bpm, WBC 20.5, Lactic acid 2.53. CXR interpreted indepedently - no acute process noted.      - C/W Cefepime  - Follow up urine Cx, blood Cx  - IV hydration     Altered mental status POA  Dementia POA  Suspect due to sepsis and ZEN. Underlying dementia. CT Head interpreted independently - no acute ischemia, hemorrhage or mass. Chronic encephalomalacia.     - resume risperidone and donepezil.  - Frequent reorientation           ZEN POA on CKD POA   Baseline creatinine 1.2-1.3. Current BUN/Cr 34/1. 84. CT A/P showed no obstruction     - Strict Is and Os,avoid nephrotoxic meds, renally dose meds  - IV fluids            Tachycardia POA   noted on admission. Suspect due to sepsis and hypovolemia. EKG interpreted independently - sinus tachycardia.      - Fluid repletion         Amputation of bilateral lower extremities POA        HTN POA  HLD POA  - resume home meds         Body mass index is 16.14 kg/m². Malnutrition, severe, protein calorie     - RD consult         Code Status: full   Surrogate Decision Maker:  Wife      DVT Prophylaxis: Lovenox  GI Prophylaxis: not indicated  Baseline: ambulatory independently             Subjective:     Chief Complaint / Reason for Physician Visit  \"doing ok\". Discussed with RN events overnight - confusion overnight     Review of Systems:  No fevers, chills, appetite change, cough, sputum production, shortness of breath, dyspnea on exertion, nausea, vomitting, diarrhea, constipation, chest pain, leg edema, abdominal pain, joint pain, rash, itching. Tolerating PT/OT. Tolerating diet.        Objective:     VITALS:   Last 24hrs VS reviewed since prior progress note. Most recent are:  Patient Vitals for the past 24 hrs:   Temp Pulse Resp BP SpO2   02/25/22 1148 97.9 °F (36.6 °C) (!) 103 20 (!) 151/92 100 %   02/25/22 0845 -- -- -- (!) 141/96 --   02/25/22 0817 97.4 °F (36.3 °C) 100 18 (!) 145/101 100 %   02/25/22 0800 -- 100 -- -- --   02/25/22 0430 98.9 °F (37.2 °C) 93 18 (!) 142/93 99 %   02/25/22 0030 98.5 °F (36.9 °C) 97 18 (!) 145/84 99 %   02/24/22 2030 98.2 °F (36.8 °C) 95 18 (!) 145/90 96 %   02/24/22 1651 97 °F (36.1 °C) 98 18 (!) 142/93 100 %   02/24/22 1600 -- (!) 106 -- -- --       Intake/Output Summary (Last 24 hours) at 2/25/2022 1429  Last data filed at 2/25/2022 0745  Gross per 24 hour   Intake 2018.75 ml   Output 400 ml   Net 1618.75 ml        PHYSICAL EXAM:  General: Alert, cooperative, no acute distress    EENT:  EOMI. Anicteric sclerae. MMM  Resp:  CTA bilaterally, no wheezing or rales. No accessory muscle use  CV:  Regular  rhythm,  normal S1/S2, no murmurs rubs gallops, No edema  GI:  Soft, Non distended, Non tender. +Bowel sounds  Neurologic:  Alert and oriented X self, normal speech,   Psych:   poor insight. Not anxious nor agitated  Skin:  No rashes. No jaundice    Reviewed most current lab test results and cultures  YES  Reviewed most current radiology test results   YES  Review and summation of old records today    NO  Reviewed patient's current orders and MAR    YES  PMH/SH reviewed - no change compared to H&P  ________________________________________________________________________  Care Plan discussed with:    Comments   Patient x    Family      RN x    Care Manager x    Consultant                       x Multidiciplinary team rounds were held today with , nursing, pharmacist and clinical coordinator. Patient's plan of care was discussed; medications were reviewed and discharge planning was addressed.      ________________________________________________________________________  Total NON critical care TIME:  25 Minutes    Total CRITICAL CARE TIME Spent:   Minutes non procedure based      Comments   >50% of visit spent in counseling and coordination of care x    ________________________________________________________________________  Maikel Caraballo MD     Procedures: see electronic medical records for all procedures/Xrays and details which were not copied into this note but were reviewed prior to creation of Plan. LABS:  I reviewed today's most current labs and imaging studies.   Pertinent labs include:  Recent Labs     02/25/22 0235 02/24/22 0432 02/23/22  1407   WBC 31.1* 29.4* 20.5*   HGB 10.5* 10.8* 11.0*   HCT 30.7* 32.0* 34.2*   * 141* 138*     Recent Labs     02/25/22 0235 02/24/22  0432 02/23/22  1407    144 141   K 3.6 4.0 4.1    109* 105   CO2 24 26 26   GLU 91 150* 113*   BUN 19 22* 22*   CREA 1.23 1.60* 1.49*   CA 9.0 9.2 10.0   MG 1.5* 1.5*  --    PHOS 2.0* 2.9  --    ALB  --   --  3.4*   TBILI  --   --  0.7   ALT  --   --  17       Signed: Maikel Caraballo MD

## 2022-02-25 NOTE — PROGRESS NOTES
Problem: Pressure Injury - Risk of  Goal: *Prevention of pressure injury  Description: Document Geo Scale and appropriate interventions in the flowsheet. Outcome: Progressing Towards Goal  Note: Pressure Injury Interventions:  Sensory Interventions: Assess changes in LOC    Moisture Interventions: Absorbent underpads,Internal/External urinary devices    Activity Interventions: PT/OT evaluation    Mobility Interventions: Assess need for specialty bed    Nutrition Interventions: Offer support with meals,snacks and hydration    Friction and Shear Interventions: Apply protective barrier, creams and emollients,Feet elevated on foot rest                Problem: Falls - Risk of  Goal: *Absence of Falls  Description: Document Telly Fall Risk and appropriate interventions in the flowsheet. Outcome: Progressing Towards Goal  Note: Fall Risk Interventions:       Mentation Interventions: Bed/chair exit alarm,Door open when patient unattended         Elimination Interventions: Urinal in reach              Problem: Risk for Spread of Infection  Goal: Prevent transmission of infectious organism to others  Description: Prevent the transmission of infectious organisms to other patients, staff members, and visitors.   Outcome: Progressing Towards Goal     Problem: Sepsis: Day 2  Goal: *Tolerating diet  Outcome: Progressing Towards Goal  Goal: Activity/Safety  Outcome: Progressing Towards Goal  Goal: Nutrition/Diet  Outcome: Progressing Towards Goal  Goal: Discharge Planning  Outcome: Progressing Towards Goal  Goal: Medications  Outcome: Progressing Towards Goal

## 2022-02-25 NOTE — PROGRESS NOTES
Bedside and Verbal shift change report given to Xiang Rossi RN (oncoming nurse) by Chino Low RN and Lianet Reinoso RN (offgoing nurse). Report included the following information SBAR, Kardex, Intake/Output, MAR and Recent Results.

## 2022-02-25 NOTE — PROGRESS NOTES
Physician Progress Note      PATIENT:               Jose G Harden  CSN #:                  [id number]  :                       1950  ADMIT DATE:       2022 1:36 PM  [address] DATE:  RESPONDING  PROVIDER #:        Loc Arreaga MD [facility] MD        QUERY TEXT:    Stage of Chronic Kidney Disease: Please provide further specificity, if known. Clinical indicators include: ckd, creatinine, bun/cr, bun, bun/creatinine, gfr  Options provided:  -- Chronic kidney disease stage 1  -- Chronic kidney disease stage 2  -- Chronic kidney disease stage 3  -- Chronic kidney disease stage 3a  -- Chronic kidney disease stage 3b  -- Chronic kidney disease stage 4  -- Chronic kidney disease stage 5  -- Chronic kidney disease stage 5, requiring dialysis  -- End stage renal disease  -- Other - I will add my own diagnosis  -- Disagree - Not applicable / Not valid  -- Disagree - Clinically Unable to determine / Unknown        PROVIDER RESPONSE TEXT:    The patient has chronic kidney disease stage 3.       Electronically signed by:  Loc Arreaga MD [facility] MD 2022 8:44 AM

## 2022-02-25 NOTE — PROGRESS NOTES
Tele-med cross coverage    Nurse reported wbc 31k from 29k  Chart reviewed, admitted for severe sepsis due to ?UTI, abx switched to cefepime yesterday, urine cx pending.  Cont current management

## 2022-02-25 NOTE — PROGRESS NOTES
Problem: Pressure Injury - Risk of  Goal: *Prevention of pressure injury  Description: Document Geo Scale and appropriate interventions in the flowsheet. Outcome: Progressing Towards Goal  Note: Pressure Injury Interventions:  Sensory Interventions: Assess changes in LOC    Moisture Interventions: Absorbent underpads,Internal/External urinary devices    Activity Interventions: PT/OT evaluation    Mobility Interventions: Assess need for specialty bed    Nutrition Interventions: Offer support with meals,snacks and hydration    Friction and Shear Interventions: Apply protective barrier, creams and emollients,Feet elevated on foot rest                Problem: Falls - Risk of  Goal: *Absence of Falls  Description: Document Telly Fall Risk and appropriate interventions in the flowsheet.   Outcome: Progressing Towards Goal  Note: Fall Risk Interventions:       Mentation Interventions: Bed/chair exit alarm,Door open when patient unattended         Elimination Interventions: Urinal in reach

## 2022-02-25 NOTE — PROGRESS NOTES
3543) Bedside shift change report given to Lance Kim RN (oncoming nurse) by Amado Card RN (offgoing nurse). Report included the following information SBAR, Kardex, Intake/Output, MAR, Recent Results and Cardiac Rhythm NSR with PVC. +    0845) Pt assessed and BP retaken and elevated at 141/96. Pt pulled out IV line, Linens changed and new IV to be started.m Pt reconnected to tele and running NSR at this time. Scheduled meds given. Pt assisted with eating breakfast and left resting in bed at this time. 0930) Attempted to restart IV and unsuccuessful. 21 ) Interdisciplinary team rounds were held 2/25/2022 with the following team members:Care Management, Nursing, Pharmacy and Physician.     Plan of care discussed. See clinical pathway and/or care plan for interventions and desired outcomes. 1150) Pt reassessed and no changes to note. PT slightly tachy at 104 and BP still elevated. Incontinence care provided. New 22G started in left forearm. IV antibiotic hung and infusing at this time. 1305) IV flushed and patent. IV antibiotic completed. NS infusing at 75 ml/hr. Pt left resting in bed at this time and stated no additional needs. 1435) Incontinence care provided. Pt repositioned in bed. Pt stated no additional needs at this time. 1555) Pt reassessed and no changes to note. Vital signs stable, BP still elevated at 143/98. Pt had no c/o pain and left resting in bed at this time. 1810) Incontinence care provided. New linens provided. Pt bathed with CHG wipes. Pt repositioned in bed and stated no additional needs at this time. 1900) Bedside shift change report given to Anand Browning RN (oncoming nurse) by Lance Kim RN (offgoing nurse). Report included the following information SBAR, Kardex, Intake/Output, MAR, Recent Results and Cardiac Rhythm NSR to Tachy.

## 2022-02-26 LAB
ANION GAP SERPL CALC-SCNC: 10 MMOL/L (ref 5–15)
BACTERIA SPEC CULT: ABNORMAL
BASOPHILS # BLD: 0 K/UL (ref 0–0.1)
BASOPHILS NFR BLD: 0 % (ref 0–1)
BUN SERPL-MCNC: 13 MG/DL (ref 6–20)
BUN/CREAT SERPL: 12 (ref 12–20)
CALCIUM SERPL-MCNC: 9.1 MG/DL (ref 8.5–10.1)
CC UR VC: ABNORMAL
CHLORIDE SERPL-SCNC: 106 MMOL/L (ref 97–108)
CO2 SERPL-SCNC: 23 MMOL/L (ref 21–32)
CREAT SERPL-MCNC: 1.12 MG/DL (ref 0.7–1.3)
DIFFERENTIAL METHOD BLD: ABNORMAL
EOSINOPHIL # BLD: 0.2 K/UL (ref 0–0.4)
EOSINOPHIL NFR BLD: 1 % (ref 0–7)
ERYTHROCYTE [DISTWIDTH] IN BLOOD BY AUTOMATED COUNT: 14.8 % (ref 11.5–14.5)
GLUCOSE SERPL-MCNC: 105 MG/DL (ref 65–100)
HCT VFR BLD AUTO: 31.4 % (ref 36.6–50.3)
HGB BLD-MCNC: 11 G/DL (ref 12.1–17)
IMM GRANULOCYTES # BLD AUTO: 0 K/UL
IMM GRANULOCYTES NFR BLD AUTO: 0 %
LYMPHOCYTES # BLD: 2.7 K/UL (ref 0.8–3.5)
LYMPHOCYTES NFR BLD: 11 % (ref 12–49)
MCH RBC QN AUTO: 25.2 PG (ref 26–34)
MCHC RBC AUTO-ENTMCNC: 35 G/DL (ref 30–36.5)
MCV RBC AUTO: 71.9 FL (ref 80–99)
MONOCYTES # BLD: 1 K/UL (ref 0–1)
MONOCYTES NFR BLD: 4 % (ref 5–13)
NEUTS BAND NFR BLD MANUAL: 5 % (ref 0–6)
NEUTS SEG # BLD: 20.5 K/UL (ref 1.8–8)
NEUTS SEG NFR BLD: 79 % (ref 32–75)
NRBC # BLD: 0.02 K/UL (ref 0–0.01)
NRBC BLD-RTO: 0.1 PER 100 WBC
PLATELET # BLD AUTO: 142 K/UL (ref 150–400)
PMV BLD AUTO: 11.3 FL (ref 8.9–12.9)
POTASSIUM SERPL-SCNC: 3.4 MMOL/L (ref 3.5–5.1)
RBC # BLD AUTO: 4.37 M/UL (ref 4.1–5.7)
RBC MORPH BLD: ABNORMAL
SERVICE CMNT-IMP: ABNORMAL
SODIUM SERPL-SCNC: 139 MMOL/L (ref 136–145)
WBC # BLD AUTO: 24.4 K/UL (ref 4.1–11.1)

## 2022-02-26 PROCEDURE — 65270000032 HC RM SEMIPRIVATE

## 2022-02-26 PROCEDURE — 74011000250 HC RX REV CODE- 250: Performed by: STUDENT IN AN ORGANIZED HEALTH CARE EDUCATION/TRAINING PROGRAM

## 2022-02-26 PROCEDURE — 2709999900 HC NON-CHARGEABLE SUPPLY

## 2022-02-26 PROCEDURE — 74011250636 HC RX REV CODE- 250/636: Performed by: STUDENT IN AN ORGANIZED HEALTH CARE EDUCATION/TRAINING PROGRAM

## 2022-02-26 PROCEDURE — 80048 BASIC METABOLIC PNL TOTAL CA: CPT

## 2022-02-26 PROCEDURE — 74011250637 HC RX REV CODE- 250/637: Performed by: STUDENT IN AN ORGANIZED HEALTH CARE EDUCATION/TRAINING PROGRAM

## 2022-02-26 PROCEDURE — 36415 COLL VENOUS BLD VENIPUNCTURE: CPT

## 2022-02-26 PROCEDURE — 85025 COMPLETE CBC W/AUTO DIFF WBC: CPT

## 2022-02-26 RX ORDER — LEVOFLOXACIN 750 MG/1
750 TABLET ORAL
Status: DISCONTINUED | OUTPATIENT
Start: 2022-02-26 | End: 2022-02-27 | Stop reason: HOSPADM

## 2022-02-26 RX ORDER — POTASSIUM CHLORIDE 750 MG/1
10 TABLET, FILM COATED, EXTENDED RELEASE ORAL
Status: COMPLETED | OUTPATIENT
Start: 2022-02-26 | End: 2022-02-26

## 2022-02-26 RX ADMIN — TAMSULOSIN HYDROCHLORIDE 0.4 MG: 0.4 CAPSULE ORAL at 22:07

## 2022-02-26 RX ADMIN — POTASSIUM CHLORIDE 10 MEQ: 750 TABLET, FILM COATED, EXTENDED RELEASE ORAL at 12:17

## 2022-02-26 RX ADMIN — DONEPEZIL HYDROCHLORIDE 5 MG: 5 TABLET, FILM COATED ORAL at 22:07

## 2022-02-26 RX ADMIN — HEPARIN SODIUM 5000 UNITS: 5000 INJECTION, SOLUTION INTRAVENOUS; SUBCUTANEOUS at 22:07

## 2022-02-26 RX ADMIN — ATORVASTATIN CALCIUM 10 MG: 10 TABLET ORAL at 09:24

## 2022-02-26 RX ADMIN — SODIUM CHLORIDE, PRESERVATIVE FREE 10 ML: 5 INJECTION INTRAVENOUS at 06:30

## 2022-02-26 RX ADMIN — SODIUM CHLORIDE 75 ML/HR: 9 INJECTION, SOLUTION INTRAVENOUS at 06:41

## 2022-02-26 RX ADMIN — RISPERIDONE 0.5 MG: 0.25 TABLET, FILM COATED ORAL at 22:07

## 2022-02-26 RX ADMIN — HEPARIN SODIUM 5000 UNITS: 5000 INJECTION, SOLUTION INTRAVENOUS; SUBCUTANEOUS at 09:24

## 2022-02-26 RX ADMIN — LEVOFLOXACIN 750 MG: 750 TABLET, FILM COATED ORAL at 11:29

## 2022-02-26 NOTE — PROGRESS NOTES
Bedside shift change report given to Neymar Santana (oncoming nurse) by Zak Mills (offgoing nurse).  Report included the following information SBAR, Kardex, Intake/Output, MAR, Recent Results and Cardiac Rhythm SR,ST.

## 2022-02-26 NOTE — PROGRESS NOTES
Hospitalist Progress Note    NAME: Evaristo Vargas   :  1950   MRN:  [id number]   Room Number:  943/75  @ [facility]       Interim Hospital Summary: 80 y.o. male whom presented on 2022 with      Assessment / Plan:         Severe sepsis POA resolved   Leukocytosis POA  UA positive for infection. On admission,  bpm, WBC 20.5, Lactic acid 2.53. CXR interpreted indepedently - no acute process noted. - UCX pseudomonas , BCX NGT     -Change cefepime to levofloxacin 70 mg every 48 for 10 days     Altered mental status POA resolved  Dementia POA  Suspect due to sepsis and ZEN. Underlying dementia. CT Head interpreted independently - no acute ischemia, hemorrhage or mass. Chronic encephalomalacia.     - resume risperidone and donepezil.  - Frequent reorientation           ZEN POA on CKD POA resolved   Baseline creatinine 1.2-1.3. Current BUN/Cr 34/1. 84. CT A/P showed no obstruction     - Strict Is and Os,avoid nephrotoxic meds, renally dose meds  - IV fluids            Tachycardia POA resolved    noted on admission. Suspect due to sepsis and hypovolemia. EKG interpreted independently - sinus tachycardia.      - Fluid repletion         Amputation of bilateral lower extremities POA        HTN POA  HLD POA  - resume home meds         Body mass index is 16.14 kg/m². Malnutrition, severe, protein calorie     - RD consult         Code Status: full   Surrogate Decision Maker:  Wife      DVT Prophylaxis: Lovenox  GI Prophylaxis: not indicated  Baseline: ambulatory independently             Subjective:     Chief Complaint / Reason for Physician Visit  \"doing ok\". Discussed with RN events overnight - confusion overnight     Review of Systems:  No fevers, chills, appetite change, cough, sputum production, shortness of breath, dyspnea on exertion, nausea, vomitting, diarrhea, constipation, chest pain, leg edema, abdominal pain, joint pain, rash, itching. Tolerating PT/OT. Tolerating diet. Objective:     VITALS:   Last 24hrs VS reviewed since prior progress note. Most recent are:  Patient Vitals for the past 24 hrs:   Temp Pulse Resp BP SpO2   02/26/22 0357 98.1 °F (36.7 °C) 87 16 (!) 146/95 98 %   02/26/22 0313 -- 77 -- -- --   02/25/22 2342 98.1 °F (36.7 °C) 80 16 (!) 148/90 95 %   02/25/22 2000 -- 90 -- -- --   02/25/22 1946 98.1 °F (36.7 °C) 89 18 (!) 153/94 95 %   02/25/22 1553 97 °F (36.1 °C) 99 18 (!) 142/98 97 %   02/25/22 1148 97.9 °F (36.6 °C) (!) 103 20 (!) 151/92 100 %     No intake or output data in the 24 hours ending 02/26/22 0926     PHYSICAL EXAM:  General: Alert, cooperative, no acute distress    EENT:  EOMI. Anicteric sclerae. MMM  Resp:  CTA bilaterally, no wheezing or rales. No accessory muscle use  CV:  Regular  rhythm,  normal S1/S2, no murmurs rubs gallops, No edema  GI:  Soft, Non distended, Non tender. +Bowel sounds  Neurologic:  Alert and oriented X self, normal speech,   Psych:   poor insight. Not anxious nor agitated  Skin:  No rashes. No jaundice    Reviewed most current lab test results and cultures  YES  Reviewed most current radiology test results   YES  Review and summation of old records today    NO  Reviewed patient's current orders and MAR    YES  PMH/ reviewed - no change compared to H&P  ________________________________________________________________________  Care Plan discussed with:    Comments   Patient x    Family      RN x    Care Manager x    Consultant                       x Multidiciplinary team rounds were held today with , nursing, pharmacist and clinical coordinator. Patient's plan of care was discussed; medications were reviewed and discharge planning was addressed.      ________________________________________________________________________  Total NON critical care TIME:  25   Minutes    Total CRITICAL CARE TIME Spent:   Minutes non procedure based      Comments   >50% of visit spent in counseling and coordination of care x ________________________________________________________________________  Nithya Hyatt MD     Procedures: see electronic medical records for all procedures/Xrays and details which were not copied into this note but were reviewed prior to creation of Plan. LABS:  I reviewed today's most current labs and imaging studies.   Pertinent labs include:  Recent Labs     02/25/22 0235 02/24/22  0432 02/23/22  1407   WBC 31.1* 29.4* 20.5*   HGB 10.5* 10.8* 11.0*   HCT 30.7* 32.0* 34.2*   * 141* 138*     Recent Labs     02/25/22 0235 02/24/22  0432 02/23/22  1407    144 141   K 3.6 4.0 4.1    109* 105   CO2 24 26 26   GLU 91 150* 113*   BUN 19 22* 22*   CREA 1.23 1.60* 1.49*   CA 9.0 9.2 10.0   MG 1.5* 1.5*  --    PHOS 2.0* 2.9  --    ALB  --   --  3.4*   TBILI  --   --  0.7   ALT  --   --  17       Signed: Nithya Hyatt MD

## 2022-02-26 NOTE — PROGRESS NOTES
Problem: Pressure Injury - Risk of  Goal: *Prevention of pressure injury  Description: Document Geo Scale and appropriate interventions in the flowsheet. Outcome: Progressing Towards Goal  Note: Pressure Injury Interventions:  Sensory Interventions: Assess changes in LOC,Keep linens dry and wrinkle-free,Maintain/enhance activity level    Moisture Interventions: Absorbent underpads,Apply protective barrier, creams and emollients    Activity Interventions: PT/OT evaluation    Mobility Interventions: PT/OT evaluation    Nutrition Interventions: Offer support with meals,snacks and hydration    Friction and Shear Interventions: Apply protective barrier, creams and emollients                Problem: Falls - Risk of  Goal: *Absence of Falls  Description: Document Telly Fall Risk and appropriate interventions in the flowsheet.   Outcome: Progressing Towards Goal  Note: Fall Risk Interventions:       Mentation Interventions: Bed/chair exit alarm,Door open when patient unattended         Elimination Interventions: Bed/chair exit alarm              Problem: Patient Education: Go to Patient Education Activity  Goal: Patient/Family Education  Outcome: Progressing Towards Goal     Problem: Sepsis: Day 3  Goal: Off Pathway (Use only if patient is Off Pathway)  Outcome: Progressing Towards Goal  Goal: *Oxygen saturation within defined limits  Outcome: Progressing Towards Goal  Goal: *Vital sign stability  Outcome: Progressing Towards Goal  Goal: *Tolerating diet  Outcome: Progressing Towards Goal  Goal: Activity/Safety  Outcome: Progressing Towards Goal  Goal: Discharge Planning  Outcome: Progressing Towards Goal  Goal: Medications  Outcome: Progressing Towards Goal

## 2022-02-26 NOTE — PROGRESS NOTES
NELDA  RUR 17  LOS 3d  ELOS Discharge expected on Sunday 2/27    CM met with Dr. Denise Collado to discuss plan of care. May change to PO ABT and then patient will be ready for discharge. CM met patient in room. Some confusion noted which is baseline as stated in Dr. Kaye Gold progress note dated 2/26 \"underlying dementia\".     SINAN Basurto/ARIELLE  [phone]

## 2022-02-26 NOTE — PROGRESS NOTES
Comprehensive Nutrition Assessment    Type and Reason for Visit: (P) Initial,Consult    Nutrition Recommendations/Plan: Diet as tolerated. Supplements and extra food ordered for pt. Cont hydration. Nutrition Assessment:  (P) PT admitted with severe sepsis, leukocytosis, UA + for infection. AMS, dementia, TD, ZEN on CKD. Hx notable for HTN, HLD, IGT. Both lower extremities amputated, adjusted BMI is 19.2 per my previous note. PT meets ASPEN criteria for moderate malnutrition    Meets Criteria for Chronic Malnutrition   [] Severe Malnutrition, as evidenced by:   [] Severe muscle wasting, loss of subcutaneous fat   [] Nutritional intake of <75% of recommended intake for >1 month   [] Weight loss of  >5% in 1 month, >7.5% in 3 months, >10% in 6 months, >20% in 1 year   [] Severe edema   [x]Moderate Malnutrition, as evidenced by:   [x] Mild muscle wasting, loss of subcutaneous fat   [x] Nutritional intake <75% of recommended intake for >1 month   [x] Weight loss of  5% in 1 month, 7.5% in 3 months, 10% in 6 months, or 20% in 1 year   [] Mild edema       Malnutrition Assessment:  Malnutrition Status:     moderate  Context:      chronic illness  Findings of the 6 clinical characteristics of malnutrition:   Energy Intake:   < 75%  Weight Loss:      not quantified  Body Fat Loss:   ,   mod  Muscle Mass Loss:   ,  mod  Fluid Accumulation:   ,  n/a   Strength:    reduced    Estimated Daily Nutrient Needs:  Energy (kcal): (P) 1815; Weight Used for Energy Requirements: (P) Current  Protein (g): (P) 58 (1.3 g/kg CBW (CKD-III));  Weight Used for Protein Requirements: (P) Current  Fluid (ml/day): (P) 1955; Method Used for Fluid Requirements: (P) Other (comment)      Nutrition Related Findings:  (P) PT tolerating diet      Wounds:            Current Nutrition Therapies:  ADULT DIET Regular    Anthropometric Measures:  Height:  (P) 5' 6\" (167.6 cm) (prior to amputation)  Current Body Wt:  (P) 45 kg (99 lb 4.8 oz) Admission Body Wt:       Usual Body Wt:        Ideal Body Wt:  (P) 142 lbs:  (P) 69.9 %   Adjusted Body Weight:  (P) 119.4;  Weight Adjustment for: (P) Amputation   Adjusted BMI:  (P) 19.2    BMI Category:  (P) Underweight (BMI less than 22) age over 72       Nutrition Diagnosis:   (P) Inadequate protein-energy intake,Moderate malnutrition,In context of chronic illness,Underweight related to (P) cognitive or neurological impairment,inadequate protein-energy intake,psychological cause or life stress as evidenced by (P) intake 51-75%,poor intake prior to admission,BMI,weight loss greater than or equal to 5% in 1 month,moderate loss of subcutaneous fat,moderate muscle loss,reduced  strength,poor dentition    Nutrition Interventions:   Food and/or Nutrient Delivery: (P) Modify current diet,Continue oral nutrition supplement  Nutrition Education and Counseling: (P) No recommendations at this time  Coordination of Nutrition Care: (P) Continue to monitor while inpatient    Goals:  (P) PO intake of meals and ONS > 60% next 5-8 days       Nutrition Monitoring and Evaluation:   Behavioral-Environmental Outcomes: (P) Readiness for change  Food/Nutrient Intake Outcomes: (P) Food and nutrient intake,Supplement intake  Physical Signs/Symptoms Outcomes: (P) Meal time behavior,Weight,Nutrition focused physical findings    Discharge Planning:    (P) Too soon to determine     Electronically signed by Danna Aguilera, PhD, RD, [facility] on 2/26/2022 at 10:04 AM    Contact: [phone]

## 2022-02-26 NOTE — PROGRESS NOTES
0438) Bedside shift change report given to Raimundo Hernandez RN (oncoming nurse) by Laura Zurita RN (offgoing nurse). Report included the following information SBAR, Kardex, Intake/Output, MAR and Cardiac Rhythm NSR to Sinus Tach. 0740) Pt asleep in bed at this time, will assess later    0925) Pt assessed and vital signs stable. Pt has no c/o pain at this time. Scheduled meds given. Both IV's flushed and infiltrated. IV removed. Ice pack provided and pt left resting in bed. Will attempt IV start when swelling decreases     1000) Wife called and pt verified information was able to be provided. Updates given. 1010) Pt removed from tele. MD in the room at this time. 56) Interdisciplinary team rounds were held 2/26/2022 with the following team members:Nursing and Physician. Plan of care discussed. See clinical pathway and/or care plan for interventions and desired outcomes. 1100) Labs drawn and sent down. Incontinence care provided. Antibiotic given in applesauce. 863 1687) Critical lab obtained by Khadijah Lozano and reported to MD and this writer. WBC 24.4 at this time but trending down for 31.1 on 2/25/2022. No new orders at this time. Potassium given as ordered. 1345) CHG bath provided along with incontinence care. Pt left resting in bed at this time and stated no additional needs. 1515) Pt reassessed and no changes to note. Vital signs stable and pt has no c/o pain at this time. Incontinence care provided and pt left resting in bed at this time. 1645) Incontinence care provided. Pt sitting up in bed eating at this time. 1830) Incontinence care provided. Pt stated no additional needs at this time. 1900) Bedside shift change report given to  Laura Zurita RN (oncoming nurse) by Naheed Jo (offgoing nurse). Report included the following information SBAR, Kardex, Intake/Output, MAR and Recent Results.

## 2022-02-26 NOTE — PROGRESS NOTES
Cardiac Monitoring on 22 from [id number]    Tele Strip on 22 at 0300 - Sinus Rhythm. No change. However, tele order has . Rn notified.

## 2022-02-27 VITALS
SYSTOLIC BLOOD PRESSURE: 130 MMHG | RESPIRATION RATE: 18 BRPM | TEMPERATURE: 97 F | DIASTOLIC BLOOD PRESSURE: 86 MMHG | WEIGHT: 109.1 LBS | OXYGEN SATURATION: 98 % | BODY MASS INDEX: 17.53 KG/M2 | HEIGHT: 66 IN | HEART RATE: 100 BPM

## 2022-02-27 LAB
ANION GAP SERPL CALC-SCNC: 9 MMOL/L (ref 5–15)
BASOPHILS # BLD: 0.2 K/UL (ref 0–0.1)
BASOPHILS NFR BLD: 1 % (ref 0–1)
BUN SERPL-MCNC: 11 MG/DL (ref 6–20)
BUN/CREAT SERPL: 10 (ref 12–20)
CALCIUM SERPL-MCNC: 9.8 MG/DL (ref 8.5–10.1)
CHLORIDE SERPL-SCNC: 106 MMOL/L (ref 97–108)
CO2 SERPL-SCNC: 26 MMOL/L (ref 21–32)
CREAT SERPL-MCNC: 1.13 MG/DL (ref 0.7–1.3)
DIFFERENTIAL METHOD BLD: ABNORMAL
EOSINOPHIL # BLD: 0.3 K/UL (ref 0–0.4)
EOSINOPHIL NFR BLD: 2 % (ref 0–7)
ERYTHROCYTE [DISTWIDTH] IN BLOOD BY AUTOMATED COUNT: 14.8 % (ref 11.5–14.5)
GLUCOSE BLD STRIP.AUTO-MCNC: 168 MG/DL (ref 65–117)
GLUCOSE SERPL-MCNC: 136 MG/DL (ref 65–100)
HCT VFR BLD AUTO: 32.2 % (ref 36.6–50.3)
HGB BLD-MCNC: 11.2 G/DL (ref 12.1–17)
IMM GRANULOCYTES # BLD AUTO: 0.3 K/UL (ref 0–0.04)
IMM GRANULOCYTES NFR BLD AUTO: 2 % (ref 0–0.5)
LYMPHOCYTES # BLD: 2.5 K/UL (ref 0.8–3.5)
LYMPHOCYTES NFR BLD: 16 % (ref 12–49)
MCH RBC QN AUTO: 24.9 PG (ref 26–34)
MCHC RBC AUTO-ENTMCNC: 34.8 G/DL (ref 30–36.5)
MCV RBC AUTO: 71.7 FL (ref 80–99)
MONOCYTES # BLD: 0.9 K/UL (ref 0–1)
MONOCYTES NFR BLD: 6 % (ref 5–13)
NEUTS SEG # BLD: 11.4 K/UL (ref 1.8–8)
NEUTS SEG NFR BLD: 73 % (ref 32–75)
NRBC # BLD: 0 K/UL (ref 0–0.01)
NRBC BLD-RTO: 0 PER 100 WBC
PLATELET # BLD AUTO: 145 K/UL (ref 150–400)
PMV BLD AUTO: 9.8 FL (ref 8.9–12.9)
POTASSIUM SERPL-SCNC: 3.6 MMOL/L (ref 3.5–5.1)
RBC # BLD AUTO: 4.49 M/UL (ref 4.1–5.7)
RBC MORPH BLD: ABNORMAL
SERVICE CMNT-IMP: ABNORMAL
SODIUM SERPL-SCNC: 141 MMOL/L (ref 136–145)
WBC # BLD AUTO: 15.6 K/UL (ref 4.1–11.1)

## 2022-02-27 PROCEDURE — 36415 COLL VENOUS BLD VENIPUNCTURE: CPT

## 2022-02-27 PROCEDURE — 85025 COMPLETE CBC W/AUTO DIFF WBC: CPT

## 2022-02-27 PROCEDURE — 80048 BASIC METABOLIC PNL TOTAL CA: CPT

## 2022-02-27 PROCEDURE — 74011250637 HC RX REV CODE- 250/637: Performed by: STUDENT IN AN ORGANIZED HEALTH CARE EDUCATION/TRAINING PROGRAM

## 2022-02-27 PROCEDURE — 82962 GLUCOSE BLOOD TEST: CPT

## 2022-02-27 PROCEDURE — 74011250636 HC RX REV CODE- 250/636: Performed by: STUDENT IN AN ORGANIZED HEALTH CARE EDUCATION/TRAINING PROGRAM

## 2022-02-27 RX ORDER — LEVOFLOXACIN 750 MG/1
750 TABLET ORAL
Qty: 5 TABLET | Refills: 0 | Status: SHIPPED | OUTPATIENT
Start: 2022-02-28 | End: 2022-03-10

## 2022-02-27 RX ADMIN — ATORVASTATIN CALCIUM 10 MG: 10 TABLET ORAL at 09:12

## 2022-02-27 RX ADMIN — HEPARIN SODIUM 5000 UNITS: 5000 INJECTION, SOLUTION INTRAVENOUS; SUBCUTANEOUS at 09:12

## 2022-02-27 NOTE — PROGRESS NOTES
Pharmacist Discharge Medication Reconciliation    Discharge Provider:  Marely Werner       Discharge Medications:      My Medications        START taking these medications        Instructions Each Dose to Equal Morning Noon Evening Bedtime   levoFLOXacin 750 mg tablet  Commonly known as: LEVAQUIN  Start taking on: February 28, 2022      Take 1 Tablet by mouth every fourty-eight (48) hours for 10 days. 750 mg                CONTINUE taking these medications        Instructions Each Dose to Equal Morning Noon Evening Bedtime   atorvastatin 10 mg tablet  Commonly known as: LIPITOR      Take 1 Tablet by mouth daily. 10 mg         donepeziL 5 mg tablet  Commonly known as: ARICEPT      Take 1 Tablet by mouth nightly. 5 mg         risperiDONE 0.5 mg tablet  Commonly known as: RisperDAL      Take 1 Tablet by mouth nightly. 0.5 mg         tamsulosin 0.4 mg capsule  Commonly known as: FLOMAX      Take 0.4 mg by mouth nightly. 0.4 mg         traZODone 50 mg tablet  Commonly known as: DESYREL      Take 50 mg by mouth nightly.    50 mg                   Where to Get Your Medications        These medications were sent to [address], [address] [place] [address] Drive  [address] [place] [id number]      Phone: [phone]   levoFLOXacin 750 mg tablet        The patient's chart, MAR, and AVS were reviewed by   Saundra Velasquez, ELIAZAR, BCPS

## 2022-02-27 NOTE — PROGRESS NOTES
Bedside shift change report given to [facility] (oncoming nurse) by Andrea Whtifield (offgoing nurse). Report included the following information SBAR, Kardex, Intake/Output, MAR and Recent Results.

## 2022-02-27 NOTE — DISCHARGE INSTRUCTIONS
-Please complete antibiotic course the next 10 days  -Please hold Aricept while taking levofloxacin to avoid potential interaction.   Resume Aricept after completing course of levofloxacin  -Please follow-up with primary care for repeat CBC test  -Please follow-up with neurology for the management of dementia    CM will call you tomorrow with a neurology follow up appointment

## 2022-02-27 NOTE — PROGRESS NOTES
NELDA  RUR 17  LOS 4d  ELOS Discharge expected today. Notified by Dr. Orlin Ni that patient is to be discharged home today. Wife will be here at 2pm to provide transportation home. Discussed 2nd IM letter.  these medications from [employer]), [address]  1 levoFLOXacin   [employer]    Follow up with Wilmer Crane MD on 3/13/2022   Specialty: Internal Medicine   [facility]   [address] [address]   [phone]   Apr13 Office Visit 9:45 AM  The Office to call for r an earlier date     Care Management Interventions  PCP Verified by CM:  Yes  Mode of Transport at Discharge: Self  Transition of Care Consult (CM Consult): Discharge Planning  Health Maintenance Reviewed: Yes  Support Systems: Spouse/Significant Other  Confirm Follow Up Transport: Family  The Plan for Transition of Care is Related to the Following Treatment Goals : F/U PCP appointment  The Patient and/or Patient Representative was Provided with a Choice of Provider and Agrees with the Discharge Plan?: Yes  Name of the Patient Representative Who was Provided with a Choice of Provider and Agrees with the Discharge Plan: Patient  Freedom of Choice List was Provided with Basic Dialogue that Supports the Patient's Individualized Plan of Care/Goals, Treatment Preferences and Shares the Quality Data Associated with the Providers?: Yes  Discharge Location  Patient Expects to be Discharged to[de-identified]  [facility])     SINAN Lopes/ARIELLE  [phone]

## 2022-02-27 NOTE — PROGRESS NOTES
0700) Bedside shift change report given to Roxana Parmar RN (oncoming nurse) by Gato Giraldo RN (offgoing nurse). Report included the following information SBAR, Kardex, Intake/Output and MAR. Pt asleep in bed at this time. 0911) Pt assessed and vital signs stable. Pt has no c/o pain at this time. Incontinence care provided. Scheduled meds given. Pt sitting up in bed eating and watching TV at this time. Pt stated no additional needs. 0935) Labs drawn and sent down. 56) Interdisciplinary team rounds were held 2/27/2022 with the following team members:Nursing and Physician. Plan of care discussed. See clinical pathway and/or care plan for interventions and desired outcomes. 200) Contacted pt's wife to notify of discharge. Wife stated she will be here around 2/2:30 for . Will complete discharge teaching when wife arrives. 1220) Pt repositioned in bed. No incontinence care required at this time. Pt left sitting up in bed eating at this time. 1415) Pt resting in bed at this time. Incontinence care given and pt assisted in call his wife. 1500) Pt wife arrived. Discharge papers reviewed with wife. Reviewed discharge instructions with pt including follow-up appointments, new medications and side effects, medications to continue, medications to discontinue, UTI education, signs/symptoms of stroke and heart attack, and MyChart information. Pt expressed understanding. Belongings sent home with pt.

## 2022-02-28 ENCOUNTER — PATIENT OUTREACH (OUTPATIENT)
Dept: CASE MANAGEMENT | Age: 72
End: 2022-02-28

## 2022-02-28 DIAGNOSIS — G24.01 TARDIVE DYSKINESIA: Primary | ICD-10-CM

## 2022-02-28 DIAGNOSIS — F02.818 DEMENTIA DUE TO ANOTHER GENERAL MEDICAL CONDITION, WITH BEHAVIORAL DISTURBANCE: ICD-10-CM

## 2022-02-28 LAB
BACTERIA SPEC CULT: NORMAL
BACTERIA SPEC CULT: NORMAL
SERVICE CMNT-IMP: NORMAL
SERVICE CMNT-IMP: NORMAL

## 2022-02-28 NOTE — PROGRESS NOTES
Care Transitions Initial Call    Call within 2 business days of discharge: Yes     Patient: Corry Mchugh Patient : 1950 MRN: [id number]    Last Discharge [address]       Complaint Diagnosis Description Type Department Provider    22 Altered mental status Severe sepsis ([facility] [address] . .. ED to Hosp-Admission (Discharged) (ADMIT) Zach Morales MD; Nehemiah Files... Was this an external facility discharge? No Discharge Facility: [facility]    Challenges to be reviewed by the provider   -discharging physician suggested patient see Neurology. If appropriate please send referral. Wife reports patient saw neurology years ago that has since retired. Method of communication with provider : chart routing    Discussed COVID-23 related testing which was not done at this time. Advance Care Planning:   Does patient have an Advance Directive:  currently not on file; patient declined education    Inpatient Readmission Risk score: Unplanned Readmit Risk Score: 15.8 ( )    Was this a readmission? yes   Patient stated reason for the admission: \"confusion\"    Patients top risk factors for readmission: none noted   Interventions to address risk factors: Scheduled appointment with PCP-3/01/2022 at 1:15    Care Transition Nurse (CTN) contacted the family by telephone to perform post hospital discharge assessment. Verified name and  with family as identifiers. Provided introduction to self, and explanation of the CTN role. CTN reviewed discharge instructions, medical action plan and red flags with family who verbalized understanding. Were discharge instructions available to patient? yes. Reviewed appropriate site of care based on symptoms and resources available to patient including: PCP and Specialist. Family given an opportunity to ask questions and does not have any further questions or concerns at this time. The family agrees to contact the PCP office for questions related to their healthcare. Medication reconciliation was performed with family, who verbalizes understanding of administration of home medications. Advised obtaining a 90-day supply of all daily and as-needed medications. Referral to Pharm D needed: no     Home Health/Outpatient orders at discharge: none    Durable Medical Equipment ordered at discharge: None      Covid Risk Education    Educated patient about risk for severe COVID-19 due to risk factors according to CDC guidelines. CTN reviewed discharge instructions, medical action plan and red flag symptoms with the family who verbalized understanding. Discussed COVID vaccination status: yes. Education provided on COVID-19 vaccination as appropriate. Discussed exposure protocols and quarantine with CDC Guidelines. Family was given an opportunity to verbalize any questions and concerns and agrees to contact CTN or health care provider for questions related to their healthcare. Was patient discharged with a pulse oximeter? no.       Discussed follow-up appointments. If no appointment was previously scheduled, appointment scheduling offered: yes. Is follow up appointment scheduled within 7 days of discharge? yes. [facility] follow up appointment(s):   Future Appointments   Date Time Provider Raad Albrecht   3/1/2022  1:15 PM Ramin Harris MD [facility] MAIN BS AMB   4/13/2022  9:45 AM Judy Lopez MD [facility] MAIN BS AMB     Non-[facility] follow up appointment(s): none    Plan for follow-up call in 5-7 days based on severity of symptoms and risk factors. Plan for next call: follow up   CTN provided contact information for future needs. Goals Addressed                 This Visit's Progress     Attends follow-up appointments as directed.         2/28/2022  -CTN scheduled NELDA follow up with PCP on 3/01/2022 at 1:15pm. Wife agreeable to time and will provide transportation.  -Will follow up with PCP about neurology referral  -CTN to follow up in 1 week  SP       Knowledge and adherence of prescribed medication (ie. action, side effects, missed dose, etc.).        2/28/2022  -CTN verified medication list with wife. Confirms she is to hold Aricept until he is finished his course of Levaquin.   -CTN to follow up in 1 week after PCP follow up   -SP

## 2022-03-01 ENCOUNTER — TELEPHONE (OUTPATIENT)
Dept: CASE MANAGEMENT | Age: 72
End: 2022-03-01

## 2022-03-01 ENCOUNTER — OFFICE VISIT (OUTPATIENT)
Dept: INTERNAL MEDICINE CLINIC | Age: 72
End: 2022-03-01
Payer: MEDICARE

## 2022-03-01 VITALS
SYSTOLIC BLOOD PRESSURE: 162 MMHG | DIASTOLIC BLOOD PRESSURE: 89 MMHG | BODY MASS INDEX: 17.61 KG/M2 | OXYGEN SATURATION: 97 % | TEMPERATURE: 98 F | RESPIRATION RATE: 18 BRPM | HEART RATE: 97 BPM | HEIGHT: 66 IN

## 2022-03-01 DIAGNOSIS — E78.5 DYSLIPIDEMIA: ICD-10-CM

## 2022-03-01 DIAGNOSIS — I10 PRIMARY HYPERTENSION: ICD-10-CM

## 2022-03-01 DIAGNOSIS — R68.89 OTHER GENERAL SYMPTOMS AND SIGNS: ICD-10-CM

## 2022-03-01 DIAGNOSIS — N18.31 STAGE 3A CHRONIC KIDNEY DISEASE (HCC): ICD-10-CM

## 2022-03-01 DIAGNOSIS — D69.6 THROMBOCYTOPENIA, UNSPECIFIED (HCC): ICD-10-CM

## 2022-03-01 DIAGNOSIS — R73.02 IGT (IMPAIRED GLUCOSE TOLERANCE): ICD-10-CM

## 2022-03-01 DIAGNOSIS — A41.9 SEPSIS WITH ACUTE RENAL FAILURE AND RENAL CORTICAL NECROSIS WITHOUT SEPTIC SHOCK, DUE TO UNSPECIFIED ORGANISM (HCC): Primary | ICD-10-CM

## 2022-03-01 DIAGNOSIS — S88.912D AMPUTATION OF BOTH LOWER EXTREMITIES, SUBSEQUENT ENCOUNTER (HCC): ICD-10-CM

## 2022-03-01 DIAGNOSIS — F02.818 DEMENTIA DUE TO ANOTHER GENERAL MEDICAL CONDITION, WITH BEHAVIORAL DISTURBANCE: ICD-10-CM

## 2022-03-01 DIAGNOSIS — R65.20 SEPSIS WITH ACUTE RENAL FAILURE AND RENAL CORTICAL NECROSIS WITHOUT SEPTIC SHOCK, DUE TO UNSPECIFIED ORGANISM (HCC): Primary | ICD-10-CM

## 2022-03-01 DIAGNOSIS — N17.1 SEPSIS WITH ACUTE RENAL FAILURE AND RENAL CORTICAL NECROSIS WITHOUT SEPTIC SHOCK, DUE TO UNSPECIFIED ORGANISM (HCC): Primary | ICD-10-CM

## 2022-03-01 DIAGNOSIS — S88.911D AMPUTATION OF BOTH LOWER EXTREMITIES, SUBSEQUENT ENCOUNTER (HCC): ICD-10-CM

## 2022-03-01 PROCEDURE — 99496 TRANSJ CARE MGMT HIGH F2F 7D: CPT | Performed by: INTERNAL MEDICINE

## 2022-03-01 PROCEDURE — G8427 DOCREV CUR MEDS BY ELIG CLIN: HCPCS | Performed by: INTERNAL MEDICINE

## 2022-03-01 NOTE — PROGRESS NOTES
Allyn Mahoney is a 80 y.o. male    Chief Complaint   Patient presents with   [facility] Follow Up     1. Have you been to the ER, urgent care clinic since your last visit? Hospitalized since your last visit? Yes at [facility] on February 27 2022 for dehydration     2. Have you seen or consulted any other health care providers outside of the [address] [place] [address] since your last visit? Include any pap smears or colon screening.   No

## 2022-03-01 NOTE — PROGRESS NOTES
SPORTS MEDICINE AND PRIMARY CARE  Kala Cespedes MD, [address] [place] [address],[address] [address]  Phone:  [phone]  Fax: [phone]       Chief Complaint   Patient presents with   [facility] Follow Up   . SUBJECTIVE:    Sridhar Tracy is a 80 y.o. male Patient returns today with his wife. He is unable to give an accurate history. She has called us because of concern over continued confusion that is certainly worse than what it was when he was hospitalized. This is a transition of care visit. Nurse navigator performed outreach to the patient on yesterday to complete medication reconciliation and assessment of his condition. Patient was admitted to [facility] on 02/23 and discharged on 02/27 with final diagnoses of sepsis, leukocytosis, UTI, pseudomonas and completed a course of levaquin  750 every 48 hours antibiotic course. He is following up with us to repeat a CBC to monitor white count to be sure he is improving. He was found to have acute kidney injury on admission superimposed upon his chronic kidney disease, which resolved. Tachycardia, as would be expected with sepsis, was also resolved, which was present on admission. He has a known history of primary hypertension, as well as bilateral BKA, and is seen for evaluation. Patient returns today with his wife, who notes that his confusion appears to be getting worse late afternoon to evening until he goes to bed. He has forgotten how to put his Depends on, he forgets how to put his clothes on, he tends to put his top shirt over his legs and head through the sleeves of his T-shirt. He looks directly at things and does not see them. He insists that his prosthetic leg was broken in pieces, but it is not. He is wanting his wife to do things that he has done for himself before for years, puts his personal parts in his Depends because he does not know how.   He calls her consistently, saying he needs help because the items, or whatever, are not acting right. He cannot remember how to make up his bed as he did daily for years. He does sleep good at night except for occasional talking or laughing in sleep. Patient is seen for evaluation. He recognizes me and knows the location. Current Outpatient Medications   Medication Sig Dispense Refill    levoFLOXacin (LEVAQUIN) 750 mg tablet Take 1 Tablet by mouth every fourty-eight (48) hours for 10 days. 5 Tablet 0    tamsulosin (FLOMAX) 0.4 mg capsule Take 0.4 mg by mouth nightly.  traZODone (DESYREL) 50 mg tablet Take 50 mg by mouth nightly.  atorvastatin (LIPITOR) 10 mg tablet Take 1 Tablet by mouth daily. 30 Tablet 11    risperiDONE (RisperDAL) 0.5 mg tablet Take 1 Tablet by mouth nightly. 30 Tablet 11    donepeziL (ARICEPT) 5 mg tablet Take 1 Tablet by mouth nightly.  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    Pancreatic mass     Prediabetes     Renal failure     Dr Claudia Luong S/P colonoscopy 1-4-08 2/26/19    verena.brett repeat 5 yrs    UTI (urinary tract infection)     Wound, open      Past Surgical History:   Procedure Laterality Date    COLONOSCOPY N/A 8/7/2018    COLONOSCOPY performed by Luis Khanna MD at [address] [place] [address]      bilateral amputation    HX UROLOGICAL      left orchiectomy    NEUROLOGICAL PROCEDURE UNLISTED  7/2004    aneursym    PA ABDOMEN SURGERY PROC UNLISTED      colonoscopy    PA CARDIAC SURG PROCEDURE UNLIST       No Known Allergies      REVIEW OF SYSTEMS:  General: negative for - chills or fever  ENT: negative for - headaches, nasal congestion or tinnitus  Respiratory: negative for - cough, hemoptysis, shortness of breath or wheezing  Cardiovascular : negative for - chest pain, edema, palpitations or shortness of breath  Gastrointestinal: negative for - abdominal pain, blood in stools, heartburn or nausea/vomiting  Genito-Urinary: no dysuria, trouble voiding, or hematuria  Musculoskeletal: negative for - gait disturbance, joint pain, joint stiffness or joint swelling  Neurological: no TIA or stroke symptoms  Hematologic: no bruises, no bleeding, no swollen glands  Integument: no lumps, mole changes, nail changes or rash  Endocrine: no malaise/lethargy or unexpected weight changes      Social History     Socioeconomic History    Marital status:    Tobacco Use    Smoking status: Former Smoker     Quit date: 2000     Years since quittin.5    Smokeless tobacco: Never Used   Vaping Use    Vaping Use: Never used   Substance and Sexual Activity    Alcohol use: Yes     Comment: ocassional, 3 wine coolers / year    Drug use: No    Sexual activity: Not Currently     Partners: Female     Family History   Problem Relation Age of Onset    No Known Problems Father        OBJECTIVE:    Visit Vitals  BP (!) 162/89 (BP 1 Location: Left upper arm, BP Patient Position: Sitting)   Pulse 97   Temp 98 °F (36.7 °C) (Oral)   Resp 18   Ht 5' 6\" (1.676 m)   SpO2 97%   BMI 17.61 kg/m²     CONSTITUTIONAL: well , well nourished, appears age appropriate  EYES: perrla, eom intact  ENMT:moist mucous membranes, pharynx clear  NECK: supple.  Thyroid normal  RESPIRATORY: Chest: clear bilaterally   CARDIOVASCULAR: Heart: regular rate and rhythm  GASTROINTESTINAL: Abdomen: soft, bowel sounds active  HEMATOLOGIC: no pathological lymph nodes palpated  MUSCULOSKELETAL: Extremities: no edema, pulse 1+   INTEGUMENT: No unusual rashes or suspicious skin lesions noted. Nails appear normal.  NEUROLOGIC: non-focal exam   MENTAL STATUS: alert and oriented, appropriate affect           ASSESSMENT:  1. Sepsis with acute renal failure and renal cortical necrosis without septic shock, due to unspecified organism ([facility]    2. Amputation of both lower extremities, subsequent encounter [facility]    3. Dyslipidemia    4. Stage 3a chronic kidney disease ([facility] [facility]    5. 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, with behavioral disturbance ([facility]    6. IGT (impaired glucose tolerance)    7. Primary hypertension    8. Thrombocytopenia, unspecified    9. Other general symptoms and signs       Patient is continuing Levaquin every other day to complete a 14 day course for the pseudomonas sepsis related to UTI. He has acute renal failure that presumably has resolved and we will check a renal panel to confirm that. I think it is the sepsis that is aggravating the underlying dementia. It is my hope that as the days go on that he will at least approach return of his baseline. However, in the interim she has requested, as well as the discharging physician from [facility] [place], a neurology consult, which we requested. He has bilateral amputation of both lower extremities related to gangrene. There is a history of dyslipidemia, for which his cholesterol is controlled with a statin. He has CKD stage 3A, although his last renal function was normal.      There is a history of dementia. We offer psychiatric referral.  She declines and would like to see a neurologist, she states. History of impaired glucose tolerance, which has been stable. Blood pressure control is adequate. Thrombocytopenia, for which we will check a CBC, as well as a gammopathy panel. We will also check a B12 level and ammonia level. He will be back to see me in three months, sooner if he has any problems, although will come back in April of the issues have not improved.       I have discussed the diagnosis with the patient and the intended plan as seen in the  Orders. The patient understands and agees with the plan. The patient has   received an after visit summary and questions were answered concerning  future plans  Patient labs and/or xrays were reviewed  Past records were reviewed. PLAN:  .  Orders Placed This Encounter    GAMMOPATHY EVAL, SPEP/MANI, IG QT/FLC    CBC WITH AUTOMATED DIFF    METABOLIC PANEL, COMPREHENSIVE    AMMONIA    VITAMIN B12 & FOLATE       Follow-up and Dispositions    · Return in about 3 months (around 6/1/2022). ATTENTION:   This medical record was transcribed using an electronic medical records system. Although proofread, it may and can contain electronic and spelling errors. Other human spelling and other errors may be present. Corrections may be executed at a later time. Please feel free to contact us for any clarifications as needed.

## 2022-03-03 LAB
ALBUMIN SERPL-MCNC: 3 G/DL (ref 3.5–5)
ALBUMIN/GLOB SERPL: 0.6 {RATIO} (ref 1.1–2.2)
ALP SERPL-CCNC: 99 U/L (ref 45–117)
ALT SERPL-CCNC: 48 U/L (ref 12–78)
AMMONIA PLAS-SCNC: 36 UMOL/L
ANION GAP SERPL CALC-SCNC: 6 MMOL/L (ref 5–15)
AST SERPL-CCNC: 53 U/L (ref 15–37)
BASOPHILS # BLD: 0.1 K/UL (ref 0–0.1)
BASOPHILS NFR BLD: 1 % (ref 0–1)
BILIRUB SERPL-MCNC: 0.4 MG/DL (ref 0.2–1)
BUN SERPL-MCNC: 11 MG/DL (ref 6–20)
BUN/CREAT SERPL: 10 (ref 12–20)
CALCIUM SERPL-MCNC: 10 MG/DL (ref 8.5–10.1)
CHLORIDE SERPL-SCNC: 106 MMOL/L (ref 97–108)
CO2 SERPL-SCNC: 28 MMOL/L (ref 21–32)
CREAT SERPL-MCNC: 1.09 MG/DL (ref 0.7–1.3)
DIFFERENTIAL METHOD BLD: ABNORMAL
EOSINOPHIL # BLD: 0.2 K/UL (ref 0–0.4)
EOSINOPHIL NFR BLD: 2 % (ref 0–7)
ERYTHROCYTE [DISTWIDTH] IN BLOOD BY AUTOMATED COUNT: 15.4 % (ref 11.5–14.5)
FOLATE SERPL-MCNC: 13.1 NG/ML (ref 5–21)
GLOBULIN SER CALC-MCNC: 4.7 G/DL (ref 2–4)
GLUCOSE SERPL-MCNC: 87 MG/DL (ref 65–100)
HCT VFR BLD AUTO: 29.6 % (ref 36.6–50.3)
HGB BLD-MCNC: 10 G/DL (ref 12.1–17)
IMM GRANULOCYTES # BLD AUTO: 0 K/UL
IMM GRANULOCYTES NFR BLD AUTO: 0 %
LYMPHOCYTES # BLD: 3.4 K/UL (ref 0.8–3.5)
LYMPHOCYTES NFR BLD: 29 % (ref 12–49)
MCH RBC QN AUTO: 25.1 PG (ref 26–34)
MCHC RBC AUTO-ENTMCNC: 33.8 G/DL (ref 30–36.5)
MCV RBC AUTO: 74.4 FL (ref 80–99)
MONOCYTES # BLD: 0.7 K/UL (ref 0–1)
MONOCYTES NFR BLD: 6 % (ref 5–13)
MYELOCYTES NFR BLD MANUAL: 2 %
NEUTS BAND NFR BLD MANUAL: 1 % (ref 0–6)
NEUTS SEG # BLD: 7.1 K/UL (ref 1.8–8)
NEUTS SEG NFR BLD: 59 % (ref 32–75)
NRBC # BLD: 0 K/UL (ref 0–0.01)
NRBC BLD-RTO: 0 PER 100 WBC
PLATELET # BLD AUTO: 168 K/UL (ref 150–400)
PMV BLD AUTO: 11.9 FL (ref 8.9–12.9)
POTASSIUM SERPL-SCNC: 4.5 MMOL/L (ref 3.5–5.1)
PROT SERPL-MCNC: 7.7 G/DL (ref 6.4–8.2)
RBC # BLD AUTO: 3.98 M/UL (ref 4.1–5.7)
RBC MORPH BLD: ABNORMAL
SODIUM SERPL-SCNC: 140 MMOL/L (ref 136–145)
VIT B12 SERPL-MCNC: 763 PG/ML (ref 193–986)
WBC # BLD AUTO: 11.8 K/UL (ref 4.1–11.1)

## 2022-03-04 NOTE — PROGRESS NOTES
The WBC is improving suggesting that he has responded to the antibiotics and the infection is improving. The remainder of the laboratory studies are stable.

## 2022-03-05 LAB
ALBUMIN SERPL ELPH-MCNC: 3.4 G/DL (ref 2.9–4.4)
ALBUMIN/GLOB SERPL: 0.9 {RATIO} (ref 0.7–1.7)
ALPHA1 GLOB SERPL ELPH-MCNC: 0.4 G/DL (ref 0–0.4)
ALPHA2 GLOB SERPL ELPH-MCNC: 0.8 G/DL (ref 0.4–1)
B-GLOBULIN SERPL ELPH-MCNC: 1.2 G/DL (ref 0.7–1.3)
GAMMA GLOB SERPL ELPH-MCNC: 1.6 G/DL (ref 0.4–1.8)
GLOBULIN SER-MCNC: 4 G/DL (ref 2.2–3.9)
IGA SERPL-MCNC: 377 MG/DL (ref 61–437)
IGG SERPL-MCNC: 1948 MG/DL (ref 603–1613)
IGM SERPL-MCNC: 73 MG/DL (ref 20–172)
INTERPRETATION SERPL IEP-IMP: ABNORMAL
KAPPA LC FREE SER-MCNC: 64.3 MG/L (ref 3.3–19.4)
KAPPA LC FREE/LAMBDA FREE SER: 1.39 {RATIO} (ref 0.26–1.65)
LAMBDA LC FREE SERPL-MCNC: 46.2 MG/L (ref 5.7–26.3)
M PROTEIN SERPL ELPH-MCNC: ABNORMAL G/DL
PROT SERPL-MCNC: 7.4 G/DL (ref 6–8.5)

## 2022-03-09 ENCOUNTER — PATIENT OUTREACH (OUTPATIENT)
Dept: CASE MANAGEMENT | Age: 72
End: 2022-03-09

## 2022-03-09 NOTE — PROGRESS NOTES
Care Transitions Follow Up Call    Challenges to be reviewed by the provider   Additional needs identified to be addressed with provider: no         Method of communication with provider : none    Care Transition Nurse (CTN) contacted the family by telephone to follow up after admission on 2022. Verified name and  with family as identifiers. Addressed changes since last contact: none  Follow up appointment completed? yes. Was follow up appointment scheduled within 7 days of discharge? yes. Advance Care Planning:   Does patient have an Advance Directive:  currently not on file; patient declined education    CTN reviewed discharge instructions, medical action plan and red flags with family and discussed any barriers to care and/or understanding of plan of care after discharge. Discussed appropriate site of care based on symptoms and resources available to patient including: PCP and Specialist. The family agrees to contact the PCP office for questions related to their healthcare. Patients top risk factors for readmission: none noted   Interventions to address risk factors: Obtained and reviewed discharge summary and/or continuity of care documents    [facility] follow up appointment(s):   Future Appointments   Date Time Provider Raad Albrecht   3/22/2022  9:30 AM Amanda Lopez MD [facility] MAIN BS AMB   2022  9:45 AM Savana Oneal MD [facility] MAIN BS AMB   2022 12:00 PM Brielle Gutiérrez MD [facility] BS AMB     Non-[facility] follow up appointment(s): None    CTN provided contact information for future needs. Plan for follow-up call in 7-10 days based on severity of symptoms and risk factors. Plan for next call: follow up      Goals Addressed                 This Visit's Progress     Attends follow-up appointments as directed.    On track     3/9/2022  -Attended follow up on 3/01  -Will attend Neurology appointment at the end of April  -Will follow up in 1 week  SP  2022  -CTN scheduled NELDA follow up with PCP on 3/01/2022 at 1:15pm. Wife agreeable to time and will provide transportation.  -Will follow up with PCP about neurology referral  -CTN to follow up in 1 week  SP       COMPLETED: Knowledge and adherence of prescribed medication (ie. action, side effects, missed dose, etc.).        2/28/2022  -CTN verified medication list with wife. Confirms she is to hold Aricept until he is finished his course of Levaquin.   -CTN to follow up in 1 week after PCP follow up   -SP

## 2022-03-18 ENCOUNTER — PATIENT OUTREACH (OUTPATIENT)
Dept: CASE MANAGEMENT | Age: 72
End: 2022-03-18

## 2022-03-18 PROBLEM — D69.6 THROMBOCYTOPENIA, UNSPECIFIED (HCC): Status: ACTIVE | Noted: 2022-03-01

## 2022-03-18 PROBLEM — A41.9 SEPSIS (HCC): Status: ACTIVE | Noted: 2022-01-28

## 2022-03-19 PROBLEM — G24.01 TARDIVE DYSKINESIA: Status: ACTIVE | Noted: 2021-12-13

## 2022-03-19 PROBLEM — K40.90 HERNIA, INGUINAL, LEFT: Status: ACTIVE | Noted: 2019-04-01

## 2022-03-19 PROBLEM — F02.818: Status: ACTIVE | Noted: 2019-04-25

## 2022-03-22 ENCOUNTER — OFFICE VISIT (OUTPATIENT)
Dept: INTERNAL MEDICINE CLINIC | Age: 72
End: 2022-03-22
Payer: MEDICARE

## 2022-03-22 VITALS
OXYGEN SATURATION: 98 % | HEIGHT: 66 IN | RESPIRATION RATE: 20 BRPM | DIASTOLIC BLOOD PRESSURE: 82 MMHG | BODY MASS INDEX: 17.61 KG/M2 | HEART RATE: 82 BPM | SYSTOLIC BLOOD PRESSURE: 134 MMHG | TEMPERATURE: 97.5 F

## 2022-03-22 DIAGNOSIS — N18.31 STAGE 3A CHRONIC KIDNEY DISEASE (HCC): ICD-10-CM

## 2022-03-22 DIAGNOSIS — S88.912D AMPUTATION OF BOTH LOWER EXTREMITIES, SUBSEQUENT ENCOUNTER (HCC): ICD-10-CM

## 2022-03-22 DIAGNOSIS — F02.818 DEMENTIA DUE TO ANOTHER GENERAL MEDICAL CONDITION, WITH BEHAVIORAL DISTURBANCE: ICD-10-CM

## 2022-03-22 DIAGNOSIS — E78.5 DYSLIPIDEMIA: ICD-10-CM

## 2022-03-22 DIAGNOSIS — S88.911D AMPUTATION OF BOTH LOWER EXTREMITIES, SUBSEQUENT ENCOUNTER (HCC): ICD-10-CM

## 2022-03-22 DIAGNOSIS — I10 PRIMARY HYPERTENSION: Primary | ICD-10-CM

## 2022-03-22 DIAGNOSIS — R73.02 IGT (IMPAIRED GLUCOSE TOLERANCE): ICD-10-CM

## 2022-03-22 PROCEDURE — G8754 DIAS BP LESS 90: HCPCS | Performed by: INTERNAL MEDICINE

## 2022-03-22 PROCEDURE — 99214 OFFICE O/P EST MOD 30 MIN: CPT | Performed by: INTERNAL MEDICINE

## 2022-03-22 PROCEDURE — G8427 DOCREV CUR MEDS BY ELIG CLIN: HCPCS | Performed by: INTERNAL MEDICINE

## 2022-03-22 PROCEDURE — G8419 CALC BMI OUT NRM PARAM NOF/U: HCPCS | Performed by: INTERNAL MEDICINE

## 2022-03-22 PROCEDURE — 1111F DSCHRG MED/CURRENT MED MERGE: CPT | Performed by: INTERNAL MEDICINE

## 2022-03-22 PROCEDURE — G8753 SYS BP > OR = 140: HCPCS | Performed by: INTERNAL MEDICINE

## 2022-03-22 PROCEDURE — 3017F COLORECTAL CA SCREEN DOC REV: CPT | Performed by: INTERNAL MEDICINE

## 2022-03-22 PROCEDURE — G8432 DEP SCR NOT DOC, RNG: HCPCS | Performed by: INTERNAL MEDICINE

## 2022-03-22 PROCEDURE — 1101F PT FALLS ASSESS-DOCD LE1/YR: CPT | Performed by: INTERNAL MEDICINE

## 2022-03-22 PROCEDURE — G8536 NO DOC ELDER MAL SCRN: HCPCS | Performed by: INTERNAL MEDICINE

## 2022-03-22 RX ORDER — RISPERIDONE 0.5 MG/1
0.5 TABLET, FILM COATED ORAL 2 TIMES DAILY
Qty: 60 TABLET | Refills: 11 | Status: SHIPPED | OUTPATIENT
Start: 2022-03-22

## 2022-03-22 NOTE — PROGRESS NOTES
SPORTS MEDICINE AND PRIMARY CARE  Gagan Elias MD, [address] [address]  Phone:  [phone]  Fax: [phone]      Chief Complaint   Patient presents with    Hypertension         SUBECTIVE:    Daniela Lorenzo is a 80 y.o. male Patient returns today with his wife with a history of primary hypertension, her concern for confusion, impaired glucose tolerance, delusional disorder, dementia, chronic kidney disease, dyslipidemia, bilateral amputations below the knee and is seen for evaluation. Patient returns today with a prescription for [facility] with diagnosis of left below the knee amputation, left socket replacement. Medical justification for the item prescribed:  \"Patient has increased volume, can no longer assist with transfers or standing due to decreased contractures, patient requires appropriate fitting socket. \"  Patient discontinued smoking and alcohol abuse. Current Outpatient Medications   Medication Sig Dispense Refill    risperiDONE (RisperDAL) 0.5 mg tablet Take 1 Tablet by mouth two (2) times a day. Take 1 tablet at 4:00 PM and 1 tablet at bedtime 60 Tablet 11    tamsulosin (FLOMAX) 0.4 mg capsule Take 0.4 mg by mouth nightly.  traZODone (DESYREL) 50 mg tablet Take 50 mg by mouth nightly.  atorvastatin (LIPITOR) 10 mg tablet Take 1 Tablet by mouth daily. 30 Tablet 11    donepeziL (ARICEPT) 5 mg tablet Take 1 Tablet by mouth nightly.  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  Pancreatic mass     Prediabetes     Renal failure     Dr Stanley Raygoza S/P colonoscopy 08    ryan repeat 5 yrs    UTI (urinary tract infection)     Wound, open      Past Surgical History:   Procedure Laterality Date    COLONOSCOPY N/A 2018    COLONOSCOPY performed by Kellie Sahni MD at [address]      bilateral amputation    HX UROLOGICAL      left orchiectomy    NEUROLOGICAL PROCEDURE UNLISTED  2004    aneursym    PA ABDOMEN SURGERY PROC UNLISTED      colonoscopy    PA CARDIAC SURG PROCEDURE UNLIST       No Known Allergies    REVIEW OF SYSTEMS:   No chest pain or shortness of breath. Social History     Socioeconomic History    Marital status:    Tobacco Use    Smoking status: Former Smoker     Quit date: 2000     Years since quittin.6    Smokeless tobacco: Never Used   Vaping Use    Vaping Use: Never used   Substance and Sexual Activity    Alcohol use: Yes     Comment: ocassional, 3 wine coolers / year    Drug use: No    Sexual activity: Not Currently     Partners: Female   Social History Narrative    Mother  80 uti dementia 3/22    His oldest daughter's mother  4 days apart. Habits: Patient is patient patient discontinued smoking and alcohol abuse        Social history patient is  lives with his wife ( has 2 children) for the past  21 years     Patient and father 3 children and is has been on disability since his amputation. 2 of \"brothers and 2 sisters. r  Family History   Problem Relation Age of Onset    No Known Problems Father        OBJECTIVE:  Visit Vitals  BP (!) 144/88   Pulse 82   Temp 97.5 °F (36.4 °C) (Oral)   Resp 20   Ht 5' 6\" (1.676 m)   SpO2 98%   BMI 17.61 kg/m²     ENT: perrla,  eom intact  NECK: supple.  Thyroid normal  CHEST: clear to ascultation and percussion   HEART: regular rate and rhythm  ABD: soft, bowel sounds active  EXTREMITIES: no edema, pulse 1+     Office Visit on 03/01/2022   Component Date Value Ref Range Status    Vitamin B12 03/01/2022 763  193 - 986 pg/mL Final    Folate 03/01/2022 13.1  5.0 - 21.0 ng/mL Final    SPECIMEN HEMOLYZED, RESULTS MAY BE AFFECTED    Sodium 03/01/2022 140  136 - 145 mmol/L Final    Potassium 03/01/2022 4.5  3.5 - 5.1 mmol/L Final    SPECIMEN HEMOLYZED, RESULTS MAY BE AFFECTED    Chloride 03/01/2022 106  97 - 108 mmol/L Final    CO2 03/01/2022 28  21 - 32 mmol/L Final    Anion gap 03/01/2022 6  5 - 15 mmol/L Final    Glucose 03/01/2022 87  65 - 100 mg/dL Final    BUN 03/01/2022 11  6 - 20 MG/DL Final    Creatinine 03/01/2022 1.09  0.70 - 1.30 MG/DL Final    BUN/Creatinine ratio 03/01/2022 10* 12 - 20   Final    GFR est AA 03/01/2022 >60  >60 ml/min/1.73m2 Final    GFR est non-AA 03/01/2022 >60  >60 ml/min/1.73m2 Final    Comment: Estimated GFR is calculated using the IDMS-traceable Modification of Diet in  Renal Disease (MDRD) Study equation, reported for both  Americans  (GFRAA) and non- Americans (GFRNA), and normalized to 1.73m2 body  surface area. The physician must decide which value applies to the patient.  Calcium 03/01/2022 10.0  8.5 - 10.1 MG/DL Final    Bilirubin, total 03/01/2022 0.4  0.2 - 1.0 MG/DL Final    ALT (SGPT) 03/01/2022 48  12 - 78 U/L Final    AST (SGOT) 03/01/2022 53* 15 - 37 U/L Final    SPECIMEN HEMOLYZED, RESULTS MAY BE AFFECTED    Alk.  phosphatase 03/01/2022 99  45 - 117 U/L Final    Protein, total 03/01/2022 7.7  6.4 - 8.2 g/dL Final    Albumin 03/01/2022 3.0* 3.5 - 5.0 g/dL Final    Globulin 03/01/2022 4.7* 2.0 - 4.0 g/dL Final    A-G Ratio 03/01/2022 0.6* 1.1 - 2.2   Final    WBC 03/01/2022 11.8* 4.1 - 11.1 K/uL Final    RBC 03/01/2022 3.98* 4.10 - 5.70 M/uL Final    HGB 03/01/2022 10.0* 12.1 - 17.0 g/dL Final    HCT 03/01/2022 29.6* 36.6 - 50.3 % Final    MCV 03/01/2022 74.4* 80.0 - 99.0 FL Final    MCH 03/01/2022 25.1* 26.0 - 34.0 PG Final    MCHC 03/01/2022 33.8 30.0 - 36.5 g/dL Final    RDW 03/01/2022 15.4* 11.5 - 14.5 % Final    PLATELET 45/12/6584 118  150 - 400 K/uL Final    MPV 03/01/2022 11.9  8.9 - 12.9 FL Final    NRBC 03/01/2022 0.0  0  WBC Final    ABSOLUTE NRBC 03/01/2022 0.00  0.00 - 0.01 K/uL Final    NEUTROPHILS 03/01/2022 59  32 - 75 % Final    BAND NEUTROPHILS 03/01/2022 1  0 - 6 % Final    LYMPHOCYTES 03/01/2022 29  12 - 49 % Final    MONOCYTES 03/01/2022 6  5 - 13 % Final    EOSINOPHILS 03/01/2022 2  0 - 7 % Final    BASOPHILS 03/01/2022 1  0 - 1 % Final    MYELOCYTES 03/01/2022 2* 0 % Final    IMMATURE GRANULOCYTES 03/01/2022 0  % Final    ABS. NEUTROPHILS 03/01/2022 7.1  1.8 - 8.0 K/UL Final    ABS. LYMPHOCYTES 03/01/2022 3.4  0.8 - 3.5 K/UL Final    ABS. MONOCYTES 03/01/2022 0.7  0.0 - 1.0 K/UL Final    ABS. EOSINOPHILS 03/01/2022 0.2  0.0 - 0.4 K/UL Final    ABS. BASOPHILS 03/01/2022 0.1  0.0 - 0.1 K/UL Final    ABS. IMM. GRANS. 03/01/2022 0.0  K/UL Final    DF 03/01/2022 MANUAL    Final    RBC COMMENTS 03/01/2022     Final                    Value:ANISOCYTOSIS  1+      Immunoglobulin G, Qt. 03/01/2022 1,948* 603 - 1,613 mg/dL Final    Immunoglobulin A, Qt. 03/01/2022 377  61 - 437 mg/dL Final    Immunoglobulin M, Qt. 03/01/2022 73  20 - 172 mg/dL Final    Protein, total 03/01/2022 7.4  6.0 - 8.5 g/dL Final    Albumin 03/01/2022 3.4  2.9 - 4.4 g/dL Final    Alpha-1-Globulin 03/01/2022 0.4  0.0 - 0.4 g/dL Final    Alpha-2-Globulin 03/01/2022 0.8  0.4 - 1.0 g/dL Final    Beta Globulin 03/01/2022 1.2  0.7 - 1.3 g/dL Final    Gamma Globulin 03/01/2022 1.6  0.4 - 1.8 g/dL Final    M-Virgilio 03/01/2022 Not Observed  Not Observed g/dL Final    Globulin, total 03/01/2022 4.0* 2.2 - 3.9 g/dL Final    A/G ratio 03/01/2022 0.9  0.7 - 1.7   Final    Immunofixation Result 03/01/2022 Comment*   Final    Comment: (NOTE)  Polyclonal increase detected in one or more immunoglobulins.       Free Kappa Lt Chains, serum 03/01/2022 64.3* 3.3 - 19.4 mg/L Final    Free Lambda Lt Chains, serum 03/01/2022 46.2* 5.7 - 26.3 mg/L Final    Kappa/Lambda ratio, serum 03/01/2022 1.39  0.26 - 1.65   Final    Comment: (NOTE)  Performed At: [facility] & [address] [id number]  John Chapa MD FP:[id number]      Ammonia, plasma 03/01/2022 36* <32 UMOL/L Final    Comment: Ammonia determinations are subject to marked lability. Upon standing, ammonia  levels increase rapidly due to red cell metabolism. Concentrations may more  than double in plasma if sample is stored at room temperature for 6 hours. No results displayed because visit has over 200 results. Admission on 01/28/2022, Discharged on 01/31/2022   Component Date Value Ref Range Status    WBC 01/28/2022 22.0* 4.1 - 11.1 K/uL Final    Comment: RESULTS VERIFIED, PHONED TO AND READ BACK BY  ANGÉLICA ZAMAN AT 1330/TN      RBC 01/28/2022 5.35  4.10 - 5.70 M/uL Final    HGB 01/28/2022 13.3  12.1 - 17.0 g/dL Final    HCT 01/28/2022 38.6  36.6 - 50.3 % Final    MCV 01/28/2022 72.1* 80.0 - 99.0 FL Final    MCH 01/28/2022 24.9* 26.0 - 34.0 PG Final    MCHC 01/28/2022 34.5  30.0 - 36.5 g/dL Final    RDW 01/28/2022 14.6* 11.5 - 14.5 % Final    PLATELET 23/00/0915 964* 150 - 400 K/uL Final    MPV 01/28/2022 11.7  8.9 - 12.9 FL Final    NRBC 01/28/2022 0.0  0  WBC Final    ABSOLUTE NRBC 01/28/2022 0.00  0.00 - 0.01 K/uL Final    NEUTROPHILS 01/28/2022 80* 32 - 75 % Final    LYMPHOCYTES 01/28/2022 9* 12 - 49 % Final    MONOCYTES 01/28/2022 9  5 - 13 % Final    EOSINOPHILS 01/28/2022 0  0 - 7 % Final    BASOPHILS 01/28/2022 0  0 - 1 % Final    IMMATURE GRANULOCYTES 01/28/2022 1* 0.0 - 0.5 % Final    ABS. NEUTROPHILS 01/28/2022 17.7* 1.8 - 8.0 K/UL Final    ABS. LYMPHOCYTES 01/28/2022 2.0  0.8 - 3.5 K/UL Final    ABS. MONOCYTES 01/28/2022 2.0* 0.0 - 1.0 K/UL Final    ABS. EOSINOPHILS 01/28/2022 0.0  0.0 - 0.4 K/UL Final    ABS.  BASOPHILS 01/28/2022 0.1  0.0 - 0.1 K/UL Final    ABS. IMM. GRANS. 01/28/2022 0.2* 0.00 - 0.04 K/UL Final    DF 01/28/2022 AUTOMATED    Final    Sodium 01/28/2022 133* 136 - 145 mmol/L Final    Potassium 01/28/2022 3.8  3.5 - 5.1 mmol/L Final    Chloride 01/28/2022 100  97 - 108 mmol/L Final    CO2 01/28/2022 29  21 - 32 mmol/L Final    Anion gap 01/28/2022 4* 5 - 15 mmol/L Final    Glucose 01/28/2022 122* 65 - 100 mg/dL Final    BUN 01/28/2022 34* 6 - 20 MG/DL Final    Creatinine 01/28/2022 1.84* 0.70 - 1.30 MG/DL Final    BUN/Creatinine ratio 01/28/2022 18  12 - 20   Final    GFR est AA 01/28/2022 44* >60 ml/min/1.73m2 Final    GFR est non-AA 01/28/2022 37* >60 ml/min/1.73m2 Final    Estimated GFR is calculated using the IDMS-traceable Modification of Diet in Renal Disease (MDRD) Study equation, reported for both  Americans (GFRAA) and non- Americans (GFRNA), and normalized to 1.73m2 body surface area. The physician must decide which value applies to the patient.  Calcium 01/28/2022 9.9  8.5 - 10.1 MG/DL Final    Bilirubin, total 01/28/2022 1.0  0.2 - 1.0 MG/DL Final    ALT (SGPT) 01/28/2022 17  12 - 78 U/L Final    AST (SGOT) 01/28/2022 27  15 - 37 U/L Final    Alk.  phosphatase 01/28/2022 109  45 - 117 U/L Final    Protein, total 01/28/2022 8.6* 6.4 - 8.2 g/dL Final    Albumin 01/28/2022 3.2* 3.5 - 5.0 g/dL Final    Globulin 01/28/2022 5.4* 2.0 - 4.0 g/dL Final    A-G Ratio 01/28/2022 0.6* 1.1 - 2.2   Final    Color 01/28/2022 DARK YELLOW    Final    Color Reference Range: Straw, Yellow or Dark Yellow    Appearance 01/28/2022 CLOUDY* CLEAR   Final    Specific gravity 01/28/2022 1.020  1.003 - 1.030   Final    pH (UA) 01/28/2022 5.5  5.0 - 8.0   Final    Protein 01/28/2022 100* NEG mg/dL Final    Glucose 01/28/2022 Negative  NEG mg/dL Final    Ketone 01/28/2022 Negative  NEG mg/dL Final    Blood 01/28/2022 SMALL* NEG   Final    Urobilinogen 01/28/2022 1.0  0.2 - 1.0 EU/dL Final    Nitrites 01/28/2022 Positive* NEG   Final    Leukocyte Esterase 01/28/2022 MODERATE* NEG   Final    WBC 01/28/2022 10-20  0 - 4 /hpf Final    RBC 01/28/2022 0-5  0 - 5 /hpf Final    Epithelial cells 01/28/2022 FEW  FEW /lpf Final    Epithelial cell category consists of squamous cells and /or transitional urothelial cells. Renal tubular cells, if present, are separately identified as such.  Bacteria 01/28/2022 2+* NEG /hpf Final    UA:UC IF INDICATED 01/28/2022 URINE CULTURE ORDERED* CNI   Final    SARS-CoV-2 by PCR 01/28/2022 Not detected  NOTD   Final    Not Detected results do not preclude SARS-CoV-2 infection and should not be used as the sole basis for patient management decisions. Results must be combined with clinical observations, patient history, and epidemiological information.  Influenza A by PCR 01/28/2022 Not detected    Final    Influenza B by PCR 01/28/2022 Not detected    Final    Comment: Testing was performed using rod Dulce Maria SARS-CoV-2 and Influenza A/B nucleic acid assay. This test is a multiplex Real-Time Reverse Transcriptase Polymerase Chain Reaction (RT-PCR) based in vitro diagnostic test intended for the qualitative detection of nucleic acids from SARS-CoV-2, Influenza A, and Influenza B in nasopharyngeal and nasal swab specimens for use under the FDA's Emergency Use Authorization (EUA) only.        Fact sheet for Patients: FindDrives.pl  Fact sheet for Healthcare Providers: FindDrives.pl      Bilirubin UA, confirm 01/28/2022 Negative  NEG   Final    Special Requests: 01/28/2022     Final                    Value:NO SPECIAL REQUESTS  Reflexed from [id number]      Deming Count 01/28/2022     Final                    Value:>100,000  COLONIES/mL      Culture result: 01/28/2022 ENTEROBACTER CLOACAE COMPLEX*   Final    Lactic acid 01/28/2022 1.0  0.4 - 2.0 MMOL/L Final    CK 01/28/2022 206  39 - 308 U/L Final    Special Requests: 01/28/2022 NO SPECIAL REQUESTS    Final    Culture result: 01/28/2022 NO GROWTH 6 DAYS    Final    Special Requests: 01/28/2022 NO SPECIAL REQUESTS    Final    Culture result: 01/28/2022 ENTEROBACTER CLOACAE COMPLEX GROWING IN 1 OF 2 BOTTLES DRAWN R FA SITE*   Final    Culture result: 01/28/2022 REMAINING BOTTLE(S) HAS/HAVE NO GROWTH IN 5 DAYS    Final    Culture result: 01/28/2022 (NOTE) PRELIMINARY REPORT OF GRAM NEGATIVE RODS GROWING IN 1 OF 2 BOTTLES DRAWN. CALLED TO AND READ BACK BY MR BENEDICTO RN ON 1/30/22 AT 1743. MS   Final    WBC 01/29/2022 14.4* 4.1 - 11.1 K/uL Final    RBC 01/29/2022 4.24  4.10 - 5.70 M/uL Final    HGB 01/29/2022 10.7* 12.1 - 17.0 g/dL Final    INVESTIGATED PER DELTA CHECK PROTOCOL    HCT 01/29/2022 30.4* 36.6 - 50.3 % Final    MCV 01/29/2022 71.7* 80.0 - 99.0 FL Final    MCH 01/29/2022 25.2* 26.0 - 34.0 PG Final    MCHC 01/29/2022 35.2  30.0 - 36.5 g/dL Final    RDW 01/29/2022 14.6* 11.5 - 14.5 % Final    PLATELET 02/08/7075 749* 150 - 400 K/uL Final    MPV 01/29/2022 11.5  8.9 - 12.9 FL Final    NRBC 01/29/2022 0.0  0  WBC Final    ABSOLUTE NRBC 01/29/2022 0.00  0.00 - 0.01 K/uL Final    NEUTROPHILS 01/29/2022 76* 32 - 75 % Final    LYMPHOCYTES 01/29/2022 14  12 - 49 % Final    MONOCYTES 01/29/2022 7  5 - 13 % Final    EOSINOPHILS 01/29/2022 2  0 - 7 % Final    BASOPHILS 01/29/2022 1  0 - 1 % Final    IMMATURE GRANULOCYTES 01/29/2022 1* 0.0 - 0.5 % Final    ABS. NEUTROPHILS 01/29/2022 11.0* 1.8 - 8.0 K/UL Final    ABS. LYMPHOCYTES 01/29/2022 2.0  0.8 - 3.5 K/UL Final    ABS. MONOCYTES 01/29/2022 1.0  0.0 - 1.0 K/UL Final    ABS. EOSINOPHILS 01/29/2022 0.3  0.0 - 0.4 K/UL Final    ABS. BASOPHILS 01/29/2022 0.1  0.0 - 0.1 K/UL Final    ABS. IMM.  GRANS. 01/29/2022 0.1* 0.00 - 0.04 K/UL Final    DF 01/29/2022 AUTOMATED    Final    Sodium 01/29/2022 135* 136 - 145 mmol/L Final    Potassium 01/29/2022 3.7  3.5 - 5.1 mmol/L Final    Chloride 01/29/2022 105  97 - 108 mmol/L Final    CO2 01/29/2022 25  21 - 32 mmol/L Final    Anion gap 01/29/2022 5  5 - 15 mmol/L Final    Glucose 01/29/2022 107* 65 - 100 mg/dL Final    BUN 01/29/2022 24* 6 - 20 MG/DL Final    Creatinine 01/29/2022 1.15  0.70 - 1.30 MG/DL Final    BUN/Creatinine ratio 01/29/2022 21* 12 - 20   Final    GFR est AA 01/29/2022 >60  >60 ml/min/1.73m2 Final    GFR est non-AA 01/29/2022 >60  >60 ml/min/1.73m2 Final    Calcium 01/29/2022 8.7  8.5 - 10.1 MG/DL Final    Magnesium 01/29/2022 1.8  1.6 - 2.4 mg/dL Final    Phosphorus 01/29/2022 2.5* 2.6 - 4.7 MG/DL Final    Protein, total 01/29/2022 7.0  6.4 - 8.2 g/dL Final    Albumin 01/29/2022 2.4* 3.5 - 5.0 g/dL Final    Globulin 01/29/2022 4.6* 2.0 - 4.0 g/dL Final    A-G Ratio 01/29/2022 0.5* 1.1 - 2.2   Final    Bilirubin, total 01/29/2022 0.6  0.2 - 1.0 MG/DL Final    Bilirubin, direct 01/29/2022 0.2  0.0 - 0.2 MG/DL Final    Alk. phosphatase 01/29/2022 89  45 - 117 U/L Final    AST (SGOT) 01/29/2022 28  15 - 37 U/L Final    ALT (SGPT) 01/29/2022 17  12 - 78 U/L Final    WBC 01/30/2022 8.4  4.1 - 11.1 K/uL Final    RBC 01/30/2022 4.41  4.10 - 5.70 M/uL Final    HGB 01/30/2022 11.1* 12.1 - 17.0 g/dL Final    HCT 01/30/2022 32.1* 36.6 - 50.3 % Final    MCV 01/30/2022 72.8* 80.0 - 99.0 FL Final    MCH 01/30/2022 25.2* 26.0 - 34.0 PG Final    MCHC 01/30/2022 34.6  30.0 - 36.5 g/dL Final    RDW 01/30/2022 14.7* 11.5 - 14.5 % Final    PLATELET 87/53/8776 743* 150 - 400 K/uL Final    MPV 01/30/2022 10.8  8.9 - 12.9 FL Final    NRBC 01/30/2022 0.0  0  WBC Final    ABSOLUTE NRBC 01/30/2022 0.00  0.00 - 0.01 K/uL Final    NEUTROPHILS 01/30/2022 62  32 - 75 % Final    LYMPHOCYTES 01/30/2022 23  12 - 49 % Final    MONOCYTES 01/30/2022 10  5 - 13 % Final    EOSINOPHILS 01/30/2022 3  0 - 7 % Final    BASOPHILS 01/30/2022 1  0 - 1 % Final    IMMATURE GRANULOCYTES 01/30/2022 1* 0.0 - 0.5 % Final    ABS.  NEUTROPHILS 01/30/2022 5.2  1.8 - 8.0 K/UL Final    ABS. LYMPHOCYTES 01/30/2022 1.9  0.8 - 3.5 K/UL Final    ABS. MONOCYTES 01/30/2022 0.8  0.0 - 1.0 K/UL Final    ABS. EOSINOPHILS 01/30/2022 0.3  0.0 - 0.4 K/UL Final    ABS. BASOPHILS 01/30/2022 0.1  0.0 - 0.1 K/UL Final    ABS. IMM. GRANS. 01/30/2022 0.1* 0.00 - 0.04 K/UL Final    DF 01/30/2022 AUTOMATED    Final    Sodium 01/30/2022 137  136 - 145 mmol/L Final    Potassium 01/30/2022 4.0  3.5 - 5.1 mmol/L Final    Chloride 01/30/2022 106  97 - 108 mmol/L Final    CO2 01/30/2022 24  21 - 32 mmol/L Final    Anion gap 01/30/2022 7  5 - 15 mmol/L Final    Glucose 01/30/2022 90  65 - 100 mg/dL Final    BUN 01/30/2022 15  6 - 20 MG/DL Final    Creatinine 01/30/2022 1.04  0.70 - 1.30 MG/DL Final    BUN/Creatinine ratio 01/30/2022 14  12 - 20   Final    GFR est AA 01/30/2022 >60  >60 ml/min/1.73m2 Final    GFR est non-AA 01/30/2022 >60  >60 ml/min/1.73m2 Final    Calcium 01/30/2022 9.5  8.5 - 10.1 MG/DL Final    Magnesium 01/30/2022 1.8  1.6 - 2.4 mg/dL Final    Phosphorus 01/30/2022 2.9  2.6 - 4.7 MG/DL Final          ASSESSMENT:  1. Primary hypertension    2. IGT (impaired glucose tolerance)    3. 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 Stage 3a chronic kidney disease ([facility]    5. Dyslipidemia    6. [facility] of both lower extremities, [facility]      Repeat blood pressure is acceptable at 134/82, no titration is needed. Impaired glucose tolerance has been stable. Dementia with typical sundowning, around 3:30-4:00 in the afternoon he becomes more confused, which lasts throughout the night. So we will increase the Risperdal to 0.5 mg at 4:00 and then 0.5 mg at h.s. and see if that helps his wife in taking care of him. There is a history of chronic kidney disease stage 3. Dyslipidemia is controlled with a statin. We filled the prescription for his appliance for .     He will be back to see me in three to four months, sooner if he has any problems. I have discussed the diagnosis with the patient and the intended plan as seen in the  orders above. The patient understands and agees with the plan. The patient has   received an after visit summary and questions were answered concerning  future plans  Patient labs and/or xrays were reviewed  Past records were reviewed. PLAN:  .  Orders Placed This Encounter    risperiDONE (RisperDAL) 0.5 mg tablet                     ATTENTION:   This medical record was transcribed using an electronic medical records system. Although proofread, it may and can contain electronic and spelling errors. Other human spelling and other errors may be present. Corrections may be executed at a later time. Please feel free to contact us for any clarifications as needed.

## 2022-03-22 NOTE — PROGRESS NOTES
1. Have you been to the ER, urgent care clinic since your last visit? Hospitalized since your last visit? No    2. Have you seen or consulted any other health care providers outside of the [address] [place] [address] since your last visit? Include any pap smears or colon screening.  No    Patient has a note

## 2022-03-29 ENCOUNTER — PATIENT OUTREACH (OUTPATIENT)
Dept: CASE MANAGEMENT | Age: 72
End: 2022-03-29

## 2022-03-29 NOTE — PROGRESS NOTES
Patient has graduated from the Transitions of Care Coordination  program on 3/29/2022. Patient/family has the ability to self-manage at this time Care management goals have been completed. Patient was not referred to the [facility] team for further management. Goals Addressed                 This Visit's Progress     COMPLETED: Attends follow-up appointments as directed. 3/9/2022  -Attended follow up on 3/01  -Will attend Neurology appointment at the end of April  -Will follow up in 1 week  SP  2/28/2022  -CTN scheduled NELDA follow up with PCP on 3/01/2022 at 1:15pm. Wife agreeable to time and will provide transportation.  -Will follow up with PCP about neurology referral  -CTN to follow up in 1 week  SP            Patient has Care Transition Nurse's contact information for any further questions, concerns, or needs.   Patients upcoming visits:    Future Appointments   Date Time Provider Raad Albrecht   4/20/2022 12:00 PM Tram Gutiérrez MD [facility] BS AMB   7/22/2022 10:30 AM Lior Lopez MD [facility] MAIN BS AMB Show Applicator Variable?: Yes Consent: The patient's verbal consent was obtained including but not limited to risks of crusting, scabbing, blistering, scarring, darker or lighter pigmentary change, recurrence, incomplete removal and infection. Duration Of Freeze Thaw-Cycle (Seconds): 0 Post-Care Instructions: I reviewed with the patient in detail post-care instructions. Patient is to wear sunprotection, and avoid picking at any of the treated lesions.  Pt may apply Vaseline to crusted or scabbing areas and cover as needed Render Note In Bullet Format When Appropriate: No Detail Level: Detailed Medical Necessity Information: It is in your best interest to select a reason for this procedure from the list below. All of these items fulfill various CMS LCD requirements except the new and changing color options. Post-Care Instructions: I reviewed with the patient in detail post-care instructions. Patient is to wear sunprotection, and avoid picking at any of the treated lesions. Pt may apply Vaseline to crusted or scabbing areas. Spray Paint Text: The liquid nitrogen was applied to the skin utilizing a spray paint frosting technique.

## 2022-04-20 ENCOUNTER — OFFICE VISIT (OUTPATIENT)
Dept: NEUROLOGY | Age: 72
End: 2022-04-20
Payer: MEDICARE

## 2022-04-20 VITALS
HEIGHT: 67 IN | BODY MASS INDEX: 17.09 KG/M2 | HEART RATE: 80 BPM | OXYGEN SATURATION: 99 % | DIASTOLIC BLOOD PRESSURE: 90 MMHG | SYSTOLIC BLOOD PRESSURE: 138 MMHG | RESPIRATION RATE: 16 BRPM

## 2022-04-20 DIAGNOSIS — R40.4 ALTERED SENSORIUM: Primary | ICD-10-CM

## 2022-04-20 PROCEDURE — G8432 DEP SCR NOT DOC, RNG: HCPCS | Performed by: PSYCHIATRY & NEUROLOGY

## 2022-04-20 PROCEDURE — G8752 SYS BP LESS 140: HCPCS | Performed by: PSYCHIATRY & NEUROLOGY

## 2022-04-20 PROCEDURE — G8428 CUR MEDS NOT DOCUMENT: HCPCS | Performed by: PSYCHIATRY & NEUROLOGY

## 2022-04-20 PROCEDURE — G8419 CALC BMI OUT NRM PARAM NOF/U: HCPCS | Performed by: PSYCHIATRY & NEUROLOGY

## 2022-04-20 PROCEDURE — 99203 OFFICE O/P NEW LOW 30 MIN: CPT | Performed by: PSYCHIATRY & NEUROLOGY

## 2022-04-20 PROCEDURE — G8755 DIAS BP > OR = 90: HCPCS | Performed by: PSYCHIATRY & NEUROLOGY

## 2022-04-20 PROCEDURE — 3017F COLORECTAL CA SCREEN DOC REV: CPT | Performed by: PSYCHIATRY & NEUROLOGY

## 2022-04-20 PROCEDURE — 1101F PT FALLS ASSESS-DOCD LE1/YR: CPT | Performed by: PSYCHIATRY & NEUROLOGY

## 2022-04-20 PROCEDURE — G8536 NO DOC ELDER MAL SCRN: HCPCS | Performed by: PSYCHIATRY & NEUROLOGY

## 2022-04-20 NOTE — PROGRESS NOTES
[facility]   NEW PATIENT EVALUATION/CONSULTATION       PATIENT NAME: Chantelle Gill    MRN: [id number]    REASON FOR CONSULTATION: Tardive dyskinesia    04/20/22    HISTORY OF PRESENT ILLNESS:  Chantelle Gill is a 80 y.o. right-hand-dominant male has a complicated past medical history referred to [place] [facility] with a listed reason for referral of tardive dyskinesia. Patient is reported to have been on risperidone for some period of time previously seen by psychiatrist for unknown reasons. More recently risperidone is reported to been increased to address sundowning. Over the past year or so wife has noted him engage in some sort of their typical rocking motion of his leg and body there is not appear to be any tremor or orofacial dyskinesias reported. History is somewhat limited as neither patient nor wife are particularly florid in the description. Patient denies any internal sensation of restlessness denies any internal desire to move alleviated by these movements. Main concern is his confusion which apparently worsens in the setting of recurrent hospitalizations this year for urinary tract infection and dehydration.       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    Pancreatic mass     Prediabetes     Renal failure     Dr Amy Best S/P colonoscopy 1-4-08 2/26/19    ryan repeat 5 yrs    UTI (urinary tract infection)     Wound, open        PAST SURGICAL HISTORY:  Past Surgical History: Procedure Laterality Date    COLONOSCOPY N/A 2018    COLONOSCOPY performed by Daphney Zelaya MD at [facility] HX ORTHOPAEDIC      bilateral amputation    HX UROLOGICAL      left orchiectomy    NEUROLOGICAL PROCEDURE UNLISTED  2004    aneursym    ND ABDOMEN SURGERY PROC UNLISTED      colonoscopy    ND CARDIAC SURG PROCEDURE UNLIST         FAMILY HISTORY:   Family History   Problem Relation Age of Onset    No Known Problems Father          SOCIAL HISTORY:  Social History     Socioeconomic History    Marital status:    Tobacco Use    Smoking status: Former Smoker     Quit date: 2000     Years since quittin.7    Smokeless tobacco: Never Used   Vaping Use    Vaping Use: Never used   Substance and Sexual Activity    Alcohol use: Yes     Comment: ocassional, 3 wine coolers / year    Drug use: No    Sexual activity: Not Currently     Partners: Female   Social History Narrative    Mother  80 uti dementia 3/22    His oldest daughter's mother  4 days apart. Habits: Patient is patient patient discontinued smoking and alcohol abuse        Social history patient is  lives with his wife ( has 2 children) for the past  21 years     Patient and father 3 children and is has been on disability since his amputation. 2 of \"brothers and 2 sisters. MEDICATIONS:   Current Outpatient Medications   Medication Sig Dispense Refill    risperiDONE (RisperDAL) 0.5 mg tablet Take 1 Tablet by mouth two (2) times a day. Take 1 tablet at 4:00 PM and 1 tablet at bedtime 60 Tablet 11    tamsulosin (FLOMAX) 0.4 mg capsule Take 0.4 mg by mouth nightly.  atorvastatin (LIPITOR) 10 mg tablet Take 1 Tablet by mouth daily. 30 Tablet 11    donepeziL (ARICEPT) 5 mg tablet Take 1 Tablet by mouth nightly. 30 Tablet 11    traZODone (DESYREL) 50 mg tablet Take 50 mg by mouth nightly.  (Patient not taking: Reported on 2022)           ALLERGIES:  No Known Allergies      REVIEW OF SYSTEMS:  10 point ROS reviewed with patient. Please see scanned document under media. PHYSICAL EXAM:  Vital Signs:   Visit Vitals  BP (!) 138/90   Pulse 80   Resp 16   Ht 5' 7\" (1.702 m)   SpO2 99%   BMI 17.09 kg/m²     Elderly male sitting in wheelchair in no clear distress frequently rocking his left leg as well as body at times. HEENT appears grossly unrevealing neck appears supple. Cardiopulmonary exams appear unremarkable observation. Abdomen is nontender/nondistended. Extremities are warm/dry patient has bilateral leg amputations. Neurologically patient is oriented to self knows it is where he is oriented to month and date not day. States years is not 20. Attention appears impaired to casual conversation. Speech is clear, language is intermittently fluent. Naming is reasonably intact. Attention is impaired to simple tasks. Luria sequencing is impaired. Cranial nerves II through XII appear grossly unremarkable. Motorically patient has 4 to 4+ out of 5 strength in bilateral upper extremities. Sensation appears grossly intact. Trung Needle is intact in upper extremities. PERTINENT DATA:  B12 763    CT Results (maximum last 3): Results from [place] encounter on 02/23/22    CT ABD PELV WO CONT    Narrative  EXAM: CT ABD PELV WO CONT    INDICATION: Eval for obstruction, pyelonephritis    COMPARISON: January 28, 2022. IV CONTRAST: None. ORAL CONTRAST: None    TECHNIQUE:  Thin axial images were obtained through the abdomen and pelvis. Coronal and  sagittal reformats were generated. CT dose reduction was achieved through use of  a standardized protocol tailored for this examination and automatic exposure  control for dose modulation. The absence of intravenous contrast material reduces the sensitivity for  evaluation of the vasculature and solid organs. FINDINGS:  LOWER THORAX: Atelectasis right lung base. Severe scoliosis. LIVER: No mass.   BILIARY TREE: Gallbladder is within normal limits. CBD is not dilated. SPLEEN: within normal limits. PANCREAS: No focal abnormality. ADRENALS: Unremarkable. KIDNEYS/URETERS: No calculus or hydronephrosis. STOMACH: Unremarkable. SMALL BOWEL: No dilatation or wall thickening. COLON: No dilatation or wall thickening. Sigmoid diverticulosis. PERITONEUM: No ascites or pneumoperitoneum. RETROPERITONEUM: No lymphadenopathy or aortic aneurysm. URINARY BLADDER: Bladder wall thickening with prostate hypertrophy. BONES: No destructive bone lesion. ABDOMINAL WALL: No mass or hernia. ADDITIONAL COMMENTS: N/A    Impression  No acute findings seen. CT HEAD WO CONT    Narrative  EXAM: CT HEAD WO CONT    INDICATION: Increased confusion and lethargy, likely sepsis, assess for acute  intracranial pathology    COMPARISON: None. CONTRAST: None. TECHNIQUE: Unenhanced CT of the head was performed using 5 mm images. Brain and  bone windows were generated. CT dose reduction was achieved through use of a  standardized protocol tailored for this examination and automatic exposure  control for dose modulation. FINDINGS:  Postsurgical change is associated with the right side of the skull. The two  aneurysm clips in the vicinity of the right side of the sella turcica are again  noted. There is no evidence of intracranial hemorrhage. The previously described  areas of encephalomalacic change in the right frontal and left occipital regions  are again noted and are unchanged in appearance. As seen on axial image 6, small  osteomata are noted in the anterior aspect of both sides of the ethmoid sinus    Impression  1. No evidence of intracranial hemorrhage. 2. Stable appearance of the previously described areas of encephalomalacic  change in the right frontal and occipital regions. 3. Evidence of postsurgical change involving the right side of the skull. Presence of the two previously described aneurysm clips.       Results from Hospital Encounter encounter on 01/28/22    CT ABD PELV WO CONT    Narrative  EXAM: CT ABD PELV WO CONT    INDICATION: evaluate for calculus, obstruction, pyelonephritis . History of  renal failure, pancreatic mass    COMPARISON: 3/27/2012    IV CONTRAST: None. ORAL CONTRAST: None. TECHNIQUE:  Thin axial images were obtained through the abdomen and pelvis. Coronal and  sagittal reformats were generated. CT dose reduction was achieved through use of  a standardized protocol tailored for this examination and automatic exposure  control for dose modulation. The absence of intravenous contrast material reduces the sensitivity for  evaluation of the vasculature and solid organs. FINDINGS:  LOWER THORAX: Small right pleural effusion. LIVER: No solid mass. Small right hepatic lobe cyst.  BILIARY TREE: Gallbladder is within normal limits. CBD is not dilated. SPLEEN: within normal limits. PANCREAS: No focal abnormality. ADRENALS: Unremarkable. KIDNEYS/URETERS: No calculus or hydronephrosis. STOMACH: Unremarkable. SMALL BOWEL: No dilatation or wall thickening. Small bowel loop barely entering  a right inguinal hernia without associated obstruction. COLON: No dilatation or wall thickening. Numerous diverticula without associated  acute inflammatory change. APPENDIX: Not seen  PERITONEUM: No ascites or pneumoperitoneum. RETROPERITONEUM: No lymphadenopathy or aortic aneurysm. IVC filter stable. REPRODUCTIVE ORGANS: Unremarkable for age with prostatic enlargement. URINARY BLADDER: No mass or calculus. BONES: No destructive bone lesion. Slight worsening of thoracolumbar  dextroscoliosis since the prior study. ABDOMINAL WALL: Right inguinal hernia containing fluid, fat and one small bowel  loop without associated incarceration. ADDITIONAL COMMENTS: N/A    Impression  1. No CT evidence for urolithiasis or urinary obstruction at this time.  No  obvious parenchymal abnormality to suggest pyelonephritis, but accurate  diagnosis of pyelonephritis would require intravenous contrast. Correlate with  urinalysis and clinical parameters. 2. Small right pleural effusion, new since prior study. 3. Worsening of thoracolumbar dextrocurvature since prior study. 4. Small right inguinal hernia containing a loop of small bowel without  associated obstruction/incarceration. 5. Other incidental and postoperative changes.     ASSESSMENT:      PLAN:  Tardive dyskinesia:  Do not appreciate significant degree of oral facial or lingual component of tardive dyskinesia and there is occasional chewing movement though does not appear particularly dramatic  Patient largely appears to engage in a habitual stereopy of rocking like motion, fidgetiness without clear tardive like movements  Patient denies any internal sensation of restlessness to suggest akathisia  There is no premonitory urge to move which is alleviated by movement to support tic  Other than perhaps considering weaning down risperidone and transitioning to alternative behavioral therapy such as valproic acid do not have any recommendations for this habitual movement    Dementia:  Patient appears to have difficulty with memory, some degree of executive dysfunction as well as sundowning  Appears to have worsened in the setting of repeated recent hospitalizations  Family thinks this is why they were sent here though Dr. Tasha Espinoza appears to be managing it well patient is on Aricept but recommend increasing and adding memantine as felt necessary by Dr. Tasha Espinoza  Will obtain EEG and MRI (the latter if able given history of aneurysm with clipping) to verify/evaluate for any additional causes to his cognitive decline  Discussed lifestyle measures to address cognitive health/aging    Can follow-up in 6 to 8 weeks to review studies      Arti Gordillo MD       CC Referring provider:  Robyn Serrano MD  [place] [address]  [address]Sujit Silva [address]    Tasha Espinoza Tristan Vicente MD

## 2022-05-19 ENCOUNTER — HOSPITAL ENCOUNTER (OUTPATIENT)
Dept: NEUROLOGY | Age: 72
Discharge: HOME | End: 2022-05-19
Attending: PSYCHIATRY & NEUROLOGY
Payer: MEDICARE

## 2022-05-19 DIAGNOSIS — F02.818 DEMENTIA DUE TO ANOTHER GENERAL MEDICAL CONDITION, WITH BEHAVIORAL DISTURBANCE: ICD-10-CM

## 2022-05-19 DIAGNOSIS — R40.4 ALTERED SENSORIUM: ICD-10-CM

## 2022-05-19 PROCEDURE — 95819 EEG AWAKE AND ASLEEP: CPT | Performed by: PSYCHIATRY & NEUROLOGY

## 2022-05-19 PROCEDURE — 95816 EEG AWAKE AND DROWSY: CPT

## 2022-05-20 NOTE — PROCEDURES
[address]  EEG    Name:  Joe Lowery  MR#:  [id number]  :  1950  ACCOUNT #:  [de-identified]  DATE OF SERVICE:  2022    CLINICAL INDICATION:  The patient is a 73-year-old male with a history of altered mental status. The patient with possible spells, possible encephalopathy. EEG to rule out seizures, rule out cortical abnormality, rule out epilepsy. EEG CLASSIFICATION:  On this patient is dysrhythmia grade II, generalized. DESCRIPTION OF THE RECORD:  This is a 16-channel EEG recording on the patient to rule out encephalopathy or seizures. This study had a posteriorly located occipital alpha rhythm of 8-9 Hz that did attenuate slightly with eye opening. Throughout the recording, there is a moderate increase in some generalized slow-wave activity seen, but no clear areas of focal slowing, no clear spike or spike-and-wave discharges and no electrographic spells of any type were seen. The patient did enter states of sleep with K complexes and sleep spindles seen in the central head regions. Hyperventilation was not performed. Photic stimulation produced a minimal driving response in the posterior head regions. INTERPRETATION:  This is a mildly abnormal electroencephalogram due to the generalized slowing seen most consistent with diffuse encephalopathy of toxic, metabolic or degenerative type. Clinical correlation recommended. No clear focal process identified. No spike or spike-and-wave discharges seen. Vaibhav Luis MD      [id number]  D:  2022 23:03  T:  2022 2:16  JOB #:  [id number]  CC:   Severiano Bernard MD

## 2022-06-07 RX ORDER — TAMSULOSIN HYDROCHLORIDE 0.4 MG/1
0.4 CAPSULE ORAL
Qty: 90 CAPSULE | Refills: 3 | Status: SHIPPED | OUTPATIENT
Start: 2022-06-07

## 2022-07-22 ENCOUNTER — OFFICE VISIT (OUTPATIENT)
Dept: INTERNAL MEDICINE CLINIC | Age: 72
End: 2022-07-22
Payer: MEDICARE

## 2022-07-22 VITALS
HEIGHT: 67 IN | HEART RATE: 85 BPM | TEMPERATURE: 98 F | RESPIRATION RATE: 18 BRPM | SYSTOLIC BLOOD PRESSURE: 137 MMHG | BODY MASS INDEX: 17.09 KG/M2 | OXYGEN SATURATION: 95 % | DIASTOLIC BLOOD PRESSURE: 86 MMHG

## 2022-07-22 DIAGNOSIS — S88.911D AMPUTATION OF BOTH LOWER EXTREMITIES, SUBSEQUENT ENCOUNTER (HCC): ICD-10-CM

## 2022-07-22 DIAGNOSIS — R73.02 IGT (IMPAIRED GLUCOSE TOLERANCE): ICD-10-CM

## 2022-07-22 DIAGNOSIS — E78.5 DYSLIPIDEMIA: ICD-10-CM

## 2022-07-22 DIAGNOSIS — S88.912D AMPUTATION OF BOTH LOWER EXTREMITIES, SUBSEQUENT ENCOUNTER (HCC): ICD-10-CM

## 2022-07-22 DIAGNOSIS — Z00.00 MEDICARE ANNUAL WELLNESS VISIT, SUBSEQUENT: Primary | ICD-10-CM

## 2022-07-22 DIAGNOSIS — G24.01 TARDIVE DYSKINESIA: ICD-10-CM

## 2022-07-22 DIAGNOSIS — F02.818 DEMENTIA DUE TO ANOTHER GENERAL MEDICAL CONDITION, WITH BEHAVIORAL DISTURBANCE: ICD-10-CM

## 2022-07-22 DIAGNOSIS — I10 PRIMARY HYPERTENSION: ICD-10-CM

## 2022-07-22 DIAGNOSIS — Z13.39 SCREENING FOR ALCOHOLISM: ICD-10-CM

## 2022-07-22 DIAGNOSIS — N18.31 STAGE 3A CHRONIC KIDNEY DISEASE (HCC): ICD-10-CM

## 2022-07-22 PROCEDURE — 3017F COLORECTAL CA SCREEN DOC REV: CPT | Performed by: INTERNAL MEDICINE

## 2022-07-22 PROCEDURE — G8419 CALC BMI OUT NRM PARAM NOF/U: HCPCS | Performed by: INTERNAL MEDICINE

## 2022-07-22 PROCEDURE — G8427 DOCREV CUR MEDS BY ELIG CLIN: HCPCS | Performed by: INTERNAL MEDICINE

## 2022-07-22 PROCEDURE — 1101F PT FALLS ASSESS-DOCD LE1/YR: CPT | Performed by: INTERNAL MEDICINE

## 2022-07-22 PROCEDURE — G8754 DIAS BP LESS 90: HCPCS | Performed by: INTERNAL MEDICINE

## 2022-07-22 PROCEDURE — G8752 SYS BP LESS 140: HCPCS | Performed by: INTERNAL MEDICINE

## 2022-07-22 PROCEDURE — G8536 NO DOC ELDER MAL SCRN: HCPCS | Performed by: INTERNAL MEDICINE

## 2022-07-22 PROCEDURE — 99213 OFFICE O/P EST LOW 20 MIN: CPT | Performed by: INTERNAL MEDICINE

## 2022-07-22 PROCEDURE — G8432 DEP SCR NOT DOC, RNG: HCPCS | Performed by: INTERNAL MEDICINE

## 2022-07-22 PROCEDURE — 1123F ACP DISCUSS/DSCN MKR DOCD: CPT | Performed by: INTERNAL MEDICINE

## 2022-07-22 PROCEDURE — G0439 PPPS, SUBSEQ VISIT: HCPCS | Performed by: INTERNAL MEDICINE

## 2022-07-22 RX ORDER — DONEPEZIL HYDROCHLORIDE 10 MG/1
10 TABLET, FILM COATED ORAL
Qty: 30 TABLET | Refills: 11 | Status: SHIPPED | OUTPATIENT
Start: 2022-07-22

## 2022-07-22 RX ORDER — MEMANTINE HYDROCHLORIDE 5 MG/1
5 TABLET ORAL DAILY
Qty: 60 TABLET | Refills: 2 | Status: SHIPPED | OUTPATIENT
Start: 2022-07-22 | End: 2022-11-01 | Stop reason: SDUPTHER

## 2022-07-22 RX ORDER — MEMANTINE HYDROCHLORIDE 5 MG-10 MG
KIT ORAL
Qty: 60 TABLET | Refills: 1 | Status: SHIPPED | OUTPATIENT
Start: 2022-07-22 | End: 2022-07-22

## 2022-07-22 NOTE — PATIENT INSTRUCTIONS
Medicare Wellness Visit, Male    The best way to live healthy is to have a lifestyle where you eat a well-balanced diet, exercise regularly, limit alcohol use, and quit all forms of tobacco/nicotine, if applicable. Regular preventive services are another way to keep healthy. Preventive services (vaccines, screening tests, monitoring & exams) can help personalize your care plan, which helps you manage your own care. Screening tests can find health problems at the earliest stages, when they are easiest to treat. [facility] follows the current, evidence-based guidelines published by the [facility] ([facility]) when recommending preventive services for our patients. Because we follow these guidelines, sometimes recommendations change over time as research supports it. (For example, a prostate screening blood test is no longer routinely recommended for men with no symptoms). Of course, you and your doctor may decide to screen more often for some diseases, based on your risk and co-morbidities (chronic disease you are already diagnosed with). Preventive services for you include:  - Medicare offers their members a free annual wellness visit, which is time for you and your primary care provider to discuss and plan for your preventive service needs. Take advantage of this benefit every year!  -All adults over age 72 should receive the recommended pneumonia vaccines. Current USPSTF guidelines recommend a series of two vaccines for the best pneumonia protection.   -All adults should have a flu vaccine yearly and tetanus vaccine every 10 years.  -All adults age 48 and older should receive the shingles vaccines (series of two vaccines).        -All adults age 38-68 who are overweight should have a diabetes screening test once every three years.   -Other screening tests & preventive services for persons with diabetes include: an eye exam to screen for diabetic retinopathy, a kidney function test, a foot exam, and stricter control over your cholesterol.   -Cardiovascular screening for adults with routine risk involves an electrocardiogram (ECG) at intervals determined by the provider.   -Colorectal cancer screening should be done for adults age 54-65 with no increased risk factors for colorectal cancer. There are a number of acceptable methods of screening for this type of cancer. Each test has its own benefits and drawbacks. Discuss with your provider what is most appropriate for you during your annual wellness visit. The different tests include: colonoscopy (considered the best screening method), a fecal occult blood test, a fecal DNA test, and sigmoidoscopy.  -All adults born between [facility] should be screened once for Hepatitis C.  -An Abdominal Aortic Aneurysm (AAA) Screening is recommended for men age 73-68 who has ever smoked in their lifetime.      Here is a list of your current Health Maintenance items (your personalized list of preventive services) with a due date:  Health Maintenance Due   Topic Date Due    Pneumococcal Vaccine (1 - PCV) Never done    COVID-19 Vaccine (4 - Booster for Pfizer series) 03/09/2022

## 2022-07-22 NOTE — PROGRESS NOTES
Identified pt with two pt identifiers(name and ). Chief Complaint   Patient presents with    Cholesterol Problem      Vitals:    22 1045   BP: 137/86   Pulse: 85   Resp: 18   Temp: 98 °F (36.7 °C)   TempSrc: Oral   SpO2: 95%   Height: 5' 7\" (1.702 m)   PainSc:   0 - No pain      Health Maintenance Due   Topic    Pneumococcal 65+ years (1 - PCV)    COVID-19 Vaccine (4 - Booster for Werner Peter series)    Medicare Yearly Exam        Depression Screening:  :     3 most recent PHQ Screens 2022   PHQ Not Done -   Little interest or pleasure in doing things Not at all   Feeling down, depressed, irritable, or hopeless Not at all   Total Score PHQ 2 0   Trouble falling or staying asleep, or sleeping too much -   Feeling tired or having little energy -   Poor appetite, weight loss, or overeating -   Feeling bad about yourself - or that you are a failure or have let yourself or your family down -   Trouble concentrating on things such as school, work, reading, or watching TV -   Moving or speaking so slowly that other people could have noticed; or the opposite being so fidgety that others notice -   Thoughts of being better off dead, or hurting yourself in some way -   PHQ 9 Score -   How difficult have these problems made it for you to do your work, take care of your home and get along with others -        Fall Risk Assessment:  :     Fall Risk Assessment, last 12 mths 3/22/2022   Able to walk? No   Fall in past 12 months? -   Do you feel unsteady? -   Are you worried about falling -       Abuse Screening:  :     Abuse Screening Questionnaire 2018   Do you ever feel afraid of your partner? N   Are you in a relationship with someone who physically or mentally threatens you? N   Is it safe for you to go home? Y        Coordination of Care Questionnaire:  :     1. Have you been to the ER, urgent care clinic since your last visit? Hospitalized since your last visit? No    2.  Have you seen or consulted any other health care providers outside of the [address] [place] [address] since your last visit? Include any pap smears or colon screening.  No

## 2022-07-22 NOTE — PROGRESS NOTES
SPORTS MEDICINE AND PRIMARY CARE  Gilmar Betancourt MD, [address] [address]  Phone:  [phone]  Fax: [phone]       Chief Complaint   Patient presents with    Cholesterol Problem   . SUBJECTIVE:    Lona Noyola is a 71 y.o. male Patient returns today with his wife and since we last saw him, he was seen by [facility] for evaluation and was found to have tardive dyskinesia and appeared to engage in habitual stereotypy of rocking-like motion, fidgetiness, without clear tardive-like movements. Continued on Risperdal and transitioned to alternative behavioral therapy, such as valproic acid, and did not have any other recommendations for this uncontrolled movement. The patient has dementia and difficulty with memory, some degree of executive function, as well as sundowning. It appears to be worsened in the setting of repeat hospitalization. He is on Aricept and recommended increasing the medication and Namenda. Will obtain EEG and MRI, particularly with the history of aneurysm clipping to evaluate for any additional causes of his cognitive decline. Wife states he still has moments of confusion, sleeps all night with no problems, and she is appreciative of neurology's assistance. Current Outpatient Medications   Medication Sig Dispense Refill    donepeziL (ARICEPT) 10 mg tablet Take 1 Tablet by mouth nightly. 30 Tablet 11    memantine (Namenda Titration Anup) 5-10 mg DsPk Titrate / instructions 60 Tablet 1    tamsulosin (FLOMAX) 0.4 mg capsule Take 1 Capsule by mouth nightly. 90 Capsule 3    risperiDONE (RisperDAL) 0.5 mg tablet Take 1 Tablet by mouth two (2) times a day. Take 1 tablet at 4:00 PM and 1 tablet at bedtime 60 Tablet 11    atorvastatin (LIPITOR) 10 mg tablet Take 1 Tablet by mouth daily. 30 Tablet 11    traZODone (DESYREL) 50 mg tablet Take 50 mg by mouth nightly.  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 ([facility] 04/19/2019    Elton Monsivais MD    Dyslipidemia     Gangrene ([facility] 2011    bilateral amputation d/t gangrene    Hallucination     Hernia, inguinal, left 04/01/2019    Hypertension     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    Pancreatic mass     Prediabetes     Renal failure     Dr Scarlett Willoughby    S/P colonoscopy 1-4-08 2/26/19    ryan repeat 5 yrs    UTI (urinary tract infection)     Wound, open      Past Surgical History:   Procedure Laterality Date    COLONOSCOPY N/A 8/7/2018    COLONOSCOPY performed by Melchor Lemus MD at [facility]      bilateral amputation    HX UROLOGICAL      left orchiectomy    NEUROLOGICAL PROCEDURE UNLISTED  7/2004    aneursym    VT ABDOMEN SURGERY PROC UNLISTED      colonoscopy    VT CARDIAC SURG PROCEDURE UNLIST       No Known Allergies      REVIEW OF SYSTEMS:  General: negative for - chills or fever  ENT: negative for - headaches, nasal congestion or tinnitus  Respiratory: negative for - cough, hemoptysis, shortness of breath or wheezing  Cardiovascular : negative for - chest pain, edema, palpitations or shortness of breath  Gastrointestinal: negative for - abdominal pain, blood in stools, heartburn or nausea/vomiting  Genito-Urinary: no dysuria, trouble voiding, or hematuria  Musculoskeletal: negative for - gait disturbance, joint pain, joint stiffness or joint swelling  Neurological: no TIA or stroke symptoms  Hematologic: no bruises, no bleeding, no swollen glands  Integument: no lumps, mole changes, nail changes or rash  Endocrine: no malaise/lethargy or unexpected weight changes      Social History     Socioeconomic History    Marital status:    Tobacco Use    Smoking status: Former     Types: Cigarettes     Quit date: 2000     Years since quittin.9    Smokeless tobacco: Never   Vaping Use    Vaping Use: Never used   Substance and Sexual Activity    Alcohol use: Yes     Comment: ocassional, 3 wine coolers / year    Drug use: No    Sexual activity: Not Currently     Partners: Female   Social History Narrative    Mother  80 uti dementia 3/22    His oldest daughter's mother  4 days apart. Habits: Patient is patient patient discontinued smoking and alcohol abuse        Social history patient is  lives with his wife ( has 2 children) for the past  21 years     Patient and father 3 children and is has been on disability since his amputation. 2 of \"brothers and 2 sisters. Family History   Problem Relation Age of Onset    No Known Problems Father        OBJECTIVE:    Visit Vitals  /86 (BP 1 Location: Right arm, BP Patient Position: Sitting, BP Cuff Size: Small adult)   Pulse 85   Temp 98 °F (36.7 °C) (Oral)   Resp 18   Ht 5' 7\" (1.702 m)   SpO2 95%   BMI 17.09 kg/m²     CONSTITUTIONAL: well , well nourished, appears age appropriate  EYES: perrla, eom intact  ENMT:moist mucous membranes, pharynx clear  NECK: supple. Thyroid normal  RESPIRATORY: Chest: clear bilaterally   CARDIOVASCULAR: Heart: regular rate and rhythm  GASTROINTESTINAL: Abdomen: soft, bowel sounds active  HEMATOLOGIC: no pathological lymph nodes palpated  MUSCULOSKELETAL: Extremities: no edema, pulse 1+   INTEGUMENT: No unusual rashes or suspicious skin lesions noted. Nails appear normal.  NEUROLOGIC: non-focal exam   MENTAL STATUS: alert and oriented, appropriate affect           ASSESSMENT:  1. Medicare annual wellness visit, subsequent    2. Screening for alcoholism    3. Primary hypertension    4. Amputation of both lower extremities, subsequent encounter ([facility]    5. IGT (impaired glucose tolerance)    6. 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, with behavioral disturbance ([address]    7. Tardive dyskinesia    8. Dyslipidemia    9. Stage 3a chronic kidney disease ([facility]      Blood pressure control is at goal per recommendations. His wife took him to get his prosthesis fixed correctly and now he will not wear it. We encourage him to start wearing his prosthesis so he does not forget how to use it. He has impaired glucose tolerance, which has been stable. We appreciate neurologist's assistance and recommendations. We will add Namenda. He is currently taking Aricept 10 mg and we give him a 10 mg tablet and we will give him a Namenda dose pack and we will titrate him ourselves. Dyslipidemia is controlled with a statin. CKD stage 3A is stable. We encouraged fluids, which he is not drinking. He will be back to see us in four months, sooner if any problems. I have discussed the diagnosis with the patient and the intended plan as seen in the  Orders. The patient understands and agees with the plan. The patient has   received an after visit summary and questions were answered concerning  future plans  Patient labs and/or xrays were reviewed  Past records were reviewed. PLAN:  .  Orders Placed This Encounter    donepeziL (ARICEPT) 10 mg tablet    memantine (Namenda Titration Anup) 5-10 mg DsPk       Follow-up and Dispositions    Return in about 4 months (around 11/22/2022). ATTENTION:   This medical record was transcribed using an electronic medical records system. Although proofread, it may and can contain electronic and spelling errors. Other human spelling and other errors may be present. Corrections may be executed at a later time. Please feel free to contact us for any clarifications as needed.

## 2022-07-25 ENCOUNTER — OFFICE VISIT (OUTPATIENT)
Dept: NEUROLOGY | Age: 72
End: 2022-07-25
Payer: MEDICARE

## 2022-07-25 VITALS
DIASTOLIC BLOOD PRESSURE: 86 MMHG | SYSTOLIC BLOOD PRESSURE: 138 MMHG | HEART RATE: 89 BPM | OXYGEN SATURATION: 96 % | RESPIRATION RATE: 18 BRPM

## 2022-07-25 DIAGNOSIS — F03.90 DEMENTIA WITHOUT BEHAVIORAL DISTURBANCE, UNSPECIFIED DEMENTIA TYPE: Primary | ICD-10-CM

## 2022-07-25 PROCEDURE — G8419 CALC BMI OUT NRM PARAM NOF/U: HCPCS | Performed by: PSYCHIATRY & NEUROLOGY

## 2022-07-25 PROCEDURE — G8432 DEP SCR NOT DOC, RNG: HCPCS | Performed by: PSYCHIATRY & NEUROLOGY

## 2022-07-25 PROCEDURE — 3017F COLORECTAL CA SCREEN DOC REV: CPT | Performed by: PSYCHIATRY & NEUROLOGY

## 2022-07-25 PROCEDURE — G8754 DIAS BP LESS 90: HCPCS | Performed by: PSYCHIATRY & NEUROLOGY

## 2022-07-25 PROCEDURE — G8536 NO DOC ELDER MAL SCRN: HCPCS | Performed by: PSYCHIATRY & NEUROLOGY

## 2022-07-25 PROCEDURE — 99214 OFFICE O/P EST MOD 30 MIN: CPT | Performed by: PSYCHIATRY & NEUROLOGY

## 2022-07-25 PROCEDURE — 1101F PT FALLS ASSESS-DOCD LE1/YR: CPT | Performed by: PSYCHIATRY & NEUROLOGY

## 2022-07-25 PROCEDURE — 1123F ACP DISCUSS/DSCN MKR DOCD: CPT | Performed by: PSYCHIATRY & NEUROLOGY

## 2022-07-25 PROCEDURE — G8427 DOCREV CUR MEDS BY ELIG CLIN: HCPCS | Performed by: PSYCHIATRY & NEUROLOGY

## 2022-07-25 PROCEDURE — G8752 SYS BP LESS 140: HCPCS | Performed by: PSYCHIATRY & NEUROLOGY

## 2022-07-25 NOTE — PROGRESS NOTES
Neurology Clinic Follow up Note    Patient ID:  Blane Orellana  [id number]  71 y.o.  1950      Mr. Keira Wright is here for follow up today of  Chief Complaint   Patient presents with    Follow-up     Discuss test results. Last Appointment With Me:  4/20/2022       Interval History:     Since last visit, patient underwent EEG which demonstrated background slowing. He was able to get MRI due to not being sure of the nature of his clip for cerebral aneurysm. Wife is growing frustrated with patient's mentation stating sometimes he does well other times he seems more confused which she admits to. She notes that if she wakes up in the morning this will be prominent (confusion). Otherwise is sometimes noted at night. No hallucinations, behavioral changes noted. He is also noted to not want to use his prosthetic leg which is limited his ability to socialize. PMHx/ PSHx/ FHx/ SHx:  Reviewed and unchanged previous visit. ROS:  Comprehensive review of systems negative except for as noted above. Objective:       Meds:  Current Outpatient Medications   Medication Sig Dispense Refill    donepeziL (ARICEPT) 10 mg tablet Take 1 Tablet by mouth nightly. 30 Tablet 11    memantine (Namenda) 5 mg tablet Take 1 Tablet by mouth in the morning. For 2 weeks then bid thereafter 60 Tablet 2    tamsulosin (FLOMAX) 0.4 mg capsule Take 1 Capsule by mouth nightly. 90 Capsule 3    risperiDONE (RisperDAL) 0.5 mg tablet Take 1 Tablet by mouth two (2) times a day. Take 1 tablet at 4:00 PM and 1 tablet at bedtime 60 Tablet 11    atorvastatin (LIPITOR) 10 mg tablet Take 1 Tablet by mouth daily. 30 Tablet 11       Exam:  Visit Vitals  /86   Pulse 89   Resp 18   SpO2 96%     Pleasant member scannable in wheelchair no distress. HEENT is grossly unrevealing neck appears supple. Cardiopulmonary exams are unremarkable. Abdomen is nondistended. Left leg is a below the knee amputation, right is above-knee.   Neurologically patient appears alert and oriented not directly assessed. Attention appears intact. Speech is clear, language is fluent. Mood appears euthymic. Cranial nerves II through XII are grossly unremarkable to observation. Motorically patient has full strength in upper extremities at least 4 to 4+ out of 5 strength in bilateral proximal lower extremities. Sensation is grossly intact in arms. Coordination is intact. Remainder examination is deferred    LABS  Results for orders placed or performed in visit on 03/01/22   VITAMIN B12 & FOLATE   Result Value Ref Range    Vitamin B12 763 193 - 986 pg/mL    Folate 13.1 5.0 - 37.5 ng/mL   METABOLIC PANEL, COMPREHENSIVE   Result Value Ref Range    Sodium 140 136 - 145 mmol/L    Potassium 4.5 3.5 - 5.1 mmol/L    Chloride 106 97 - 108 mmol/L    CO2 28 21 - 32 mmol/L    Anion gap 6 5 - 15 mmol/L    Glucose 87 65 - 100 mg/dL    BUN 11 6 - 20 MG/DL    Creatinine 1.09 0.70 - 1.30 MG/DL    BUN/Creatinine ratio 10 (L) 12 - 20      GFR est AA >60 >60 ml/min/1.73m2    GFR est non-AA >60 >60 ml/min/1.73m2    Calcium 10.0 8.5 - 10.1 MG/DL    Bilirubin, total 0.4 0.2 - 1.0 MG/DL    ALT (SGPT) 48 12 - 78 U/L    AST (SGOT) 53 (H) 15 - 37 U/L    Alk.  phosphatase 99 45 - 117 U/L    Protein, total 7.7 6.4 - 8.2 g/dL    Albumin 3.0 (L) 3.5 - 5.0 g/dL    Globulin 4.7 (H) 2.0 - 4.0 g/dL    A-G Ratio 0.6 (L) 1.1 - 2.2     CBC WITH AUTOMATED DIFF   Result Value Ref Range    WBC 11.8 (H) 4.1 - 11.1 K/uL    RBC 3.98 (L) 4.10 - 5.70 M/uL    HGB 10.0 (L) 12.1 - 17.0 g/dL    HCT 29.6 (L) 36.6 - 50.3 %    MCV 74.4 (L) 80.0 - 99.0 FL    MCH 25.1 (L) 26.0 - 34.0 PG    MCHC 33.8 30.0 - 36.5 g/dL    RDW 15.4 (H) 11.5 - 14.5 %    PLATELET 463 715 - 379 K/uL    MPV 11.9 8.9 - 12.9 FL    NRBC 0.0 0  WBC    ABSOLUTE NRBC 0.00 0.00 - 0.01 K/uL    NEUTROPHILS 59 32 - 75 %    BAND NEUTROPHILS 1 0 - 6 %    LYMPHOCYTES 29 12 - 49 %    MONOCYTES 6 5 - 13 %    EOSINOPHILS 2 0 - 7 %    BASOPHILS 1 0 - 1 % MYELOCYTES 2 (H) 0 %    IMMATURE GRANULOCYTES 0 %    ABS. NEUTROPHILS 7.1 1.8 - 8.0 K/UL    ABS. LYMPHOCYTES 3.4 0.8 - 3.5 K/UL    ABS. MONOCYTES 0.7 0.0 - 1.0 K/UL    ABS. EOSINOPHILS 0.2 0.0 - 0.4 K/UL    ABS. BASOPHILS 0.1 0.0 - 0.1 K/UL    ABS. IMM.  GRANS. 0.0 K/UL    DF MANUAL      RBC COMMENTS ANISOCYTOSIS  1+       GAMMOPATHY EVAL, SPEP/MANI, IG QT/FLC   Result Value Ref Range    Immunoglobulin G, Qt. 1,948 (H) 603 - 1,613 mg/dL    Immunoglobulin A, Qt. 377 61 - 437 mg/dL    Immunoglobulin M, Qt. 73 20 - 172 mg/dL    Protein, total 7.4 6.0 - 8.5 g/dL    Albumin 3.4 2.9 - 4.4 g/dL    Alpha-1-Globulin 0.4 0.0 - 0.4 g/dL    Alpha-2-Globulin 0.8 0.4 - 1.0 g/dL    Beta Globulin 1.2 0.7 - 1.3 g/dL    Gamma Globulin 1.6 0.4 - 1.8 g/dL    M-Virgilio Not Observed Not Observed g/dL    Globulin, total 4.0 (H) 2.2 - 3.9 g/dL    A/G ratio 0.9 0.7 - 1.7      Immunofixation Result Comment (A)      Free Kappa Lt Chains, serum 64.3 (H) 3.3 - 19.4 mg/L    Free Lambda Lt Chains, serum 46.2 (H) 5.7 - 26.3 mg/L    Kappa/Lambda ratio, serum 1.39 0.26 - 1.65     AMMONIA   Result Value Ref Range    Ammonia, plasma 36 (H) <32 UMOL/L       Assessment:     Conner Randolph is a pleasant 71year old RH dominant male who presents to [facility] to discuss results from prior visit    Plan:   Dementia:  Difficult to fully assess spec Alzheimer's type dementia versus vascular  Patient was recently increased on his donezepil to 10 mg by primary care which is reasonable as discussed at last visit  Does not appear to have his prominent rocking motions as he did last time  Regarding behavior encourage increased socialization his main measure along with ongoing continued diet and sleep as important measures to address  Discussed again that there really are not any medications to stop progression of dementia offered to have patient's wife put in touch with Alzheimer associated  which patient declines  Will follow-up in 6 months    Greater than 30 minutes were spent personally by me during this visit, of which greater than 50% of the time was spent in counseling and coordination of care      Signed:  Jose Miguel Nice MD  7/25/2022  9:43 AM

## 2022-09-23 NOTE — TELEPHONE ENCOUNTER
CM follow-up call Nurse Navigator was able to reach patient for follow-up no additional call is needed from CM.     Amarilys Hernandez CM 5

## 2022-10-12 RX ORDER — ATORVASTATIN CALCIUM 10 MG/1
10 TABLET, FILM COATED ORAL DAILY
Qty: 30 TABLET | Refills: 11 | Status: SHIPPED | OUTPATIENT
Start: 2022-10-12

## 2022-11-28 ENCOUNTER — OFFICE VISIT (OUTPATIENT)
Dept: INTERNAL MEDICINE CLINIC | Age: 72
End: 2022-11-28
Payer: MEDICARE

## 2022-11-28 VITALS
HEIGHT: 67 IN | OXYGEN SATURATION: 98 % | SYSTOLIC BLOOD PRESSURE: 134 MMHG | RESPIRATION RATE: 18 BRPM | HEART RATE: 84 BPM | DIASTOLIC BLOOD PRESSURE: 75 MMHG | TEMPERATURE: 97.7 F | BODY MASS INDEX: 17.09 KG/M2

## 2022-11-28 DIAGNOSIS — I10 PRIMARY HYPERTENSION: ICD-10-CM

## 2022-11-28 DIAGNOSIS — S88.911D AMPUTATION OF BOTH LOWER EXTREMITIES, SUBSEQUENT ENCOUNTER (HCC): ICD-10-CM

## 2022-11-28 DIAGNOSIS — S88.912D AMPUTATION OF BOTH LOWER EXTREMITIES, SUBSEQUENT ENCOUNTER (HCC): ICD-10-CM

## 2022-11-28 DIAGNOSIS — Z23 ENCOUNTER FOR IMMUNIZATION: ICD-10-CM

## 2022-11-28 DIAGNOSIS — R73.02 IGT (IMPAIRED GLUCOSE TOLERANCE): ICD-10-CM

## 2022-11-28 DIAGNOSIS — R44.3 HALLUCINATION: ICD-10-CM

## 2022-11-28 DIAGNOSIS — F02.818 DEMENTIA DUE TO ANOTHER GENERAL MEDICAL CONDITION, WITH BEHAVIORAL DISTURBANCE: Primary | ICD-10-CM

## 2022-11-28 DIAGNOSIS — N18.32 STAGE 3B CHRONIC KIDNEY DISEASE (HCC): ICD-10-CM

## 2022-11-28 DIAGNOSIS — E78.5 DYSLIPIDEMIA: ICD-10-CM

## 2022-11-28 PROCEDURE — 3078F DIAST BP <80 MM HG: CPT | Performed by: INTERNAL MEDICINE

## 2022-11-28 PROCEDURE — G8427 DOCREV CUR MEDS BY ELIG CLIN: HCPCS | Performed by: INTERNAL MEDICINE

## 2022-11-28 PROCEDURE — G0008 ADMIN INFLUENZA VIRUS VAC: HCPCS | Performed by: INTERNAL MEDICINE

## 2022-11-28 PROCEDURE — 3074F SYST BP LT 130 MM HG: CPT | Performed by: INTERNAL MEDICINE

## 2022-11-28 PROCEDURE — 1123F ACP DISCUSS/DSCN MKR DOCD: CPT | Performed by: INTERNAL MEDICINE

## 2022-11-28 PROCEDURE — 1101F PT FALLS ASSESS-DOCD LE1/YR: CPT | Performed by: INTERNAL MEDICINE

## 2022-11-28 PROCEDURE — 90694 VACC AIIV4 NO PRSRV 0.5ML IM: CPT | Performed by: INTERNAL MEDICINE

## 2022-11-28 PROCEDURE — 99214 OFFICE O/P EST MOD 30 MIN: CPT | Performed by: INTERNAL MEDICINE

## 2022-11-28 PROCEDURE — G8432 DEP SCR NOT DOC, RNG: HCPCS | Performed by: INTERNAL MEDICINE

## 2022-11-28 PROCEDURE — G8419 CALC BMI OUT NRM PARAM NOF/U: HCPCS | Performed by: INTERNAL MEDICINE

## 2022-11-28 PROCEDURE — 3017F COLORECTAL CA SCREEN DOC REV: CPT | Performed by: INTERNAL MEDICINE

## 2022-11-28 PROCEDURE — G8536 NO DOC ELDER MAL SCRN: HCPCS | Performed by: INTERNAL MEDICINE

## 2022-11-28 PROCEDURE — G8754 DIAS BP LESS 90: HCPCS | Performed by: INTERNAL MEDICINE

## 2022-11-28 PROCEDURE — G8752 SYS BP LESS 140: HCPCS | Performed by: INTERNAL MEDICINE

## 2022-11-28 NOTE — PROGRESS NOTES
SPORTS MEDICINE AND PRIMARY CARE  Vivi Morejon MD, [address] [address]  Phone:  [phone]  Fax: [phone]       Chief Complaint   Patient presents with    Hypertension   . SUBJECTIVE:    Corry Mchugh is a 71 y.o. male Since we last saw him, he was seen by Sima Ramon with [facility] for chronic kidney disease stage 3B. 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 Other medical problems include left brain damage. He has a follow up appointment to see the nephrologist in six months. Concerned the last time we talked to her about them being homeless. They found a house down the street from [facility] and she is paying rent there. It is a major relief. Apparently the family is friends with the daughter. Patient is seen for evaluation. Current Outpatient Medications   Medication Sig Dispense Refill    memantine (Namenda) 5 mg tablet Take 1 Tablet by mouth daily. For 2 weeks then bid thereafter 60 Tablet 11    atorvastatin (LIPITOR) 10 mg tablet TAKE 1 TABLET BY MOUTH DAILY 30 Tablet 11    donepeziL (ARICEPT) 10 mg tablet Take 1 Tablet by mouth nightly. 30 Tablet 11    tamsulosin (FLOMAX) 0.4 mg capsule Take 1 Capsule by mouth nightly. 90 Capsule 3    risperiDONE (RisperDAL) 0.5 mg tablet Take 1 Tablet by mouth two (2) times a day.  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 04/19/2019    Natacha Riley MD Dyslipidemia     Gangrene ([facility]     bilateral amputation d/t gangrene    Hallucination     Hernia, inguinal, left 2019    Hypertension     Inferior vena cava embolism ([facility] 2011    Pancreatic mass     Prediabetes     Renal failure     Dr Ayden Roman    S/P colonoscopy 08    ryan repeat 5 yrs    UTI (urinary tract infection)     Wound, open      Past Surgical History:   Procedure Laterality Date    COLONOSCOPY N/A 2018    COLONOSCOPY performed by Maurice Loomis MD at [facility]      bilateral amputation    HX UROLOGICAL      left orchiectomy    NEUROLOGICAL PROCEDURE UNLISTED  2004    aneursym    GA ABDOMEN SURGERY PROC UNLISTED      colonoscopy    GA CARDIAC SURG PROCEDURE UNLIST       No Known Allergies      REVIEW OF SYSTEMS:  General: negative for - chills or fever  ENT: negative for - headaches, nasal congestion or tinnitus  Respiratory: negative for - cough, hemoptysis, shortness of breath or wheezing  Cardiovascular : negative for - chest pain, edema, palpitations or shortness of breath  Gastrointestinal: negative for - abdominal pain, blood in stools, heartburn or nausea/vomiting  Genito-Urinary: no dysuria, trouble voiding, or hematuria  Musculoskeletal: negative for - gait disturbance, joint pain, joint stiffness or joint swelling  Neurological: no TIA or stroke symptoms  Hematologic: no bruises, no bleeding, no swollen glands  Integument: no lumps, mole changes, nail changes or rash  Endocrine: no malaise/lethargy or unexpected weight changes      Social History     Socioeconomic History    Marital status:    Tobacco Use    Smoking status: Former     Types: Cigarettes     Quit date: 2000     Years since quittin.3    Smokeless tobacco: Never   Vaping Use    Vaping Use: Never used   Substance and Sexual Activity    Alcohol use: Yes     Comment: ocassional, 3 wine coolers / year    Drug use: No    Sexual activity: Not Currently Partners: Female   Social History Narrative    Mother  80 uti dementia 3/22    His oldest daughter's mother  4 days apart. Habits: Patient is patient patient discontinued smoking and alcohol abuse        Social history patient is  lives with his wife ( has 2 children) for the past  21 years     Patient and father 3 children and is has been on disability since his amputation. 2 of \"brothers and 2 sisters. Family History   Problem Relation Age of Onset    No Known Problems Father        OBJECTIVE:    Visit Vitals  /75 (BP 1 Location: Left upper arm, BP Patient Position: Sitting)   Pulse 84   Temp 97.7 °F (36.5 °C) (Oral)   Resp 18   Ht 5' 7\" (1.702 m)   SpO2 98%   BMI 17.09 kg/m²     CONSTITUTIONAL: well , well nourished, appears age appropriate  EYES: perrla, eom intact  ENMT:moist mucous membranes, pharynx clear  NECK: supple. Thyroid normal  RESPIRATORY: Chest: clear bilaterally   CARDIOVASCULAR: Heart: regular rate and rhythm  GASTROINTESTINAL: Abdomen: soft, bowel sounds active  HEMATOLOGIC: no pathological lymph nodes palpated  MUSCULOSKELETAL: Extremities: no edema, pulse 1+   INTEGUMENT: No unusual rashes or suspicious skin lesions noted. Nails appear normal.  NEUROLOGIC: non-focal exam   MENTAL STATUS: alert and oriented, appropriate affect           ASSESSMENT:  1. 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 Encounter for immunization    3. Stage 3b chronic kidney disease ([address]    4. Amputation of both lower extremities, subsequent encounter ([facility]    5. IGT (impaired glucose tolerance)    6. Dyslipidemia    7. Primary hypertension    8. Hallucination      Dementia is progressive in spite of taking both Namenda and Aricept. He is sitting more and more in the bed and not getting up in a chair. CKD stage 3B is followed by nephrology. He is status post amputation of both lower extremities and is stable, bed to chair confined. He has impaired glucose tolerance, which has been stable. Dyslipidemia is covered with the Rosuvastatin. Blood pressure control is at goal.    Delusional disorder, as well as hallucinations, which are not as prominent currently. He will be back to see me in four months, sooner if he has any problems. I have discussed the diagnosis with the patient and the intended plan as seen in the  Orders. The patient understands and agees with the plan. The patient has   received an after visit summary and questions were answered concerning  future plans  Patient labs and/or xrays were reviewed  Past records were reviewed. PLAN:  .  Orders Placed This Encounter    Influenza, FLUAD, (age 72 y+), IM, PF, 0.5 mL       Follow-up and Dispositions    Return in about 4 months (around 3/28/2023). ATTENTION:   This medical record was transcribed using an electronic medical records system. Although proofread, it may and can contain electronic and spelling errors. Other human spelling and other errors may be present. Corrections may be executed at a later time. Please feel free to contact us for any clarifications as needed.

## 2022-11-28 NOTE — PROGRESS NOTES
Chief Complaint   Patient presents with    Hypertension     1. Have you been to the ER, urgent care clinic since your last visit? Hospitalized since your last visit? No    2. Have you seen or consulted any other health care providers outside of the [address] [place] [address] since your last visit? Include any pap smears or colon screening.  Yes Where: \" neurologist\"

## 2022-12-31 DIAGNOSIS — G24.01 TARDIVE DYSKINESIA: ICD-10-CM

## 2022-12-31 DIAGNOSIS — R44.3 HALLUCINATION: Primary | ICD-10-CM

## 2022-12-31 DIAGNOSIS — F02.818 DEMENTIA DUE TO ANOTHER GENERAL MEDICAL CONDITION, WITH BEHAVIORAL DISTURBANCE: ICD-10-CM

## 2023-01-23 ENCOUNTER — OFFICE VISIT (OUTPATIENT)
Dept: NEUROLOGY | Age: 73
End: 2023-01-23
Payer: MEDICARE

## 2023-01-23 VITALS — OXYGEN SATURATION: 97 % | HEART RATE: 89 BPM | SYSTOLIC BLOOD PRESSURE: 142 MMHG | DIASTOLIC BLOOD PRESSURE: 80 MMHG

## 2023-01-23 DIAGNOSIS — F02.80 MIXED ALZHEIMER'S AND VASCULAR DEMENTIA (HCC): Primary | ICD-10-CM

## 2023-01-23 DIAGNOSIS — F01.50 MIXED ALZHEIMER'S AND VASCULAR DEMENTIA (HCC): Primary | ICD-10-CM

## 2023-01-23 DIAGNOSIS — G30.9 MIXED ALZHEIMER'S AND VASCULAR DEMENTIA (HCC): Primary | ICD-10-CM

## 2023-01-23 DIAGNOSIS — G24.9 DYSKINESIA: ICD-10-CM

## 2023-01-23 PROCEDURE — 99214 OFFICE O/P EST MOD 30 MIN: CPT | Performed by: PSYCHIATRY & NEUROLOGY

## 2023-01-23 PROCEDURE — 1101F PT FALLS ASSESS-DOCD LE1/YR: CPT | Performed by: PSYCHIATRY & NEUROLOGY

## 2023-01-23 PROCEDURE — 3017F COLORECTAL CA SCREEN DOC REV: CPT | Performed by: PSYCHIATRY & NEUROLOGY

## 2023-01-23 PROCEDURE — 3077F SYST BP >= 140 MM HG: CPT | Performed by: PSYCHIATRY & NEUROLOGY

## 2023-01-23 PROCEDURE — G8432 DEP SCR NOT DOC, RNG: HCPCS | Performed by: PSYCHIATRY & NEUROLOGY

## 2023-01-23 PROCEDURE — 3079F DIAST BP 80-89 MM HG: CPT | Performed by: PSYCHIATRY & NEUROLOGY

## 2023-01-23 PROCEDURE — G8536 NO DOC ELDER MAL SCRN: HCPCS | Performed by: PSYCHIATRY & NEUROLOGY

## 2023-01-23 PROCEDURE — 1123F ACP DISCUSS/DSCN MKR DOCD: CPT | Performed by: PSYCHIATRY & NEUROLOGY

## 2023-01-23 PROCEDURE — G8419 CALC BMI OUT NRM PARAM NOF/U: HCPCS | Performed by: PSYCHIATRY & NEUROLOGY

## 2023-01-23 PROCEDURE — G8427 DOCREV CUR MEDS BY ELIG CLIN: HCPCS | Performed by: PSYCHIATRY & NEUROLOGY

## 2023-01-23 RX ORDER — MEMANTINE HYDROCHLORIDE 10 MG/1
10 TABLET ORAL 2 TIMES DAILY
Qty: 180 TABLET | Refills: 1 | Status: SHIPPED | OUTPATIENT
Start: 2023-01-23

## 2023-01-23 NOTE — PROGRESS NOTES
Neurology Clinic Follow up Note    Patient ID:  Lona Noyola  [id number]  71 y.o.  1950      Mr. Fernando Ortega is here for follow up today of dementia. Last Appointment With Me:  None, prior patient Dr. Kary Marcos      Interval History:   Patient returns as a new patient to me, previously followed by Dr. Kary Marcos for dementia. He arrives significantly late to his appointment today. His wife reports memory has declined since last visit. He needs assistance with meal prep, medications, finances. He remains independent for dressing/bathing/eating. He appears to be tolerating Aricept/Namenda without side effects. Noting more recent extremity dyskinesias/tremors for several months. Wife reports prior exposure to neuroleptics for hallucinations. He denies any recent hallucinations/behavioral concerns. He is mainly wheelchair dependent due to b/l LE amputation. PMHx/ PSHx/ FHx/ SHx:  Reviewed and unchanged previous visit. 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 Objective:       Meds:  Current Outpatient Medications   Medication Sig Dispense Refill    memantine (Namenda) 5 mg tablet Take 1 Tablet by mouth daily.  For 2 weeks then bid thereafter 60 Tablet 11    atorvastatin (LIPITOR) 10 mg tablet TAKE 1 TABLET BY MOUTH DAILY 30 Tablet 11    donepeziL (ARICEPT) 10 mg tablet Take 1 Tablet by mouth nightly. 30 Tablet 11    tamsulosin (FLOMAX) 0.4 mg capsule Take 1 Capsule by mouth nightly. 90 Capsule 3    risperiDONE (RisperDAL) 0.5 mg tablet Take 1 Tablet by mouth two (2) times a day. Take 1 tablet at 4:00 PM and 1 tablet at bedtime 60 Tablet 11       Exam:  Visit Vitals  BP (!) 142/80   Pulse 89   SpO2 97%     NEUROLOGICAL EXAM:  General: Awake, alert, speech fluent. Disoriented to date, knows self, place. Delayed recall: 0/5  CN: PERRL, EOMI without nystagmus, VFF to confrontation, facial sensation and strength are normal and symmetric, hearing is intact to finger rub bilaterally, palate and tongue movements are intact and symmetric. Motor: UE 5/5 throughout, LE b/l amputation proximal 5/5. Reflexes: Deferred  Coordination: FNF intact b/l  Sensation: LT intact throughout. Gait: Cannot assess, wheelchair dependent      LABS  Results for orders placed or performed in visit on 03/01/22   VITAMIN B12 & FOLATE   Result Value Ref Range    Vitamin B12 763 193 - 986 pg/mL    Folate 13.1 5.0 - 52.8 ng/mL   METABOLIC PANEL, COMPREHENSIVE   Result Value Ref Range    Sodium 140 136 - 145 mmol/L    Potassium 4.5 3.5 - 5.1 mmol/L    Chloride 106 97 - 108 mmol/L    CO2 28 21 - 32 mmol/L    Anion gap 6 5 - 15 mmol/L    Glucose 87 65 - 100 mg/dL    BUN 11 6 - 20 MG/DL    Creatinine 1.09 0.70 - 1.30 MG/DL    BUN/Creatinine ratio 10 (L) 12 - 20      GFR est AA >60 >60 ml/min/1.73m2    GFR est non-AA >60 >60 ml/min/1.73m2    Calcium 10.0 8.5 - 10.1 MG/DL    Bilirubin, total 0.4 0.2 - 1.0 MG/DL    ALT (SGPT) 48 12 - 78 U/L    AST (SGOT) 53 (H) 15 - 37 U/L    Alk.  phosphatase 99 45 - 117 U/L    Protein, total 7.7 6.4 - 8.2 g/dL    Albumin 3.0 (L) 3.5 - 5.0 g/dL    Globulin 4.7 (H) 2.0 - 4.0 g/dL    A-G Ratio 0.6 (L) 1.1 - 2.2     CBC WITH AUTOMATED DIFF Result Value Ref Range    WBC 11.8 (H) 4.1 - 11.1 K/uL    RBC 3.98 (L) 4.10 - 5.70 M/uL    HGB 10.0 (L) 12.1 - 17.0 g/dL    HCT 29.6 (L) 36.6 - 50.3 %    MCV 74.4 (L) 80.0 - 99.0 FL    MCH 25.1 (L) 26.0 - 34.0 PG    MCHC 33.8 30.0 - 36.5 g/dL    RDW 15.4 (H) 11.5 - 14.5 %    PLATELET 615 137 - 526 K/uL    MPV 11.9 8.9 - 12.9 FL    NRBC 0.0 0  WBC    ABSOLUTE NRBC 0.00 0.00 - 0.01 K/uL    NEUTROPHILS 59 32 - 75 %    BAND NEUTROPHILS 1 0 - 6 %    LYMPHOCYTES 29 12 - 49 %    MONOCYTES 6 5 - 13 %    EOSINOPHILS 2 0 - 7 %    BASOPHILS 1 0 - 1 %    MYELOCYTES 2 (H) 0 %    IMMATURE GRANULOCYTES 0 %    ABS. NEUTROPHILS 7.1 1.8 - 8.0 K/UL    ABS. LYMPHOCYTES 3.4 0.8 - 3.5 K/UL    ABS. MONOCYTES 0.7 0.0 - 1.0 K/UL    ABS. EOSINOPHILS 0.2 0.0 - 0.4 K/UL    ABS. BASOPHILS 0.1 0.0 - 0.1 K/UL    ABS. IMM. GRANS. 0.0 K/UL    DF MANUAL      RBC COMMENTS ANISOCYTOSIS  1+       GAMMOPATHY EVAL, SPEP/MANI, IG QT/FLC   Result Value Ref Range    Immunoglobulin G, Qt. 1,948 (H) 603 - 1,613 mg/dL    Immunoglobulin A, Qt. 377 61 - 437 mg/dL    Immunoglobulin M, Qt. 73 20 - 172 mg/dL    Protein, total 7.4 6.0 - 8.5 g/dL    Albumin 3.4 2.9 - 4.4 g/dL    Alpha-1-Globulin 0.4 0.0 - 0.4 g/dL    Alpha-2-Globulin 0.8 0.4 - 1.0 g/dL    Beta Globulin 1.2 0.7 - 1.3 g/dL    Gamma Globulin 1.6 0.4 - 1.8 g/dL    M-Virgilio Not Observed Not Observed g/dL    Globulin, total 4.0 (H) 2.2 - 3.9 g/dL    A/G ratio 0.9 0.7 - 1.7      Immunofixation Result Comment (A)      Free Kappa Lt Chains, serum 64.3 (H) 3.3 - 19.4 mg/L    Free Lambda Lt Chains, serum 46.2 (H) 5.7 - 26.3 mg/L    Kappa/Lambda ratio, serum 1.39 0.26 - 1.65     AMMONIA   Result Value Ref Range    Ammonia, plasma 36 (H) <32 UMOL/L       IMAGING  CT Results (maximum last 3):  CT HEAD WO CONT    Narrative  EXAM: CT HEAD WO CONT    INDICATION: Increased confusion and lethargy, likely sepsis, assess for acute  intracranial pathology    COMPARISON: None. CONTRAST: None.     TECHNIQUE: Unenhanced CT of the head was performed using 5 mm images. Brain and  bone windows were generated. CT dose reduction was achieved through use of a  standardized protocol tailored for this examination and automatic exposure  control for dose modulation. FINDINGS:  Postsurgical change is associated with the right side of the skull. The two  aneurysm clips in the vicinity of the right side of the sella turcica are again  noted. There is no evidence of intracranial hemorrhage. The previously described  areas of encephalomalacic change in the right frontal and left occipital regions  are again noted and are unchanged in appearance. As seen on axial image 6, small  osteomata are noted in the anterior aspect of both sides of the ethmoid sinus    Impression  1. No evidence of intracranial hemorrhage. 2. Stable appearance of the previously described areas of encephalomalacic  change in the right frontal and occipital regions. 3. Evidence of postsurgical change involving the right side of the skull. Presence of the two previously described aneurysm clips. Assessment:     Encounter Diagnoses     ICD-10-CM ICD-9-CM   1. Mixed Alzheimer's and vascular dementia ([facility]  G30.9 331.0    F01.50 294.10    F02.80 290.40   2. Dyskinesia  G24.9 66.3     71year old male returning for follow up of dementia, previously followed by Dr. Frida Guerrero. Family reports progressive cognitive decline since last visit. Head CT was reviewed from 2/23/22 noting stable R frontal/occipital encephalomalacia, postsurgical changes involving R skull with prior aneurysm clips. MRI not obtained due to prior intracranial hardware. EEG noted diffuse slowing without ictal focus. Suspect a mixed Alzheimer's and vascular dementia. Discussed treatment options in detail including risks/benefits and expectations. While medication is not likely to made a \"night and day\" difference, it may aid modestly in improving concentration and attention.   Medication titration and addition of adjunctive agents may be necessary to achieve maximum benefit. Explained that AD is a progressive disease process. Acetylcholinesterase inhibitors and NMDA receptor antagonist may be used to assist with attention and recall. Discussed that he would benefit from designation of a POA to assist with executive decision making. Finances and medication administration should be monitored to ensure accuracy and prevent errors. Lastly, extremity dyskinesia are likely associated with neuroleptic use. Would discuss possible weaning off of medication versus introduction of medication to address dyskinesias with prescribing provider. Plan:   Continue Aricept 10mg qhs  Increase Namenda to 10mg BID    Follow-up and Dispositions    Return in about 6 months (around 7/23/2023). I have discussed the diagnosis with the patient today and the intended plan as seen in the above orders with both the patient as well as referring provider and/or PCP via electronic correspondence. The patient has received an after-visit summary and questions were answered concerning future plans. I have discussed medication side effects and warnings with the patient as well.       Signed:  Luis Leonard DO  1/23/2023  10:49 AM

## 2023-02-12 NOTE — PROGRESS NOTES
Patient very agitated, stated he was leaving, trying to get over the siderail and saying this is not his bed. Haldol given. No indicators present

## 2023-03-28 ENCOUNTER — OFFICE VISIT (OUTPATIENT)
Dept: INTERNAL MEDICINE CLINIC | Age: 73
End: 2023-03-28
Payer: MEDICARE

## 2023-03-28 VITALS
BODY MASS INDEX: 17.09 KG/M2 | OXYGEN SATURATION: 98 % | HEIGHT: 67 IN | TEMPERATURE: 97.6 F | SYSTOLIC BLOOD PRESSURE: 109 MMHG | RESPIRATION RATE: 18 BRPM | HEART RATE: 91 BPM | DIASTOLIC BLOOD PRESSURE: 70 MMHG

## 2023-03-28 DIAGNOSIS — R35.1 NOCTURIA: ICD-10-CM

## 2023-03-28 DIAGNOSIS — N18.32 STAGE 3B CHRONIC KIDNEY DISEASE (HCC): ICD-10-CM

## 2023-03-28 DIAGNOSIS — D69.6 THROMBOCYTOPENIA, UNSPECIFIED (HCC): ICD-10-CM

## 2023-03-28 DIAGNOSIS — G24.01 TARDIVE DYSKINESIA: ICD-10-CM

## 2023-03-28 DIAGNOSIS — F02.818 DEMENTIA DUE TO ANOTHER GENERAL MEDICAL CONDITION, WITH BEHAVIORAL DISTURBANCE (HCC): ICD-10-CM

## 2023-03-28 DIAGNOSIS — S88.911D AMPUTATION OF BOTH LOWER EXTREMITIES, SUBSEQUENT ENCOUNTER (HCC): ICD-10-CM

## 2023-03-28 DIAGNOSIS — I10 PRIMARY HYPERTENSION: Primary | ICD-10-CM

## 2023-03-28 DIAGNOSIS — R73.02 IGT (IMPAIRED GLUCOSE TOLERANCE): ICD-10-CM

## 2023-03-28 DIAGNOSIS — N30.00 ACUTE CYSTITIS WITHOUT HEMATURIA: ICD-10-CM

## 2023-03-28 DIAGNOSIS — E78.5 DYSLIPIDEMIA: ICD-10-CM

## 2023-03-28 DIAGNOSIS — S88.912D AMPUTATION OF BOTH LOWER EXTREMITIES, SUBSEQUENT ENCOUNTER (HCC): ICD-10-CM

## 2023-03-28 PROBLEM — A41.9 SEPSIS (HCC): Status: RESOLVED | Noted: 2022-01-28 | Resolved: 2023-03-28

## 2023-03-28 PROCEDURE — G8427 DOCREV CUR MEDS BY ELIG CLIN: HCPCS | Performed by: INTERNAL MEDICINE

## 2023-03-28 PROCEDURE — G8536 NO DOC ELDER MAL SCRN: HCPCS | Performed by: INTERNAL MEDICINE

## 2023-03-28 PROCEDURE — 99214 OFFICE O/P EST MOD 30 MIN: CPT | Performed by: INTERNAL MEDICINE

## 2023-03-28 PROCEDURE — 3074F SYST BP LT 130 MM HG: CPT | Performed by: INTERNAL MEDICINE

## 2023-03-28 PROCEDURE — 1101F PT FALLS ASSESS-DOCD LE1/YR: CPT | Performed by: INTERNAL MEDICINE

## 2023-03-28 PROCEDURE — 3017F COLORECTAL CA SCREEN DOC REV: CPT | Performed by: INTERNAL MEDICINE

## 2023-03-28 PROCEDURE — 3078F DIAST BP <80 MM HG: CPT | Performed by: INTERNAL MEDICINE

## 2023-03-28 PROCEDURE — 1123F ACP DISCUSS/DSCN MKR DOCD: CPT | Performed by: INTERNAL MEDICINE

## 2023-03-28 PROCEDURE — G8419 CALC BMI OUT NRM PARAM NOF/U: HCPCS | Performed by: INTERNAL MEDICINE

## 2023-03-28 PROCEDURE — G8510 SCR DEP NEG, NO PLAN REQD: HCPCS | Performed by: INTERNAL MEDICINE

## 2023-03-28 RX ORDER — TAMSULOSIN HYDROCHLORIDE 0.4 MG/1
0.4 CAPSULE ORAL
Qty: 90 CAPSULE | Refills: 3 | Status: SHIPPED | OUTPATIENT
Start: 2023-03-28

## 2023-03-28 NOTE — PROGRESS NOTES
Daria Hwang is a 71 y.o. male    Chief Complaint   Patient presents with    Hypertension     1. Have you been to the ER, urgent care clinic since your last visit? Hospitalized since your last visit? No    2. Have you seen or consulted any other health care providers outside of the [address] [place] [address] since your last visit? Include any pap smears or colon screening.  No

## 2023-03-28 NOTE — PROGRESS NOTES
SP[facility]  Lachelle Renteria MD, [address] [address]  Phone:  [phone]  Fax: [phone]       Chief Complaint   Patient presents with    Hypertension   . SUBJECTIVE:    Brittanie Mckeon is a 71 y.o. male Patient returns today with his wife with a known history of primary hypertension, amputation of both lower extremities, dementia, delusional disorder, thrombocytopenia, CKD stage 3A and is seen for evaluation. Risperdal has been discontinued. His mental status is improved, but shaking has not changed. History of tardive dyskinesia, probably related to that drug. Other new concerns are denied and patient is seen for evaluation. Current Outpatient Medications   Medication Sig Dispense Refill    tamsulosin (FLOMAX) 0.4 mg capsule Take 1 Capsule by mouth nightly. 90 Capsule 3    memantine (NAMENDA) 10 mg tablet Take 1 Tablet by mouth two (2) times a day. 180 Tablet 1    atorvastatin (LIPITOR) 10 mg tablet TAKE 1 TABLET BY MOUTH DAILY 30 Tablet 11    donepeziL (ARICEPT) 10 mg tablet Take 1 Tablet by mouth nightly. 30 Tablet 11    risperiDONE (RisperDAL) 0.5 mg tablet Take 1 Tablet by mouth two (2) times a day.  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 04/19/2019    Estefani Hazel MD    Dyslipidemia     Gangrene ([address] 2011    bilateral amputation d/t gangrene    Hallucination     Hernia, inguinal, left 04/01/2019    Hypertension     Inferior vena cava embolism ([address] 05/23/2011    Pancreatic mass     Prediabetes     Renal failure     Dr Jose Gordillo    S/P colonoscopy 1-4-08  2/26/19    ryan repeat 5 yrs    UTI (urinary tract infection)     Wound, open      Past Surgical History:   Procedure Laterality Date    COLONOSCOPY N/A 2018    COLONOSCOPY performed by Bell Salazar MD at [facility]      bilateral amputation    HX UROLOGICAL      left orchiectomy    SC UNLISTED NEUROLOGICAL/NEUROMUSCULAR DX PX  2004    aneursym    SC UNLISTED PROCEDURE ABDOMEN PERITONEUM & OMENTUM      colonoscopy    SC UNLISTED PROCEDURE CARDIAC SURGERY       No Known Allergies      REVIEW OF SYSTEMS:  General: negative for - chills or fever  ENT: negative for - headaches, nasal congestion or tinnitus  Respiratory: negative for - cough, hemoptysis, shortness of breath or wheezing  Cardiovascular : negative for - chest pain, edema, palpitations or shortness of breath  Gastrointestinal: negative for - abdominal pain, blood in stools, heartburn or nausea/vomiting  Genito-Urinary: no dysuria, trouble voiding, or hematuria  Musculoskeletal: negative for - gait disturbance, joint pain, joint stiffness or joint swelling  Neurological: no TIA or stroke symptoms  Hematologic: no bruises, no bleeding, no swollen glands  Integument: no lumps, mole changes, nail changes or rash  Endocrine: no malaise/lethargy or unexpected weight changes      Social History     Socioeconomic History    Marital status:    Tobacco Use    Smoking status: Former     Types: Cigarettes     Quit date: 2000     Years since quittin.6    Smokeless tobacco: Never   Vaping Use    Vaping Use: Never used   Substance and Sexual Activity    Alcohol use: Yes     Comment: ocassional, 3 wine coolers / year    Drug use: No    Sexual activity: Not Currently     Partners: Female   Social History Narrative    Mother  80 uti dementia 3/22    His oldest daughter's mother  4 days apart.         Habits: Patient is patient patient discontinued smoking and alcohol abuse        Social history patient is  lives with his wife ( has 2 children) for the past  20 years     Patient and father 3 children and is has been on disability since his amputation. 2 of \"brothers and 2 sisters. Family History   Problem Relation Age of Onset    No Known Problems Father        OBJECTIVE:    Visit Vitals  /70 (BP 1 Location: Left upper arm, BP Patient Position: Sitting)   Pulse 91   Temp 97.6 °F (36.4 °C) (Oral)   Resp 18   Ht 5' 7\" (1.702 m)   SpO2 98%   BMI 17.09 kg/m²     CONSTITUTIONAL: well , well nourished, appears age appropriate  EYES: perrla, eom intact  ENMT:moist mucous membranes, pharynx clear  NECK: supple. Thyroid normal  RESPIRATORY: Chest: clear bilaterally   CARDIOVASCULAR: Heart: regular rate and rhythm  GASTROINTESTINAL: Abdomen: soft, bowel sounds active  HEMATOLOGIC: no pathological lymph nodes palpated  MUSCULOSKELETAL: Extremities: no edema, pulse 1+   INTEGUMENT: No unusual rashes or suspicious skin lesions noted. Nails appear normal.  NEUROLOGIC: non-focal exam   MENTAL STATUS: alert and oriented, appropriate affect           ASSESSMENT:  1. Primary hypertension    2. Stage 3b chronic kidney disease ([address]    3. 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, with behavioral disturbance    4. Thrombocytopenia, unspecified    5. Acute cystitis without hematuria    6. Amputation of both lower extremities, subsequent encounter ([facility]    7. Tardive dyskinesia    8. IGT (impaired glucose tolerance)    9. Dyslipidemia    10. Nocturia      Blood pressure control is at goal, no titration is needed. He has stage 3B CKD, which we continue to monitor. Dementia is quiescent. It seems to be stable. Fortunately he no longer requires Risperdal for behavioral issues. Thrombocytopenia, for which we will check CBC today. There is a history of acute cystitis and we will repeat a UA and urine C&S today. He has amputation of both lower extremities, the right with an AKA and left with a BKA. He is wheelchair bound.     The tardive dyskinesia is related to Risperdal.  Hopefully it will stop, but it may not necessarily be the case. He has impaired glucose tolerance and we will check hemoglobin A1c. History of dyslipidemia, for which we will check a lipid panel today. He has nocturia and we will check PSA. He will be back to see us in four months, sooner if he has any problems. I have discussed the diagnosis with the patient and the intended plan as seen in the  Orders. The patient understands and agees with the plan. The patient has   received an after visit summary and questions were answered concerning  future plans  Patient labs and/or xrays were reviewed  Past records were reviewed. PLAN:  .  Orders Placed This Encounter    CBC WITH AUTOMATED DIFF    METABOLIC PANEL, COMPREHENSIVE    LIPID PANEL    PROSTATE SPECIFIC AG    URINALYSIS W/ REFLEX CULTURE    HEMOGLOBIN A1C WITH EAG    tamsulosin (FLOMAX) 0.4 mg capsule       Follow-up and Dispositions    Return in about 4 months (around 7/28/2023). ATTENTION:   This medical record was transcribed using an electronic medical records system. Although proofread, it may and can contain electronic and spelling errors. Other human spelling and other errors may be present. Corrections may be executed at a later time. Please feel free to contact us for any clarifications as needed.

## 2023-03-29 LAB
ALBUMIN SERPL-MCNC: 3.6 G/DL (ref 3.5–5)
ALBUMIN/GLOB SERPL: 0.8 (ref 1.1–2.2)
ALP SERPL-CCNC: 104 U/L (ref 45–117)
ALT SERPL-CCNC: 40 U/L (ref 12–78)
ANION GAP SERPL CALC-SCNC: 2 MMOL/L (ref 5–15)
APPEARANCE UR: ABNORMAL
AST SERPL-CCNC: 31 U/L (ref 15–37)
BACTERIA URNS QL MICRO: ABNORMAL /HPF
BASOPHILS # BLD: 0.1 K/UL (ref 0–0.1)
BASOPHILS NFR BLD: 1 % (ref 0–1)
BILIRUB SERPL-MCNC: 0.5 MG/DL (ref 0.2–1)
BILIRUB UR QL: NEGATIVE
BUN SERPL-MCNC: 16 MG/DL (ref 6–20)
BUN/CREAT SERPL: 12 (ref 12–20)
CALCIUM SERPL-MCNC: 10.2 MG/DL (ref 8.5–10.1)
CHLORIDE SERPL-SCNC: 107 MMOL/L (ref 97–108)
CHOLEST SERPL-MCNC: 152 MG/DL
CO2 SERPL-SCNC: 31 MMOL/L (ref 21–32)
COLOR UR: ABNORMAL
COMMENT, HOLDF: NORMAL
CREAT SERPL-MCNC: 1.38 MG/DL (ref 0.7–1.3)
DIFFERENTIAL METHOD BLD: ABNORMAL
EOSINOPHIL # BLD: 0.3 K/UL (ref 0–0.4)
EOSINOPHIL NFR BLD: 5 % (ref 0–7)
EPITH CASTS URNS QL MICRO: ABNORMAL /LPF
ERYTHROCYTE [DISTWIDTH] IN BLOOD BY AUTOMATED COUNT: 15.7 % (ref 11.5–14.5)
EST. AVERAGE GLUCOSE BLD GHB EST-MCNC: 114 MG/DL
GLOBULIN SER CALC-MCNC: 4.6 G/DL (ref 2–4)
GLUCOSE SERPL-MCNC: 115 MG/DL (ref 65–100)
GLUCOSE UR STRIP.AUTO-MCNC: NEGATIVE MG/DL
HBA1C MFR BLD: 5.6 % (ref 4–5.6)
HCT VFR BLD AUTO: 39 % (ref 36.6–50.3)
HDLC SERPL-MCNC: 79 MG/DL
HDLC SERPL: 1.9 (ref 0–5)
HGB BLD-MCNC: 12.6 G/DL (ref 12.1–17)
HGB UR QL STRIP: NEGATIVE
HYALINE CASTS URNS QL MICRO: ABNORMAL /LPF (ref 0–5)
IMM GRANULOCYTES # BLD AUTO: 0 K/UL (ref 0–0.04)
IMM GRANULOCYTES NFR BLD AUTO: 0 % (ref 0–0.5)
KETONES UR QL STRIP.AUTO: NEGATIVE MG/DL
LDLC SERPL CALC-MCNC: 62.4 MG/DL (ref 0–100)
LEUKOCYTE ESTERASE UR QL STRIP.AUTO: ABNORMAL
LYMPHOCYTES # BLD: 2.2 K/UL (ref 0.8–3.5)
LYMPHOCYTES NFR BLD: 39 % (ref 12–49)
MCH RBC QN AUTO: 24.2 PG (ref 26–34)
MCHC RBC AUTO-ENTMCNC: 32.3 G/DL (ref 30–36.5)
MCV RBC AUTO: 75 FL (ref 80–99)
MONOCYTES # BLD: 0.5 K/UL (ref 0–1)
MONOCYTES NFR BLD: 8 % (ref 5–13)
NEUTS SEG # BLD: 2.6 K/UL (ref 1.8–8)
NEUTS SEG NFR BLD: 47 % (ref 32–75)
NITRITE UR QL STRIP.AUTO: POSITIVE
NRBC # BLD: 0 K/UL (ref 0–0.01)
NRBC BLD-RTO: 0 PER 100 WBC
PH UR STRIP: 6 (ref 5–8)
PLATELET # BLD AUTO: 104 K/UL (ref 150–400)
POTASSIUM SERPL-SCNC: 4.5 MMOL/L (ref 3.5–5.1)
PROT SERPL-MCNC: 8.2 G/DL (ref 6.4–8.2)
PROT UR STRIP-MCNC: NEGATIVE MG/DL
PSA SERPL-MCNC: 1 NG/ML (ref 0.01–4)
RBC # BLD AUTO: 5.2 M/UL (ref 4.1–5.7)
RBC #/AREA URNS HPF: ABNORMAL /HPF (ref 0–5)
SAMPLES BEING HELD,HOLD: NORMAL
SODIUM SERPL-SCNC: 140 MMOL/L (ref 136–145)
SP GR UR REFRACTOMETRY: 1.01 (ref 1–1.03)
TRIGL SERPL-MCNC: 53 MG/DL (ref ?–150)
UA: UC IF INDICATED,UAUC: ABNORMAL
UROBILINOGEN UR QL STRIP.AUTO: 0.2 EU/DL (ref 0.2–1)
VLDLC SERPL CALC-MCNC: 10.6 MG/DL
WBC # BLD AUTO: 5.5 K/UL (ref 4.1–11.1)
WBC URNS QL MICRO: >100 /HPF (ref 0–4)

## 2023-03-29 RX ORDER — CIPROFLOXACIN 500 MG/1
500 TABLET ORAL 2 TIMES DAILY
Qty: 14 TABLET | Refills: 0 | Status: SHIPPED | OUTPATIENT
Start: 2023-03-29

## 2023-03-30 ENCOUNTER — TELEPHONE (OUTPATIENT)
Dept: INTERNAL MEDICINE CLINIC | Age: 73
End: 2023-03-30

## 2023-03-30 NOTE — PROGRESS NOTES
Please call the patient and advise You have a urinary tract infection and although I do not have the final result with sensitivities I sent a prescription for Cipro to pharmacist for treatment. There is a drug interaction with Aricept and therefore I want to stop the Aricept for 3 days before taking this medication.   Be sure that you are drinking 64 ounces of water daily

## 2023-03-30 NOTE — PROGRESS NOTES
You have a urinary tract infection and although I do not have the final result with sensitivities I sent a prescription for Cipro to pharmacist for treatment. There is a drug interaction with Aricept and therefore I want to stop the Aricept for 3 days before taking this medication.   Be sure that you are drinking 64 ounces of water daily

## 2023-03-30 NOTE — TELEPHONE ENCOUNTER
Patient wife notified and told of uti and patient needs to hold aricept x 3 days before starting the cipro and stay off until he completes the antibiotic.

## 2023-03-31 PROBLEM — B96.20 ESCHERICHIA COLI URINARY TRACT INFECTION: Status: ACTIVE | Noted: 2023-03-28

## 2023-03-31 PROBLEM — N39.0 URINARY TRACT INFECTION DUE TO PSEUDOMONAS AERUGINOSA: Status: ACTIVE | Noted: 2022-02-24

## 2023-03-31 PROBLEM — B96.5 URINARY TRACT INFECTION DUE TO PSEUDOMONAS AERUGINOSA: Status: ACTIVE | Noted: 2022-02-24

## 2023-03-31 PROBLEM — N39.0 ESCHERICHIA COLI URINARY TRACT INFECTION: Status: ACTIVE | Noted: 2023-03-28

## 2023-03-31 LAB
BACTERIA SPEC CULT: ABNORMAL
CC UR VC: ABNORMAL
SERVICE CMNT-IMP: ABNORMAL

## 2023-04-29 NOTE — PROGRESS NOTES
This is the Subsequent Medicare Annual Wellness Exam, performed 12 months or more after the Initial AWV or the last Subsequent AWV    I have reviewed the patient's medical history in detail and updated the computerized patient record. Assessment/Plan   Education and counseling provided:  Are appropriate based on today's review and evaluation    1. Screening for alcoholism  2. Medicare annual wellness visit, subsequent     Depression Risk Factor Screening     3 most recent PHQ Screens 7/22/2022   PHQ Not Done -   Little interest or pleasure in doing things Not at all   Feeling down, depressed, irritable, or hopeless Not at all   Total Score PHQ 2 0   Trouble falling or staying asleep, or sleeping too much -   Feeling tired or having little energy -   Poor appetite, weight loss, or overeating -   Feeling bad about yourself - or that you are a failure or have let yourself or your family down -   Trouble concentrating on things such as school, work, reading, or watching TV -   Moving or speaking so slowly that other people could have noticed; or the opposite being so fidgety that others notice -   Thoughts of being better off dead, or hurting yourself in some way -   PHQ 9 Score -   How difficult have these problems made it for you to do your work, take care of your home and get along with others -       Alcohol & Drug Abuse Risk Screen    Do you average more than 1 drink per night or more than 7 drinks a week: No    In the past three months have you have had more than 4 drinks containing alcohol on one occasion: No          Functional Ability and Level of Safety    Hearing: Hearing is good. Activities of Daily Living: The home contains: no safety equipment. Patient does total self care      Ambulation: with no difficulty     Fall Risk:  Fall Risk Assessment, last 12 mths 3/22/2022   Able to walk?  No   Fall in past 12 months? -   Do you feel unsteady? -   Are you worried about falling -      Abuse Screen:  Patient is not abused       Cognitive Screening    Has your family/caregiver stated any concerns about your memory: no     Cognitive Screening: Normal - Verbal Fluency Test    Health Maintenance Due     Health Maintenance Due   Topic Date Due    Pneumococcal 65+ years (1 - PCV) Never done    COVID-19 Vaccine (4 - Booster for [employer] series) 03/09/2022       Patient Care Team   Patient Care Team:  Diane Aldana MD as PCP - General (Internal Medicine Physician)  Diane Aldana MD as PCP - [facility] Empaneled Provider    History     Patient Active Problem List   Diagnosis Code    Pancreatic mass K86.89    CKD (chronic kidney disease) N18.9    Nephrotic syndrome N04.9    Anasarca R60.1    Hypoalbuminemia E88.09    Hypertension I10    Amputation of both lower extremities ([address] Z92.828E, O55.038M    IGT (impaired glucose tolerance) R73.02    Hallucination R44.3    Hernia, inguinal, left K40.90    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, with behavioral disturbance ([address] F02.81    S/P colonoscopy Z98.890    Dyslipidemia E78.5    Tardive dyskinesia G24.01    Sepsis ([address] A41.9    UTI (urinary tract infection) N39.0    Thrombocytopenia, unspecified D69.6     Past Medical History:   Diagnosis Date    Amputation of both lower extremities ([address]     tenderness and swollen left breeast    Aneurysm ([address]     Conjunctivitis of right eye 08/30/2016    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 ([address] 04/19/2019    Kim Barreto MD    Dyslipidemia     Gangrene ([address] 2011    bilateral amputation d/t gangrene    Hallucination     Hernia, inguinal, left 04/01/2019    Hypertension     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    Pancreatic mass     Prediabetes     Renal failure     Dr Jovon Hugo    S/P colonoscopy 1-4-08 2/26/19    ryan repeat 5 yrs    UTI (urinary tract infection)     Wound, open       Past Surgical History:   Procedure Laterality Date    COLONOSCOPY N/A 2018    COLONOSCOPY performed by Pat Lopez MD at [facility] 29      bilateral amputation    HX UROLOGICAL      left orchiectomy    NEUROLOGICAL PROCEDURE UNLISTED  2004    aneursym    HI ABDOMEN SURGERY PROC UNLISTED      colonoscopy    HI CARDIAC SURG PROCEDURE UNLIST       Current Outpatient Medications   Medication Sig Dispense Refill    tamsulosin (FLOMAX) 0.4 mg capsule Take 1 Capsule by mouth nightly. 90 Capsule 3    risperiDONE (RisperDAL) 0.5 mg tablet Take 1 Tablet by mouth two (2) times a day. Take 1 tablet at 4:00 PM and 1 tablet at bedtime 60 Tablet 11    atorvastatin (LIPITOR) 10 mg tablet Take 1 Tablet by mouth daily. 30 Tablet 11    donepeziL (ARICEPT) 5 mg tablet Take 1 Tablet by mouth nightly. 30 Tablet 11    traZODone (DESYREL) 50 mg tablet Take 50 mg by mouth nightly.  (Patient not taking: Reported on 2022)       No Known Allergies    Family History   Problem Relation Age of Onset    No Known Problems Father      Social History     Tobacco Use    Smoking status: Former     Types: Cigarettes     Quit date: 2000     Years since quittin.9    Smokeless tobacco: Never   Substance Use Topics    Alcohol use: Yes     Comment: ocassional, 3 wine coolers / year         Alejandrian Ayers No

## 2023-07-25 ENCOUNTER — OFFICE VISIT (OUTPATIENT)
Age: 73
End: 2023-07-25

## 2023-07-25 VITALS
SYSTOLIC BLOOD PRESSURE: 138 MMHG | BODY MASS INDEX: 17.07 KG/M2 | OXYGEN SATURATION: 97 % | DIASTOLIC BLOOD PRESSURE: 80 MMHG | WEIGHT: 109 LBS | HEART RATE: 94 BPM

## 2023-07-25 DIAGNOSIS — F01.50 MIXED ALZHEIMER'S AND VASCULAR DEMENTIA (HCC): Primary | ICD-10-CM

## 2023-07-25 DIAGNOSIS — G30.9 MIXED ALZHEIMER'S AND VASCULAR DEMENTIA (HCC): Primary | ICD-10-CM

## 2023-07-25 DIAGNOSIS — G24.9 DYSKINESIA: ICD-10-CM

## 2023-07-25 DIAGNOSIS — F02.80 MIXED ALZHEIMER'S AND VASCULAR DEMENTIA (HCC): Primary | ICD-10-CM

## 2023-07-25 RX ORDER — MEMANTINE HYDROCHLORIDE 10 MG/1
10 TABLET ORAL 2 TIMES DAILY
Qty: 180 TABLET | Refills: 1 | Status: SHIPPED | OUTPATIENT
Start: 2023-07-25

## 2023-07-25 ASSESSMENT — PATIENT HEALTH QUESTIONNAIRE - PHQ9
SUM OF ALL RESPONSES TO PHQ QUESTIONS 1-9: 0
2. FEELING DOWN, DEPRESSED OR HOPELESS: 0
SUM OF ALL RESPONSES TO PHQ9 QUESTIONS 1 & 2: 0
SUM OF ALL RESPONSES TO PHQ QUESTIONS 1-9: 0
1. LITTLE INTEREST OR PLEASURE IN DOING THINGS: 0
SUM OF ALL RESPONSES TO PHQ QUESTIONS 1-9: 0
SUM OF ALL RESPONSES TO PHQ QUESTIONS 1-9: 0

## 2023-07-25 NOTE — PROGRESS NOTES
Neurology Clinic Follow up Note    Patient ID:  Mirza Broderick  [id number]  1950      Mr. Sarita Davis is here for follow up today of dementia. Last Appointment With Me:  1/23/23    Interval History:   Patient returns for follow up, previously followed by Dr. Josh Pinzon for dementia. His wife reports that confused was less apparent after stopping donepezil during a UTI due to potential interaction with antibiotics. Still losing train of thought with short attention span. He needs assistance with meal prep, medications, finances. He remains independent for dressing/bathing/eating. He appears to be tolerating Namenda without side effects. Oralia-oral dyskinesias from prior antipsychotic use are still present. He denies any recent hallucinations/behavioral concerns after discontinuation of risperdal.   He is mainly wheelchair dependent due to b/l LE amputation. PMHx/ PSHx/ FHx/ SHx:  Reviewed and unchanged previous visit. 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 ([address]) 04/19/2019    Herlinda Escobar MD    Dyslipidemia     Escherichia coli urinary tract infection 03/28/2023    cipro    Gangrene ([address]) 2011    bilateral amputation d/t gangrene    Hallucination     Hernia, inguinal, left 04/01/2019    Hypertension     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        Objective:       Meds:  Current Outpatient

## 2023-07-25 NOTE — TELEPHONE ENCOUNTER
Pt wife calling to update that pt is still taking donepezil (ARICEPT) 10 MG tablet and is requesting a refill be sent to [facility] on file

## 2023-07-26 RX ORDER — DONEPEZIL HYDROCHLORIDE 10 MG/1
10 TABLET, FILM COATED ORAL NIGHTLY
Qty: 30 TABLET | Refills: 3 | OUTPATIENT
Start: 2023-07-26

## 2023-07-26 NOTE — TELEPHONE ENCOUNTER
Called patient's wife. No answer. Left a VM stating that the provider stated during the visit, they discontinued medication (Aricept) since his last visit and felt he had an improvement in memory off of medication, therefore we agreed to stay off of medication and continue with Namenda.

## 2023-07-26 NOTE — TELEPHONE ENCOUNTER
Called patient's wife. She stated she wanted to confirm with the doctor that the patient is taking donepezil 10mg-once daily and asked if he could get a script for that.

## 2023-07-31 RX ORDER — DONEPEZIL HYDROCHLORIDE 10 MG/1
10 TABLET, FILM COATED ORAL NIGHTLY
Qty: 30 TABLET | Refills: 11 | Status: SHIPPED | OUTPATIENT
Start: 2023-07-31

## 2023-09-27 ENCOUNTER — OFFICE VISIT (OUTPATIENT)
Facility: CLINIC | Age: 73
End: 2023-09-27

## 2023-09-27 VITALS
TEMPERATURE: 98 F | HEART RATE: 66 BPM | BODY MASS INDEX: 17.07 KG/M2 | SYSTOLIC BLOOD PRESSURE: 140 MMHG | HEIGHT: 67 IN | DIASTOLIC BLOOD PRESSURE: 89 MMHG | OXYGEN SATURATION: 100 % | RESPIRATION RATE: 20 BRPM

## 2023-09-27 DIAGNOSIS — F02.818 DEMENTIA DUE TO ANOTHER GENERAL MEDICAL CONDITION, WITH BEHAVIORAL DISTURBANCE (HCC): ICD-10-CM

## 2023-09-27 DIAGNOSIS — Z00.00 MEDICARE ANNUAL WELLNESS VISIT, SUBSEQUENT: Primary | ICD-10-CM

## 2023-09-27 DIAGNOSIS — D69.6 THROMBOCYTOPENIA, UNSPECIFIED (HCC): ICD-10-CM

## 2023-09-27 DIAGNOSIS — N18.31 STAGE 3A CHRONIC KIDNEY DISEASE (HCC): ICD-10-CM

## 2023-09-27 DIAGNOSIS — S88.911S AMPUTATION OF BOTH LOWER EXTREMITIES, SEQUELA (HCC): ICD-10-CM

## 2023-09-27 DIAGNOSIS — G30.9 ALZHEIMER'S DISEASE, UNSPECIFIED (CODE) (HCC): ICD-10-CM

## 2023-09-27 DIAGNOSIS — S88.912S AMPUTATION OF BOTH LOWER EXTREMITIES, SEQUELA (HCC): ICD-10-CM

## 2023-09-27 DIAGNOSIS — I10 PRIMARY HYPERTENSION: ICD-10-CM

## 2023-09-27 LAB
ANION GAP SERPL CALC-SCNC: 4 MMOL/L (ref 5–15)
APPEARANCE UR: CLEAR
BACTERIA URNS QL MICRO: NEGATIVE /HPF
BASOPHILS # BLD: 0 K/UL (ref 0–0.1)
BASOPHILS NFR BLD: 1 % (ref 0–1)
BILIRUB UR QL: NEGATIVE
BUN SERPL-MCNC: 17 MG/DL (ref 6–20)
BUN/CREAT SERPL: 13 (ref 12–20)
CALCIUM SERPL-MCNC: 9.9 MG/DL (ref 8.5–10.1)
CHLORIDE SERPL-SCNC: 104 MMOL/L (ref 97–108)
CO2 SERPL-SCNC: 31 MMOL/L (ref 21–32)
COLOR UR: NORMAL
CREAT SERPL-MCNC: 1.31 MG/DL (ref 0.7–1.3)
DIFFERENTIAL METHOD BLD: ABNORMAL
EOSINOPHIL # BLD: 0.3 K/UL (ref 0–0.4)
EOSINOPHIL NFR BLD: 6 % (ref 0–7)
EPITH CASTS URNS QL MICRO: NORMAL /LPF
ERYTHROCYTE [DISTWIDTH] IN BLOOD BY AUTOMATED COUNT: 14.8 % (ref 11.5–14.5)
GLUCOSE SERPL-MCNC: 147 MG/DL (ref 65–100)
GLUCOSE UR STRIP.AUTO-MCNC: NEGATIVE MG/DL
HCT VFR BLD AUTO: 37.4 % (ref 36.6–50.3)
HGB BLD-MCNC: 12.5 G/DL (ref 12.1–17)
HGB UR QL STRIP: NEGATIVE
HYALINE CASTS URNS QL MICRO: NORMAL /LPF (ref 0–5)
IMM GRANULOCYTES # BLD AUTO: 0 K/UL (ref 0–0.04)
IMM GRANULOCYTES NFR BLD AUTO: 0 % (ref 0–0.5)
KETONES UR QL STRIP.AUTO: NEGATIVE MG/DL
LEUKOCYTE ESTERASE UR QL STRIP.AUTO: NEGATIVE
LYMPHOCYTES # BLD: 1.8 K/UL (ref 0.8–3.5)
LYMPHOCYTES NFR BLD: 44 % (ref 12–49)
MCH RBC QN AUTO: 24.8 PG (ref 26–34)
MCHC RBC AUTO-ENTMCNC: 33.4 G/DL (ref 30–36.5)
MCV RBC AUTO: 74.2 FL (ref 80–99)
MONOCYTES # BLD: 0.5 K/UL (ref 0–1)
MONOCYTES NFR BLD: 11 % (ref 5–13)
NEUTS SEG # BLD: 1.6 K/UL (ref 1.8–8)
NEUTS SEG NFR BLD: 37 % (ref 32–75)
NITRITE UR QL STRIP.AUTO: NEGATIVE
NRBC # BLD: 0 K/UL (ref 0–0.01)
NRBC BLD-RTO: 0 PER 100 WBC
PH UR STRIP: 5.5 (ref 5–8)
PLATELET # BLD AUTO: 102 K/UL (ref 150–400)
POTASSIUM SERPL-SCNC: 4.1 MMOL/L (ref 3.5–5.1)
PROT UR STRIP-MCNC: NEGATIVE MG/DL
RBC # BLD AUTO: 5.04 M/UL (ref 4.1–5.7)
RBC #/AREA URNS HPF: NORMAL /HPF (ref 0–5)
SODIUM SERPL-SCNC: 139 MMOL/L (ref 136–145)
SP GR UR REFRACTOMETRY: 1.01 (ref 1–1.03)
URINE CULTURE IF INDICATED: NORMAL
UROBILINOGEN UR QL STRIP.AUTO: 0.2 EU/DL (ref 0.2–1)
WBC # BLD AUTO: 4.1 K/UL (ref 4.1–11.1)
WBC URNS QL MICRO: NORMAL /HPF (ref 0–4)

## 2023-09-27 SDOH — ECONOMIC STABILITY: FOOD INSECURITY: WITHIN THE PAST 12 MONTHS, YOU WORRIED THAT YOUR FOOD WOULD RUN OUT BEFORE YOU GOT MONEY TO BUY MORE.: NEVER TRUE

## 2023-09-27 SDOH — ECONOMIC STABILITY: INCOME INSECURITY: HOW HARD IS IT FOR YOU TO PAY FOR THE VERY BASICS LIKE FOOD, HOUSING, MEDICAL CARE, AND HEATING?: NOT HARD AT ALL

## 2023-09-27 SDOH — ECONOMIC STABILITY: FOOD INSECURITY: WITHIN THE PAST 12 MONTHS, THE FOOD YOU BOUGHT JUST DIDN'T LAST AND YOU DIDN'T HAVE MONEY TO GET MORE.: NEVER TRUE

## 2023-09-27 SDOH — ECONOMIC STABILITY: HOUSING INSECURITY
IN THE LAST 12 MONTHS, WAS THERE A TIME WHEN YOU DID NOT HAVE A STEADY PLACE TO SLEEP OR SLEPT IN A SHELTER (INCLUDING NOW)?: NO

## 2023-09-27 ASSESSMENT — ANXIETY QUESTIONNAIRES
2. NOT BEING ABLE TO STOP OR CONTROL WORRYING: 0
6. BECOMING EASILY ANNOYED OR IRRITABLE: 0
3. WORRYING TOO MUCH ABOUT DIFFERENT THINGS: 0
4. TROUBLE RELAXING: 0
5. BEING SO RESTLESS THAT IT IS HARD TO SIT STILL: 0
1. FEELING NERVOUS, ANXIOUS, OR ON EDGE: 0
IF YOU CHECKED OFF ANY PROBLEMS ON THIS QUESTIONNAIRE, HOW DIFFICULT HAVE THESE PROBLEMS MADE IT FOR YOU TO DO YOUR WORK, TAKE CARE OF THINGS AT HOME, OR GET ALONG WITH OTHER PEOPLE: NOT DIFFICULT AT ALL
GAD7 TOTAL SCORE: 0
7. FEELING AFRAID AS IF SOMETHING AWFUL MIGHT HAPPEN: 0

## 2023-09-27 ASSESSMENT — PATIENT HEALTH QUESTIONNAIRE - PHQ9
SUM OF ALL RESPONSES TO PHQ QUESTIONS 1-9: 0
2. FEELING DOWN, DEPRESSED OR HOPELESS: 0
1. LITTLE INTEREST OR PLEASURE IN DOING THINGS: 0
SUM OF ALL RESPONSES TO PHQ9 QUESTIONS 1 & 2: 0

## 2023-09-27 ASSESSMENT — LIFESTYLE VARIABLES
HOW OFTEN DO YOU HAVE A DRINK CONTAINING ALCOHOL: NEVER
HOW MANY STANDARD DRINKS CONTAINING ALCOHOL DO YOU HAVE ON A TYPICAL DAY: PATIENT DOES NOT DRINK

## 2023-09-27 NOTE — PROGRESS NOTES
Kieran López is a 68 y.o. male    Chief Complaint   Patient presents with    Medicare AWV     1. Have you been to the ER, urgent care clinic since your last visit? Hospitalized since your last visit? No    2. Have you seen or consulted any other health care providers outside of the [address] [place] [address] Avenue since your last visit? Include any pap smears or colon screening.  No
[facility]  Danny Nunez MD, [place], [address] [place], [place] [address]  Phone:  [phone]  Fax: [phone]      Chief Complaint   Patient presents with    Medicare AWV         SUBECTIVE:    Felicia Salgado is a 68 y.o. male Patient returns today with his wife with a known history of amputation of both lower extremities, thrombocytopenia, pancreatic mass-stable, anasarca, hyperalbuminemia, nephrotic syndrome, hypertension, hallucinations, left inguinal hernia, dyslipidemia, impaired glucose tolerance, tardive dyskinesia, recurrent UTIs, Alzheimer's disease, CKD, and is seen for evaluation. Patient himself denies complaints. Wife notes he is chewing food and sometimes spits it out and sometimes does not eat. His appetite is waning. Patient is seen for evaluation. Current Outpatient Medications   Medication Sig Dispense Refill    donepezil (ARICEPT) 10 MG tablet Take 1 tablet by mouth nightly 30 tablet 11    memantine (NAMENDA) 10 MG tablet Take 1 tablet by mouth 2 times daily 180 tablet 1    atorvastatin (LIPITOR) 10 MG tablet Take by mouth daily      tamsulosin (FLOMAX) 0.4 MG capsule Take 1 capsule by mouth nightly       No current facility-administered medications for this visit.      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     
outside providers/suppliers regularly involved in providing care):   Patient Care Team:  Bib Hernandez MD as PCP - Sarita Jean Baptiste MD as PCP - Empaneled Provider     Reviewed and updated this visit:  Tobacco  Allergies  Meds  Med Hx  Surg Hx  Soc Hx  Fam Hx

## 2023-10-18 RX ORDER — ATORVASTATIN CALCIUM 10 MG/1
10 TABLET, FILM COATED ORAL DAILY
Qty: 30 TABLET | Refills: 11 | Status: SHIPPED | OUTPATIENT
Start: 2023-10-18

## 2024-01-26 ENCOUNTER — OFFICE VISIT (OUTPATIENT)
Age: 74
End: 2024-01-26
Payer: MEDICARE

## 2024-01-26 VITALS — SYSTOLIC BLOOD PRESSURE: 140 MMHG | OXYGEN SATURATION: 96 % | HEART RATE: 68 BPM | DIASTOLIC BLOOD PRESSURE: 60 MMHG

## 2024-01-26 DIAGNOSIS — F02.80 MIXED ALZHEIMER'S AND VASCULAR DEMENTIA (HCC): Primary | ICD-10-CM

## 2024-01-26 DIAGNOSIS — G30.9 MIXED ALZHEIMER'S AND VASCULAR DEMENTIA (HCC): Primary | ICD-10-CM

## 2024-01-26 DIAGNOSIS — G24.9 DYSKINESIA: ICD-10-CM

## 2024-01-26 DIAGNOSIS — F01.50 MIXED ALZHEIMER'S AND VASCULAR DEMENTIA (HCC): Primary | ICD-10-CM

## 2024-01-26 PROCEDURE — 3017F COLORECTAL CA SCREEN DOC REV: CPT | Performed by: PSYCHIATRY & NEUROLOGY

## 2024-01-26 PROCEDURE — 3078F DIAST BP <80 MM HG: CPT | Performed by: PSYCHIATRY & NEUROLOGY

## 2024-01-26 PROCEDURE — 3077F SYST BP >= 140 MM HG: CPT | Performed by: PSYCHIATRY & NEUROLOGY

## 2024-01-26 PROCEDURE — 1036F TOBACCO NON-USER: CPT | Performed by: PSYCHIATRY & NEUROLOGY

## 2024-01-26 PROCEDURE — G8484 FLU IMMUNIZE NO ADMIN: HCPCS | Performed by: PSYCHIATRY & NEUROLOGY

## 2024-01-26 PROCEDURE — 1123F ACP DISCUSS/DSCN MKR DOCD: CPT | Performed by: PSYCHIATRY & NEUROLOGY

## 2024-01-26 PROCEDURE — G8427 DOCREV CUR MEDS BY ELIG CLIN: HCPCS | Performed by: PSYCHIATRY & NEUROLOGY

## 2024-01-26 PROCEDURE — G8419 CALC BMI OUT NRM PARAM NOF/U: HCPCS | Performed by: PSYCHIATRY & NEUROLOGY

## 2024-01-26 PROCEDURE — 99214 OFFICE O/P EST MOD 30 MIN: CPT | Performed by: PSYCHIATRY & NEUROLOGY

## 2024-01-26 RX ORDER — DONEPEZIL HYDROCHLORIDE 10 MG/1
10 TABLET, FILM COATED ORAL NIGHTLY
Qty: 90 TABLET | Refills: 1 | Status: SHIPPED | OUTPATIENT
Start: 2024-01-26

## 2024-01-26 RX ORDER — MEMANTINE HYDROCHLORIDE 10 MG/1
10 TABLET ORAL 2 TIMES DAILY
Qty: 180 TABLET | Refills: 1 | Status: SHIPPED | OUTPATIENT
Start: 2024-01-26

## 2024-01-26 NOTE — PROGRESS NOTES
Objective:       Meds:  Current Outpatient Medications   Medication Sig Dispense Refill    atorvastatin (LIPITOR) 10 MG tablet TAKE 1 TABLET BY MOUTH DAILY 30 tablet 11    donepezil (ARICEPT) 10 MG tablet Take 1 tablet by mouth nightly 30 tablet 11    memantine (NAMENDA) 10 MG tablet Take 1 tablet by mouth 2 times daily 180 tablet 1    tamsulosin (FLOMAX) 0.4 MG capsule Take 1 capsule by mouth nightly       No current facility-administered medications for this visit.       Exam:  Visit Vitals  BP (!) 140/60   Pulse 68   SpO2 96%   NEUROLOGICAL EXAM:  General: Awake, alert, speech fluent. Disoriented to date, knows self, place. Delayed recall: 0/5  CN: PERRL, EOMI without nystagmus, VFF to confrontation, facial sensation and strength are normal and symmetric, hearing is intact to finger rub bilaterally, palate and tongue movements are intact and symmetric. +Oral-lingual dyskinesias (improved).   Motor: UE 5/5 throughout, LE b/l amputation proximal 5/5.  Reflexes: Deferred  Coordination: FNF intact b/l  Sensation: LT intact throughout.  Gait: Cannot assess, wheelchair dependent    LABS  Office Visit on 09/27/2023   Component Date Value    Color, UA 09/27/2023 YELLOW/STRAW     Appearance 09/27/2023 CLEAR     Specific Gravity, UA 09/27/2023 1.009     pH, Urine 09/27/2023 5.5     Protein, UA 09/27/2023 Negative     Glucose, UA 09/27/2023 Negative     Ketones, Urine 09/27/2023 Negative     Bilirubin Urine 09/27/2023 Negative     Blood, Urine 09/27/2023 Negative     Urobilinogen, Urine 09/27/2023 0.2     Nitrite, Urine 09/27/2023 Negative     Leukocyte Esterase, Urine 09/27/2023 Negative     Urine Culture if Indicat* 09/27/2023 CULTURE NOT INDICATED BY UA RESULT     WBC, UA 09/27/2023 0-4     RBC, UA 09/27/2023 0-5     Epithelial Cells UA 09/27/2023 FEW     BACTERIA, URINE 09/27/2023 Negative     Hyaline Casts, UA 09/27/2023 0-2     WBC 09/27/2023 4.1     RBC 09/27/2023 5.04     Hemoglobin 09/27/2023 12.5

## 2024-03-27 RX ORDER — TAMSULOSIN HYDROCHLORIDE 0.4 MG/1
0.4 CAPSULE ORAL NIGHTLY
Qty: 90 CAPSULE | Refills: 3 | Status: SHIPPED | OUTPATIENT
Start: 2024-03-27

## 2024-05-02 ENCOUNTER — OFFICE VISIT (OUTPATIENT)
Facility: CLINIC | Age: 74
End: 2024-05-02

## 2024-05-02 VITALS
DIASTOLIC BLOOD PRESSURE: 79 MMHG | TEMPERATURE: 98 F | RESPIRATION RATE: 18 BRPM | HEIGHT: 67 IN | SYSTOLIC BLOOD PRESSURE: 122 MMHG | BODY MASS INDEX: 17.07 KG/M2 | OXYGEN SATURATION: 98 % | HEART RATE: 79 BPM

## 2024-05-02 DIAGNOSIS — D69.6 THROMBOCYTOPENIA, UNSPECIFIED (HCC): Primary | ICD-10-CM

## 2024-05-02 DIAGNOSIS — I10 PRIMARY HYPERTENSION: ICD-10-CM

## 2024-05-02 DIAGNOSIS — R44.3 HALLUCINATION: ICD-10-CM

## 2024-05-02 DIAGNOSIS — F02.818 DEMENTIA DUE TO ANOTHER GENERAL MEDICAL CONDITION, WITH BEHAVIORAL DISTURBANCE (HCC): ICD-10-CM

## 2024-05-02 DIAGNOSIS — S88.912S AMPUTATION OF BOTH LOWER EXTREMITIES, SEQUELA (HCC): ICD-10-CM

## 2024-05-02 DIAGNOSIS — S88.911S AMPUTATION OF BOTH LOWER EXTREMITIES, SEQUELA (HCC): ICD-10-CM

## 2024-05-02 DIAGNOSIS — G30.9 ALZHEIMER'S DISEASE, UNSPECIFIED (CODE) (HCC): ICD-10-CM

## 2024-05-02 DIAGNOSIS — R35.1 NOCTURIA: ICD-10-CM

## 2024-05-02 DIAGNOSIS — R73.02 IGT (IMPAIRED GLUCOSE TOLERANCE): ICD-10-CM

## 2024-05-02 DIAGNOSIS — N18.31 STAGE 3A CHRONIC KIDNEY DISEASE (HCC): ICD-10-CM

## 2024-05-02 SDOH — ECONOMIC STABILITY: INCOME INSECURITY: HOW HARD IS IT FOR YOU TO PAY FOR THE VERY BASICS LIKE FOOD, HOUSING, MEDICAL CARE, AND HEATING?: NOT HARD AT ALL

## 2024-05-02 SDOH — ECONOMIC STABILITY: FOOD INSECURITY: WITHIN THE PAST 12 MONTHS, YOU WORRIED THAT YOUR FOOD WOULD RUN OUT BEFORE YOU GOT MONEY TO BUY MORE.: NEVER TRUE

## 2024-05-02 SDOH — ECONOMIC STABILITY: FOOD INSECURITY: WITHIN THE PAST 12 MONTHS, THE FOOD YOU BOUGHT JUST DIDN'T LAST AND YOU DIDN'T HAVE MONEY TO GET MORE.: NEVER TRUE

## 2024-05-02 ASSESSMENT — ANXIETY QUESTIONNAIRES
GAD7 TOTAL SCORE: 0
4. TROUBLE RELAXING: NOT AT ALL
5. BEING SO RESTLESS THAT IT IS HARD TO SIT STILL: NOT AT ALL
7. FEELING AFRAID AS IF SOMETHING AWFUL MIGHT HAPPEN: NOT AT ALL
6. BECOMING EASILY ANNOYED OR IRRITABLE: NOT AT ALL
IF YOU CHECKED OFF ANY PROBLEMS ON THIS QUESTIONNAIRE, HOW DIFFICULT HAVE THESE PROBLEMS MADE IT FOR YOU TO DO YOUR WORK, TAKE CARE OF THINGS AT HOME, OR GET ALONG WITH OTHER PEOPLE: NOT DIFFICULT AT ALL
2. NOT BEING ABLE TO STOP OR CONTROL WORRYING: NOT AT ALL
1. FEELING NERVOUS, ANXIOUS, OR ON EDGE: NOT AT ALL
3. WORRYING TOO MUCH ABOUT DIFFERENT THINGS: NOT AT ALL

## 2024-05-02 ASSESSMENT — PATIENT HEALTH QUESTIONNAIRE - PHQ9
SUM OF ALL RESPONSES TO PHQ QUESTIONS 1-9: 0
SUM OF ALL RESPONSES TO PHQ9 QUESTIONS 1 & 2: 0
1. LITTLE INTEREST OR PLEASURE IN DOING THINGS: NOT AT ALL
SUM OF ALL RESPONSES TO PHQ QUESTIONS 1-9: 0
2. FEELING DOWN, DEPRESSED OR HOPELESS: NOT AT ALL
SUM OF ALL RESPONSES TO PHQ QUESTIONS 1-9: 0
SUM OF ALL RESPONSES TO PHQ QUESTIONS 1-9: 0

## 2024-05-02 NOTE — PROGRESS NOTES
Chief Complaint   Patient presents with    Follow-up    Hypertension     Patient here for follow up high blood pressure.      \"Have you been to the ER, urgent care clinic since your last visit?  Hospitalized since your last visit?\"    NO    “Have you seen or consulted any other health care providers outside of [facility] since your last visit?”    NO          Click Here for Release of Records Request

## 2024-05-02 NOTE — PROGRESS NOTES
[facility]  Joce Killian MD, FACP, CMD  [address] [facility] [address]  Phone:  [phone]  Fax: [phone]       Chief Complaint   Patient presents with    Follow-up    Hypertension     Patient here for follow up high blood pressure.    .      SUBJECTIVE:    Alejandra Delgado is a 73 y.o. male Patient returns today with a known history of thrombocytopenia, dementia, chronic kidney disease, amputation of bilateral lower extremities, impaired glucose tolerance, primary hypertension, dyslipidemia and is seen for evaluation.             Current Outpatient Medications   Medication Sig Dispense Refill    tamsulosin (FLOMAX) 0.4 MG capsule TAKE 1 CAPSULE BY MOUTH EVERY NIGHT 90 capsule 3    Cholecalciferol (VITAMIN D3) 125 MCG (5000 UT) CAPS Take by mouth daily      donepezil (ARICEPT) 10 MG tablet Take 1 tablet by mouth nightly 90 tablet 1    memantine (NAMENDA) 10 MG tablet Take 1 tablet by mouth 2 times daily 180 tablet 1    atorvastatin (LIPITOR) 10 MG tablet TAKE 1 TABLET BY MOUTH DAILY 30 tablet 11     No current facility-administered medications for this visit.     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

## 2024-05-03 LAB
ALBUMIN SERPL-MCNC: 3.8 G/DL (ref 3.5–5)
ALBUMIN/GLOB SERPL: 0.9 (ref 1.1–2.2)
ALP SERPL-CCNC: 94 U/L (ref 45–117)
ALT SERPL-CCNC: 22 U/L (ref 12–78)
ANION GAP SERPL CALC-SCNC: 3 MMOL/L (ref 5–15)
APPEARANCE UR: CLEAR
AST SERPL-CCNC: 21 U/L (ref 15–37)
BACTERIA URNS QL MICRO: NEGATIVE /HPF
BASOPHILS # BLD: 0.1 K/UL (ref 0–0.1)
BASOPHILS NFR BLD: 1 % (ref 0–1)
BILIRUB SERPL-MCNC: 0.8 MG/DL (ref 0.2–1)
BILIRUB UR QL: NEGATIVE
BUN SERPL-MCNC: 21 MG/DL (ref 6–20)
BUN/CREAT SERPL: 15 (ref 12–20)
CALCIUM SERPL-MCNC: 10.3 MG/DL (ref 8.5–10.1)
CHLORIDE SERPL-SCNC: 104 MMOL/L (ref 97–108)
CHOLEST SERPL-MCNC: 170 MG/DL
CO2 SERPL-SCNC: 31 MMOL/L (ref 21–32)
COLOR UR: NORMAL
COMMENT:: NORMAL
CREAT SERPL-MCNC: 1.42 MG/DL (ref 0.7–1.3)
DIFFERENTIAL METHOD BLD: ABNORMAL
EOSINOPHIL # BLD: 0.4 K/UL (ref 0–0.4)
EOSINOPHIL NFR BLD: 7 % (ref 0–7)
EPITH CASTS URNS QL MICRO: NORMAL /LPF
ERYTHROCYTE [DISTWIDTH] IN BLOOD BY AUTOMATED COUNT: 14.7 % (ref 11.5–14.5)
EST. AVERAGE GLUCOSE BLD GHB EST-MCNC: 103 MG/DL
GLOBULIN SER CALC-MCNC: 4.1 G/DL (ref 2–4)
GLUCOSE SERPL-MCNC: 78 MG/DL (ref 65–100)
GLUCOSE UR STRIP.AUTO-MCNC: NEGATIVE MG/DL
HBA1C MFR BLD: 5.2 % (ref 4–5.6)
HCT VFR BLD AUTO: 36.1 % (ref 36.6–50.3)
HDLC SERPL-MCNC: 87 MG/DL
HDLC SERPL: 2 (ref 0–5)
HGB BLD-MCNC: 12.3 G/DL (ref 12.1–17)
HGB UR QL STRIP: NEGATIVE
IMM GRANULOCYTES # BLD AUTO: 0 K/UL (ref 0–0.04)
IMM GRANULOCYTES NFR BLD AUTO: 0 % (ref 0–0.5)
KETONES UR QL STRIP.AUTO: NEGATIVE MG/DL
LDLC SERPL CALC-MCNC: 73.8 MG/DL (ref 0–100)
LEUKOCYTE ESTERASE UR QL STRIP.AUTO: NEGATIVE
LYMPHOCYTES # BLD: 2.2 K/UL (ref 0.8–3.5)
LYMPHOCYTES NFR BLD: 45 % (ref 12–49)
MCH RBC QN AUTO: 25.1 PG (ref 26–34)
MCHC RBC AUTO-ENTMCNC: 34.1 G/DL (ref 30–36.5)
MCV RBC AUTO: 73.7 FL (ref 80–99)
MONOCYTES # BLD: 0.4 K/UL (ref 0–1)
MONOCYTES NFR BLD: 8 % (ref 5–13)
NEUTS SEG # BLD: 2 K/UL (ref 1.8–8)
NEUTS SEG NFR BLD: 39 % (ref 32–75)
NITRITE UR QL STRIP.AUTO: NEGATIVE
NRBC # BLD: 0 K/UL (ref 0–0.01)
NRBC BLD-RTO: 0 PER 100 WBC
PH UR STRIP: 5.5 (ref 5–8)
PLATELET # BLD AUTO: 98 K/UL (ref 150–400)
POTASSIUM SERPL-SCNC: 4 MMOL/L (ref 3.5–5.1)
PROT SERPL-MCNC: 7.9 G/DL (ref 6.4–8.2)
PROT UR STRIP-MCNC: NEGATIVE MG/DL
PSA SERPL-MCNC: 0.9 NG/ML (ref 0.01–4)
RBC # BLD AUTO: 4.9 M/UL (ref 4.1–5.7)
RBC #/AREA URNS HPF: NORMAL /HPF (ref 0–5)
RBC MORPH BLD: ABNORMAL
SODIUM SERPL-SCNC: 138 MMOL/L (ref 136–145)
SP GR UR REFRACTOMETRY: 1.02 (ref 1–1.03)
SPECIMEN HOLD: NORMAL
TRIGL SERPL-MCNC: 46 MG/DL
UROBILINOGEN UR QL STRIP.AUTO: 0.2 EU/DL (ref 0.2–1)
VLDLC SERPL CALC-MCNC: 9.2 MG/DL
WBC # BLD AUTO: 5.1 K/UL (ref 4.1–11.1)
WBC URNS QL MICRO: NORMAL /HPF (ref 0–4)

## 2024-08-02 ENCOUNTER — TELEPHONE (OUTPATIENT)
Age: 74
End: 2024-08-02

## 2024-08-02 DIAGNOSIS — F01.50 MIXED ALZHEIMER'S AND VASCULAR DEMENTIA (HCC): ICD-10-CM

## 2024-08-02 DIAGNOSIS — G30.9 MIXED ALZHEIMER'S AND VASCULAR DEMENTIA (HCC): ICD-10-CM

## 2024-08-02 DIAGNOSIS — F02.80 MIXED ALZHEIMER'S AND VASCULAR DEMENTIA (HCC): ICD-10-CM

## 2024-08-02 RX ORDER — MEMANTINE HYDROCHLORIDE 10 MG/1
10 TABLET ORAL 2 TIMES DAILY
Qty: 60 TABLET | Refills: 0 | Status: SHIPPED | OUTPATIENT
Start: 2024-08-02

## 2024-08-02 NOTE — TELEPHONE ENCOUNTER
Patient's wife called to request a refill of her 's memantine to be sent to the [facility] on file.

## 2024-08-28 ENCOUNTER — OFFICE VISIT (OUTPATIENT)
Age: 74
End: 2024-08-28
Payer: MEDICARE

## 2024-08-28 VITALS — OXYGEN SATURATION: 98 % | HEART RATE: 79 BPM | SYSTOLIC BLOOD PRESSURE: 142 MMHG | DIASTOLIC BLOOD PRESSURE: 80 MMHG

## 2024-08-28 DIAGNOSIS — F01.50 MIXED ALZHEIMER'S AND VASCULAR DEMENTIA (HCC): Primary | ICD-10-CM

## 2024-08-28 DIAGNOSIS — G24.9 DYSKINESIA: ICD-10-CM

## 2024-08-28 DIAGNOSIS — F02.80 MIXED ALZHEIMER'S AND VASCULAR DEMENTIA (HCC): Primary | ICD-10-CM

## 2024-08-28 DIAGNOSIS — G30.9 MIXED ALZHEIMER'S AND VASCULAR DEMENTIA (HCC): Primary | ICD-10-CM

## 2024-08-28 PROCEDURE — 3079F DIAST BP 80-89 MM HG: CPT | Performed by: PSYCHIATRY & NEUROLOGY

## 2024-08-28 PROCEDURE — 3017F COLORECTAL CA SCREEN DOC REV: CPT | Performed by: PSYCHIATRY & NEUROLOGY

## 2024-08-28 PROCEDURE — 1123F ACP DISCUSS/DSCN MKR DOCD: CPT | Performed by: PSYCHIATRY & NEUROLOGY

## 2024-08-28 PROCEDURE — G8419 CALC BMI OUT NRM PARAM NOF/U: HCPCS | Performed by: PSYCHIATRY & NEUROLOGY

## 2024-08-28 PROCEDURE — 3077F SYST BP >= 140 MM HG: CPT | Performed by: PSYCHIATRY & NEUROLOGY

## 2024-08-28 PROCEDURE — 1036F TOBACCO NON-USER: CPT | Performed by: PSYCHIATRY & NEUROLOGY

## 2024-08-28 PROCEDURE — G8427 DOCREV CUR MEDS BY ELIG CLIN: HCPCS | Performed by: PSYCHIATRY & NEUROLOGY

## 2024-08-28 PROCEDURE — 99214 OFFICE O/P EST MOD 30 MIN: CPT | Performed by: PSYCHIATRY & NEUROLOGY

## 2024-08-28 RX ORDER — DONEPEZIL HYDROCHLORIDE 10 MG/1
10 TABLET, FILM COATED ORAL NIGHTLY
Qty: 90 TABLET | Refills: 1 | Status: SHIPPED | OUTPATIENT
Start: 2024-08-28

## 2024-08-28 RX ORDER — MEMANTINE HYDROCHLORIDE 10 MG/1
10 TABLET ORAL 2 TIMES DAILY
Qty: 180 TABLET | Refills: 1 | Status: SHIPPED | OUTPATIENT
Start: 2024-08-28

## 2024-08-28 NOTE — PROGRESS NOTES
Neurology Clinic Follow up Note    Patient ID:  Alejandra Delgado  [id number]  1950      Mr. Delgado is here for follow up today of dementia.       Last Appointment With Me:  1/26/2024    Interval History:   Patient returns for follow up, previously followed by Dr. Jean for dementia.   Family reports slow ongoing decline in memory since his last visit.   He needs assistance with meal prep, medications, finances. He remains independent for dressing/bathing/eating.   He is eating less overall with some noted weight loss.   He appears to be tolerating Namenda/Aricept without noted side effects.   Oralia-oral dyskinesias from prior antipsychotic use are stable. He denies any recent hallucinations/behavioral concerns after discontinuation of risperdal.   He is mainly wheelchair dependent due to b/l LE amputation.     PMHx/ PSHx/ FHx/ SHx:  Reviewed and unchanged previous visit.   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 ([facility]) 04/19/2019    Sultana SUMIT Chowdary MD    Dyslipidemia     Escherichia coli urinary tract infection 03/28/2023    cipro    Gangrene ([facility]) 2011    bilateral amputation d/t gangrene    Hallucination     Hernia, inguinal, left 04/01/2019    Hypertension     Inferior vena cava embolism ([facility]) 05/23/2011    Pancreatic mass     Prediabetes     Renal failure     Dr Fletcher    S/P colonoscopy 1-4-08 2/26/19    willie repeat 5 yrs    Urinary tract infection due to Pseudomonas aeruginosa 02/24/2022    cipro    Wound, open        ROS:  Comprehensive review of systems negative except for as noted above.       Objective:       Meds:  Current Outpatient Medications   Medication Sig Dispense Refill    tamsulosin (FLOMAX)  SMEAR SCANNED     RBC Comment 05/02/2024                      Value:MICROCYTOSIS  1+      Sodium 05/02/2024 138     Potassium 05/02/2024 4.0     Chloride 05/02/2024 104     CO2 05/02/2024 31     Anion Gap 05/02/2024 3 (L)     Glucose 05/02/2024 78     BUN 05/02/2024 21 (H)     Creatinine 05/02/2024 1.42 (H)     BUN/Creatinine Ratio 05/02/2024 15     Est, Glom Filt Rate 05/02/2024 53 (L)     Calcium 05/02/2024 10.3 (H)     Total Bilirubin 05/02/2024 0.8     ALT 05/02/2024 22     AST 05/02/2024 21     Alk Phosphatase 05/02/2024 94     Total Protein 05/02/2024 7.9     Albumin 05/02/2024 3.8     Globulin 05/02/2024 4.1 (H)     Albumin/Globulin Ratio 05/02/2024 0.9 (L)     Cholesterol, Total 05/02/2024 170     Triglycerides 05/02/2024 46     HDL 05/02/2024 87     LDL Cholesterol 05/02/2024 73.8     VLDL Cholesterol Calcula* 05/02/2024 9.2     Chol/HDL Ratio 05/02/2024 2.0     Color, UA 05/02/2024 YELLOW/STRAW     Appearance 05/02/2024 CLEAR     Specific Gravity, UA 05/02/2024 1.018     pH, Urine 05/02/2024 5.5     Protein, UA 05/02/2024 Negative     Glucose, Ur 05/02/2024 Negative     Ketones, Urine 05/02/2024 Negative     Bilirubin, Urine 05/02/2024 Negative     Blood, Urine 05/02/2024 Negative     Urobilinogen, Urine 05/02/2024 0.2     Nitrite, Urine 05/02/2024 Negative     Leukocyte Esterase, Urine 05/02/2024 Negative     WBC, UA 05/02/2024 0-4     RBC, UA 05/02/2024 0-5     Epithelial Cells, UA 05/02/2024 FEW     BACTERIA, URINE 05/02/2024 Negative     PSA 05/02/2024 0.9     Hemoglobin A1C 05/02/2024 5.2     Estimated Avg Glucose 05/02/2024 103     Specimen HOld 05/02/2024 1sst     Comment: 05/02/2024 Add-on orders for these samples will be processed based on acceptable specimen integrity and analyte stability, which may vary by analyte.      IMAGING  CT Results (maximum last 3):  CT HEAD WO CONT    Narrative  EXAM: CT HEAD WO CONT    INDICATION: Increased confusion and lethargy, likely sepsis, assess for

## 2024-08-28 NOTE — PROGRESS NOTES
[address]2222 (16 minute minimum)  __16_ minutes were spent administering cognitive testing by medical assistant/nurse.   30054 (31 minute minimum)   _31__ minutes were spent reviewing and interpreting the cognitive testing results, integrating patient data, and discussing the results with the patient.

## 2024-09-29 NOTE — ANESTHESIA POSTPROCEDURE EVALUATION
Procedure(s): 
COLONOSCOPY 
POLYPECTOMY. Anesthesia Post Evaluation Patient location during evaluation: PACU Patient participation: complete - patient participated Level of consciousness: awake and alert Pain management: adequate Airway patency: patent Anesthetic complications: no 
Cardiovascular status: acceptable Respiratory status: acceptable Hydration status: acceptable Post anesthesia nausea and vomiting:  none Visit Vitals BP (!) 123/97 (BP 1 Location: Left arm, BP Patient Position: At rest) Pulse 89 Temp 36.4 °C (97.6 °F) Resp 22 Wt 36.7 kg (81 lb) SpO2 100% BMI 12.69 kg/m² Your child received an enema while in the ER to start the process of cleaning out their intestines. To continue the treatment of your child’s constipation, follow the Constipation Treatment Plan, as discussed.     3-Day Cleanout: Pick-up the prescription for Senna and give as prescribed once daily before bedtime for three days. At the same time, give the correct weight-based dose amount of MiraLax twice per day (in the morning and the evening) for three days.   Maintenance Cleanout: continue using MiraLax ONCE daily until you can follow-up with your child’s pediatrician to discuss ongoing management for your child’s constipation.     Constipation is a very common problem in kids. It usually isn't a cause for concern. Healthy eating and exercise habits can help prevent it. Constipation can be due to a diet that doesn't include enough water and fiber, which help the bowels move as they should. Some kids avoid going to the bathroom, even when they really have the urge to go, because they don't want to use a restroom away from home, stop playing a fun game, or have to ask an adult to be excused to go. Ignoring the urge to go makes it harder to go later. Stress also can lead to constipation. Kids can get constipated when they're anxious about something, like starting at a new school or problems at home.      Give your child more liquids. Drinking enough water and other liquids helps poop move more easily through the intestines.      Make sure your kids eat more fiber. High-fiber foods (such as fruits, vegetables, and whole-grain bread) can help prevent constipation. Try apples, pears, beans, oatmeal, oranges, ripe bananas, whole-grain breads, and popcorn. Adding flax meal or bran to homemade fruit smoothies is another way to add fiber.     Physical activity helps the bowels get into action, so encourage your child to get enough exercise.     Develop a regular meal schedule so your child is eating around the same time  each day.     Get your child into the habit of going. If your child fights the urge to go to the bathroom, have them sit on the toilet for at least 10 minutes at the same time each day, preferably after a meal.     Return to the ER for any new or worsening symptoms including inability to pass a bowel movement, severe abdominal pain, uncontrollable vomiting, inability to keep down liquids, fevers >=100.4°F for more than 3 days or that returns after being gone more than 24 hours, or other concerns.

## 2024-10-21 RX ORDER — ATORVASTATIN CALCIUM 10 MG/1
10 TABLET, FILM COATED ORAL DAILY
Qty: 30 TABLET | Refills: 11 | Status: SHIPPED | OUTPATIENT
Start: 2024-10-21 | End: 2024-10-22 | Stop reason: SDUPTHER

## 2024-10-22 RX ORDER — ATORVASTATIN CALCIUM 10 MG/1
10 TABLET, FILM COATED ORAL DAILY
Qty: 90 TABLET | Refills: 3 | Status: SHIPPED | OUTPATIENT
Start: 2024-10-22

## 2024-12-12 ENCOUNTER — OFFICE VISIT (OUTPATIENT)
Facility: CLINIC | Age: 74
End: 2024-12-12

## 2024-12-12 VITALS
OXYGEN SATURATION: 99 % | TEMPERATURE: 97.3 F | SYSTOLIC BLOOD PRESSURE: 128 MMHG | RESPIRATION RATE: 16 BRPM | HEART RATE: 80 BPM | BODY MASS INDEX: 17.07 KG/M2 | HEIGHT: 67 IN | DIASTOLIC BLOOD PRESSURE: 82 MMHG

## 2024-12-12 DIAGNOSIS — Z00.00 MEDICARE ANNUAL WELLNESS VISIT, SUBSEQUENT: Primary | ICD-10-CM

## 2024-12-12 DIAGNOSIS — F02.818 DEMENTIA DUE TO ANOTHER GENERAL MEDICAL CONDITION, WITH BEHAVIORAL DISTURBANCE (HCC): ICD-10-CM

## 2024-12-12 DIAGNOSIS — N18.31 STAGE 3A CHRONIC KIDNEY DISEASE (HCC): ICD-10-CM

## 2024-12-12 DIAGNOSIS — I10 PRIMARY HYPERTENSION: ICD-10-CM

## 2024-12-12 DIAGNOSIS — S88.912S AMPUTATION OF BOTH LOWER EXTREMITIES, SEQUELA (HCC): ICD-10-CM

## 2024-12-12 DIAGNOSIS — S88.911S AMPUTATION OF BOTH LOWER EXTREMITIES, SEQUELA (HCC): ICD-10-CM

## 2024-12-12 ASSESSMENT — PATIENT HEALTH QUESTIONNAIRE - PHQ9
SUM OF ALL RESPONSES TO PHQ9 QUESTIONS 1 & 2: 0
2. FEELING DOWN, DEPRESSED OR HOPELESS: NOT AT ALL
SUM OF ALL RESPONSES TO PHQ QUESTIONS 1-9: 0
1. LITTLE INTEREST OR PLEASURE IN DOING THINGS: NOT AT ALL
SUM OF ALL RESPONSES TO PHQ QUESTIONS 1-9: 0

## 2024-12-12 ASSESSMENT — LIFESTYLE VARIABLES
HOW MANY STANDARD DRINKS CONTAINING ALCOHOL DO YOU HAVE ON A TYPICAL DAY: PATIENT DOES NOT DRINK
HOW OFTEN DO YOU HAVE A DRINK CONTAINING ALCOHOL: NEVER

## 2024-12-12 NOTE — PATIENT INSTRUCTIONS
doctor recommends it, get more exercise. For many people, walking is a good choice. Or you may want to swim, bike, or do other activities. Bit by bit, increase the time you're active every day. Try for at least 30 minutes on most days of the week.     Try to quit or cut back on using tobacco and other nicotine products. This includes smoking and vaping. If you need help quitting, talk to your doctor about stop-smoking programs and medicines. These can increase your chances of quitting for good. Quitting is one of the most important things you can do to protect your heart. It is never too late to quit. Try to avoid secondhand smoke too.     Stay at a weight that's healthy for you. Talk to your doctor if you need help losing weight.     Try to get 7 to 9 hours of sleep each night.     Limit alcohol to 2 drinks a day for men and 1 drink a day for women. Too much alcohol can cause health problems.     Manage other health problems such as diabetes, high blood pressure, and high cholesterol. If you think you may have a problem with alcohol or drug use, talk to your doctor.   Medicines    Take your medicines exactly as prescribed. Call your doctor if you think you are having a problem with your medicine.     If your doctor recommends aspirin, take the amount directed each day. Make sure you take aspirin and not another kind of pain reliever, such as acetaminophen (Tylenol).   When should you call for help?   Call 911 if you have symptoms of a heart attack. These may include:    Chest pain or pressure, or a strange feeling in the chest.     Sweating.     Shortness of breath.     Pain, pressure, or a strange feeling in the back, neck, jaw, or upper belly or in one or both shoulders or arms.     Lightheadedness or sudden weakness.     A fast or irregular heartbeat.   After you call 911, the  may tell you to chew 1 adult-strength or 2 to 4 low-dose aspirin. Wait for an ambulance. Do not try to drive yourself.  Watch

## 2024-12-12 NOTE — PROGRESS NOTES
Chief Complaint   Patient presents with    Medicare AWV     Patient states \" he is present for a follow-up.\"    \"Have you been to the ER, urgent care clinic since your last visit?  Hospitalized since your last visit?\"    NO    “Have you seen or consulted any other health care providers outside our system since your last visit?”    NO         
Medicare Annual Wellness Visit    Alejandra Delgado is here for Medicare AWV    Assessment & Plan   Medicare annual wellness visit, subsequent  Recommendations for Preventive Services Due: see orders and patient instructions/AVS.  Recommended screening schedule for the next 5-10 years is provided to the patient in written form: see Patient Instructions/AVS.     No follow-ups on file.     Subjective       Patient's comple     Dentist Screen:  Have you seen the dentist within the past year?: (!) No    Intervention:       Vision Screen:  Do you have difficulty driving, watching TV, or doing any of your daily activities because of your eyesight?: No  Have you had an eye exam within the past year?: (!) No  Interventions:   See A/P for any pertinent orders    Safety:  Do you have any tripping hazards - loose or unsecured carpets or rugs?: (!) Yes  Do you have non-slip mats or non-slip surfaces or shower bars or grab bars in your shower or bathtub?: (!) No  Interventions:      ADL's:   Patient reports needing help with:  Select all that apply: (!) Bathing, Walking/Balance  Select all that apply: (!) Housekeeping, Food Preparation, Transportation, Laundry  Interventions:  See A/P for plan and any pertinent orders    Advanced Directives:  Do you have a Living Will?: (!) No    Intervention:  has NO advanced directive - information provided                     Objective   Vitals:    12/12/24 1254   BP: (!) 157/100   Site: Left Upper Arm   Position: Sitting   Cuff Size: Large Adult   Pulse: 80   Resp: 16   Temp: 97.3 °F (36.3 °C)   TempSrc: Oral   SpO2: 99%   Height: 1.702 m (5' 7\")      Body mass index is 17.07 kg/m².                No Known Allergies  Prior to Visit Medications    Medication Sig Taking? Authorizing Provider   atorvastatin (LIPITOR) 10 MG tablet Take 1 tablet by mouth daily Yes Joce Killian MD   donepezil (ARICEPT) 10 MG tablet Take 1 tablet by mouth nightly Yes Ele Adler, DO   memantine 
bowel sounds active  HEMATOLOGIC: no pathological lymph nodes palpated  MUSCULOSKELETAL: Extremities: no edema, pulse 1+   INTEGUMENT: No unusual rashes or suspicious skin lesions noted. Nails appear normal.  NEUROLOGIC: non-focal exam   MENTAL STATUS: alert and oriented, appropriate affect             Assessment & Plan  1. Medicare wellness examination - This completes the Medicare wellness exam. Advanced care planning discussed with his wife. He wanted to be resuscitated and requests a full CODE STATUS.  - Advanced care planning discussed with his wife  - Requests a full CODE STATUS    2. Dementia - His dementia appears to be stable, although it is inherently progressive.    3. Chronic kidney disease - He is under the care of a nephrologist for his chronic kidney disease. A urine sample was brought in by his wife for testing today. A blood sample will also be collected to monitor his kidney function.  - Urine sample brought in for testing  - Blood sample to be collected    4. Amputation of bilateral lower extremities - There has been no change in his condition following the amputation of both lower extremities. He remains dependent on a wheelchair for mobility.    5. Primary hypertension - His blood pressure was noted to be elevated at home. Repeat blood pressure as noted above. Encouraged p.o. intake.  - Repeat blood pressure  - Encouraged p.o. intake    Follow-up  - The patient will follow up in 4 months, sooner if he has any problems.     1. Medicare annual wellness visit, subsequent    2. 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, with behavioral disturbance (HCC)    3. Stage 3a chronic kidney disease (HCC)    4. Amputation of both lower extremities, sequela (HCC)    5. Primary hypertension        I have discussed the diagnosis with the patient and the intended plan as seen in the  Orders.  The patient understands and agees with the plan.  The patient has   received an after visit summary and questions

## 2024-12-13 LAB
ANION GAP SERPL CALC-SCNC: 4 MMOL/L (ref 2–12)
BUN SERPL-MCNC: 17 MG/DL (ref 6–20)
BUN/CREAT SERPL: 15 (ref 12–20)
CALCIUM SERPL-MCNC: 10.3 MG/DL (ref 8.5–10.1)
CHLORIDE SERPL-SCNC: 106 MMOL/L (ref 97–108)
CO2 SERPL-SCNC: 29 MMOL/L (ref 21–32)
CREAT SERPL-MCNC: 1.16 MG/DL (ref 0.7–1.3)
GLUCOSE SERPL-MCNC: 85 MG/DL (ref 65–100)
POTASSIUM SERPL-SCNC: 4.1 MMOL/L (ref 3.5–5.1)
SODIUM SERPL-SCNC: 139 MMOL/L (ref 136–145)

## 2025-01-01 NOTE — ACP (ADVANCE CARE PLANNING)
CM called Pt and spoke to him in his hospital room. No ACP documents in chart. Pt is on Full Code status. Pt identified his wife, Maddy Mock [[phone]] as his Primary Healthcare Decision Maker.
118

## 2025-01-28 DIAGNOSIS — F01.50 MIXED ALZHEIMER'S AND VASCULAR DEMENTIA (HCC): ICD-10-CM

## 2025-01-28 DIAGNOSIS — F02.80 MIXED ALZHEIMER'S AND VASCULAR DEMENTIA (HCC): ICD-10-CM

## 2025-01-28 DIAGNOSIS — G30.9 MIXED ALZHEIMER'S AND VASCULAR DEMENTIA (HCC): ICD-10-CM

## 2025-01-29 RX ORDER — DONEPEZIL HYDROCHLORIDE 10 MG/1
10 TABLET, FILM COATED ORAL NIGHTLY
Qty: 90 TABLET | Refills: 1 | Status: SHIPPED | OUTPATIENT
Start: 2025-01-29

## 2025-02-24 DIAGNOSIS — G30.9 MIXED ALZHEIMER'S AND VASCULAR DEMENTIA (HCC): ICD-10-CM

## 2025-02-24 DIAGNOSIS — F01.50 MIXED ALZHEIMER'S AND VASCULAR DEMENTIA (HCC): ICD-10-CM

## 2025-02-24 DIAGNOSIS — F02.80 MIXED ALZHEIMER'S AND VASCULAR DEMENTIA (HCC): ICD-10-CM

## 2025-02-24 RX ORDER — MEMANTINE HYDROCHLORIDE 10 MG/1
10 TABLET ORAL 2 TIMES DAILY
Qty: 180 TABLET | Refills: 1 | Status: SHIPPED | OUTPATIENT
Start: 2025-02-24

## 2025-03-26 RX ORDER — TAMSULOSIN HYDROCHLORIDE 0.4 MG/1
0.4 CAPSULE ORAL NIGHTLY
Qty: 90 CAPSULE | Refills: 3 | Status: SHIPPED | OUTPATIENT
Start: 2025-03-26

## 2025-04-11 SDOH — ECONOMIC STABILITY: FOOD INSECURITY: WITHIN THE PAST 12 MONTHS, THE FOOD YOU BOUGHT JUST DIDN'T LAST AND YOU DIDN'T HAVE MONEY TO GET MORE.: NEVER TRUE

## 2025-04-11 SDOH — ECONOMIC STABILITY: TRANSPORTATION INSECURITY
IN THE PAST 12 MONTHS, HAS LACK OF TRANSPORTATION KEPT YOU FROM MEETINGS, WORK, OR FROM GETTING THINGS NEEDED FOR DAILY LIVING?: NO

## 2025-04-11 SDOH — ECONOMIC STABILITY: FOOD INSECURITY: WITHIN THE PAST 12 MONTHS, YOU WORRIED THAT YOUR FOOD WOULD RUN OUT BEFORE YOU GOT MONEY TO BUY MORE.: NEVER TRUE

## 2025-04-11 SDOH — ECONOMIC STABILITY: INCOME INSECURITY: IN THE LAST 12 MONTHS, WAS THERE A TIME WHEN YOU WERE NOT ABLE TO PAY THE MORTGAGE OR RENT ON TIME?: NO

## 2025-04-11 SDOH — ECONOMIC STABILITY: TRANSPORTATION INSECURITY
IN THE PAST 12 MONTHS, HAS THE LACK OF TRANSPORTATION KEPT YOU FROM MEDICAL APPOINTMENTS OR FROM GETTING MEDICATIONS?: NO

## 2025-04-14 ENCOUNTER — RESULTS FOLLOW-UP (OUTPATIENT)
Facility: CLINIC | Age: 75
End: 2025-04-14

## 2025-04-14 ENCOUNTER — OFFICE VISIT (OUTPATIENT)
Facility: CLINIC | Age: 75
End: 2025-04-14
Payer: MEDICARE

## 2025-04-14 VITALS
RESPIRATION RATE: 16 BRPM | DIASTOLIC BLOOD PRESSURE: 84 MMHG | SYSTOLIC BLOOD PRESSURE: 136 MMHG | OXYGEN SATURATION: 95 % | BODY MASS INDEX: 17.07 KG/M2 | TEMPERATURE: 97.6 F | HEART RATE: 95 BPM | HEIGHT: 67 IN

## 2025-04-14 DIAGNOSIS — R73.02 IGT (IMPAIRED GLUCOSE TOLERANCE): ICD-10-CM

## 2025-04-14 DIAGNOSIS — S88.912S AMPUTATION OF BOTH LOWER EXTREMITIES, SEQUELA: ICD-10-CM

## 2025-04-14 DIAGNOSIS — F02.818 DEMENTIA DUE TO ANOTHER GENERAL MEDICAL CONDITION, WITH BEHAVIORAL DISTURBANCE (HCC): ICD-10-CM

## 2025-04-14 DIAGNOSIS — R35.1 NOCTURIA: ICD-10-CM

## 2025-04-14 DIAGNOSIS — S88.911S AMPUTATION OF BOTH LOWER EXTREMITIES, SEQUELA: ICD-10-CM

## 2025-04-14 DIAGNOSIS — N18.32 STAGE 3B CHRONIC KIDNEY DISEASE (HCC): Primary | ICD-10-CM

## 2025-04-14 DIAGNOSIS — Z12.11 SCREEN FOR COLON CANCER: ICD-10-CM

## 2025-04-14 DIAGNOSIS — E78.5 DYSLIPIDEMIA: ICD-10-CM

## 2025-04-14 DIAGNOSIS — I10 PRIMARY HYPERTENSION: ICD-10-CM

## 2025-04-14 LAB
ALBUMIN SERPL-MCNC: 3.8 G/DL (ref 3.5–5)
ALBUMIN/GLOB SERPL: 1 (ref 1.1–2.2)
ALP SERPL-CCNC: 88 U/L (ref 45–117)
ALT SERPL-CCNC: 22 U/L (ref 12–78)
ANION GAP SERPL CALC-SCNC: 4 MMOL/L (ref 2–12)
APPEARANCE UR: CLEAR
AST SERPL-CCNC: 22 U/L (ref 15–37)
BACTERIA URNS QL MICRO: ABNORMAL /HPF
BASOPHILS # BLD: 0.05 K/UL (ref 0–0.1)
BASOPHILS NFR BLD: 1.1 % (ref 0–1)
BILIRUB SERPL-MCNC: 0.7 MG/DL (ref 0.2–1)
BILIRUB UR QL: NEGATIVE
BUN SERPL-MCNC: 17 MG/DL (ref 6–20)
BUN/CREAT SERPL: 14 (ref 12–20)
CALCIUM SERPL-MCNC: 10.5 MG/DL (ref 8.5–10.1)
CHLORIDE SERPL-SCNC: 103 MMOL/L (ref 97–108)
CHOLEST SERPL-MCNC: 178 MG/DL
CO2 SERPL-SCNC: 31 MMOL/L (ref 21–32)
COLOR UR: ABNORMAL
CREAT SERPL-MCNC: 1.23 MG/DL (ref 0.7–1.3)
DIFFERENTIAL METHOD BLD: ABNORMAL
EOSINOPHIL # BLD: 0.2 K/UL (ref 0–0.4)
EOSINOPHIL NFR BLD: 4.5 % (ref 0–7)
EPITH CASTS URNS QL MICRO: ABNORMAL /LPF
ERYTHROCYTE [DISTWIDTH] IN BLOOD BY AUTOMATED COUNT: 15 % (ref 11.5–14.5)
EST. AVERAGE GLUCOSE BLD GHB EST-MCNC: 103 MG/DL
GLOBULIN SER CALC-MCNC: 3.8 G/DL (ref 2–4)
GLUCOSE SERPL-MCNC: 111 MG/DL (ref 65–100)
GLUCOSE UR STRIP.AUTO-MCNC: NEGATIVE MG/DL
HBA1C MFR BLD: 5.2 % (ref 4–5.6)
HCT VFR BLD AUTO: 34.4 % (ref 36.6–50.3)
HDLC SERPL-MCNC: 98 MG/DL
HDLC SERPL: 1.8 (ref 0–5)
HGB BLD-MCNC: 11.9 G/DL (ref 12.1–17)
HGB UR QL STRIP: ABNORMAL
IMM GRANULOCYTES # BLD AUTO: 0.01 K/UL (ref 0–0.04)
IMM GRANULOCYTES NFR BLD AUTO: 0.2 % (ref 0–0.5)
KETONES UR QL STRIP.AUTO: NEGATIVE MG/DL
LDLC SERPL CALC-MCNC: 72.2 MG/DL (ref 0–100)
LEUKOCYTE ESTERASE UR QL STRIP.AUTO: ABNORMAL
LYMPHOCYTES # BLD: 1.58 K/UL (ref 0.8–3.5)
LYMPHOCYTES NFR BLD: 35.2 % (ref 12–49)
MCH RBC QN AUTO: 25.5 PG (ref 26–34)
MCHC RBC AUTO-ENTMCNC: 34.6 G/DL (ref 30–36.5)
MCV RBC AUTO: 73.8 FL (ref 80–99)
MONOCYTES # BLD: 0.38 K/UL (ref 0–1)
MONOCYTES NFR BLD: 8.5 % (ref 5–13)
NEUTS SEG # BLD: 2.27 K/UL (ref 1.8–8)
NEUTS SEG NFR BLD: 50.5 % (ref 32–75)
NITRITE UR QL STRIP.AUTO: NEGATIVE
NRBC # BLD: 0 K/UL (ref 0–0.01)
NRBC BLD-RTO: 0 PER 100 WBC
PH UR STRIP: 7 (ref 5–8)
PLATELET # BLD AUTO: 99 K/UL (ref 150–400)
POTASSIUM SERPL-SCNC: 4 MMOL/L (ref 3.5–5.1)
PROT SERPL-MCNC: 7.6 G/DL (ref 6.4–8.2)
PROT UR STRIP-MCNC: NEGATIVE MG/DL
PSA SERPL-MCNC: 1.2 NG/ML (ref 0.01–4)
RBC # BLD AUTO: 4.66 M/UL (ref 4.1–5.7)
RBC #/AREA URNS HPF: ABNORMAL /HPF (ref 0–5)
RBC MORPH BLD: ABNORMAL
SODIUM SERPL-SCNC: 138 MMOL/L (ref 136–145)
SP GR UR REFRACTOMETRY: 1.02 (ref 1–1.03)
TRIGL SERPL-MCNC: 39 MG/DL
URINE CULTURE IF INDICATED: ABNORMAL
UROBILINOGEN UR QL STRIP.AUTO: 1 EU/DL (ref 0.2–1)
VLDLC SERPL CALC-MCNC: 7.8 MG/DL
WBC # BLD AUTO: 4.5 K/UL (ref 4.1–11.1)
WBC URNS QL MICRO: ABNORMAL /HPF (ref 0–4)

## 2025-04-14 PROCEDURE — G8419 CALC BMI OUT NRM PARAM NOF/U: HCPCS | Performed by: INTERNAL MEDICINE

## 2025-04-14 PROCEDURE — 1123F ACP DISCUSS/DSCN MKR DOCD: CPT | Performed by: INTERNAL MEDICINE

## 2025-04-14 PROCEDURE — 3075F SYST BP GE 130 - 139MM HG: CPT | Performed by: INTERNAL MEDICINE

## 2025-04-14 PROCEDURE — 3017F COLORECTAL CA SCREEN DOC REV: CPT | Performed by: INTERNAL MEDICINE

## 2025-04-14 PROCEDURE — 99214 OFFICE O/P EST MOD 30 MIN: CPT | Performed by: INTERNAL MEDICINE

## 2025-04-14 PROCEDURE — 1126F AMNT PAIN NOTED NONE PRSNT: CPT | Performed by: INTERNAL MEDICINE

## 2025-04-14 PROCEDURE — G8427 DOCREV CUR MEDS BY ELIG CLIN: HCPCS | Performed by: INTERNAL MEDICINE

## 2025-04-14 PROCEDURE — 1159F MED LIST DOCD IN RCRD: CPT | Performed by: INTERNAL MEDICINE

## 2025-04-14 PROCEDURE — 3079F DIAST BP 80-89 MM HG: CPT | Performed by: INTERNAL MEDICINE

## 2025-04-14 PROCEDURE — 1036F TOBACCO NON-USER: CPT | Performed by: INTERNAL MEDICINE

## 2025-04-14 ASSESSMENT — ANXIETY QUESTIONNAIRES
GAD7 TOTAL SCORE: 0
6. BECOMING EASILY ANNOYED OR IRRITABLE: NOT AT ALL
IF YOU CHECKED OFF ANY PROBLEMS ON THIS QUESTIONNAIRE, HOW DIFFICULT HAVE THESE PROBLEMS MADE IT FOR YOU TO DO YOUR WORK, TAKE CARE OF THINGS AT HOME, OR GET ALONG WITH OTHER PEOPLE: NOT DIFFICULT AT ALL
2. NOT BEING ABLE TO STOP OR CONTROL WORRYING: NOT AT ALL
4. TROUBLE RELAXING: NOT AT ALL
7. FEELING AFRAID AS IF SOMETHING AWFUL MIGHT HAPPEN: NOT AT ALL
1. FEELING NERVOUS, ANXIOUS, OR ON EDGE: NOT AT ALL
3. WORRYING TOO MUCH ABOUT DIFFERENT THINGS: NOT AT ALL
5. BEING SO RESTLESS THAT IT IS HARD TO SIT STILL: NOT AT ALL

## 2025-04-14 ASSESSMENT — PATIENT HEALTH QUESTIONNAIRE - PHQ9
SUM OF ALL RESPONSES TO PHQ QUESTIONS 1-9: 0
SUM OF ALL RESPONSES TO PHQ QUESTIONS 1-9: 0
1. LITTLE INTEREST OR PLEASURE IN DOING THINGS: NOT AT ALL
2. FEELING DOWN, DEPRESSED OR HOPELESS: NOT AT ALL
SUM OF ALL RESPONSES TO PHQ QUESTIONS 1-9: 0
SUM OF ALL RESPONSES TO PHQ QUESTIONS 1-9: 0

## 2025-04-14 NOTE — PROGRESS NOTES
[facility]  Joce Killian MD, FACP, CMD  [address] [address]  Phone:  [phone]  Fax: [phone]       Chief Complaint   Patient presents with    Follow-up    Hypertension   .      SUBJECTIVE:  History of Present Illness         Alejandra Delgado is a 74 y.o. male  patient returns today with a known history of chronic kidney disease, followed by Dr. Freeman Fletcher, status post bilateral amputations of both lower extremities, dementia of Alzheimer's type, dyslipidemia, primary hypertension, and glucose intolerance. He is accompanied by his wife.    His wife reports that he has been experiencing a decrease in appetite and increased sleepiness, often falling asleep during meals. He also exhibits signs of confusion and forgetfulness. Additionally, he has been incorporating elements from television into his reality. He continues to maintain personal hygiene independently. His hobbies include straightening out his covers, which he does throughout the day.    Weight loss has been noted due to decreased food intake.    He is not currently on any medication for blood pressure management.       Current Outpatient Medications   Medication Sig Dispense Refill    tamsulosin (FLOMAX) 0.4 MG capsule TAKE 1 CAPSULE BY MOUTH EVERY NIGHT 90 capsule 3    memantine (NAMENDA) 10 MG tablet Take 1 tablet by mouth 2 times daily 180 tablet 1    donepezil (ARICEPT) 10 MG tablet Take 1 tablet by mouth nightly 90 tablet 1    atorvastatin (LIPITOR) 10 MG tablet Take 1 tablet by mouth daily 90 tablet 3    Cholecalciferol (VITAMIN D3) 125 MCG (5000 UT) CAPS Take by mouth daily       No current facility-administered medications for this visit.     Past Medical History:   Diagnosis Date    Amputation of both lower extremities     tenderness and swollen left breeast    Aneurysm     CKD (chronic kidney disease), stage III (HCC)     Freeman Fletcher MD    Conjunctivitis of right eye 08/30/2016

## 2025-08-14 ENCOUNTER — OFFICE VISIT (OUTPATIENT)
Facility: CLINIC | Age: 75
End: 2025-08-14

## 2025-08-14 VITALS
TEMPERATURE: 98.3 F | SYSTOLIC BLOOD PRESSURE: 126 MMHG | HEART RATE: 95 BPM | HEIGHT: 67 IN | BODY MASS INDEX: 17.07 KG/M2 | OXYGEN SATURATION: 96 % | RESPIRATION RATE: 16 BRPM | DIASTOLIC BLOOD PRESSURE: 80 MMHG

## 2025-08-14 DIAGNOSIS — S88.911S AMPUTATION OF BOTH LOWER EXTREMITIES, SEQUELA: ICD-10-CM

## 2025-08-14 DIAGNOSIS — S88.912S AMPUTATION OF BOTH LOWER EXTREMITIES, SEQUELA: ICD-10-CM

## 2025-08-14 DIAGNOSIS — F02.818 DEMENTIA DUE TO ANOTHER GENERAL MEDICAL CONDITION, WITH BEHAVIORAL DISTURBANCE (HCC): ICD-10-CM

## 2025-08-14 DIAGNOSIS — G30.9 ALZHEIMER'S DISEASE, UNSPECIFIED (CODE) (HCC): Primary | ICD-10-CM

## 2025-08-14 DIAGNOSIS — N18.32 STAGE 3B CHRONIC KIDNEY DISEASE (HCC): ICD-10-CM

## 2025-08-14 LAB
ANION GAP SERPL CALC-SCNC: 11 MMOL/L (ref 2–14)
BASOPHILS # BLD: 0.06 K/UL (ref 0–0.1)
BASOPHILS NFR BLD: 1.4 % (ref 0–1)
BUN SERPL-MCNC: 19 MG/DL (ref 8–23)
BUN/CREAT SERPL: 16 (ref 12–20)
CALCIUM SERPL-MCNC: 10.4 MG/DL (ref 8.8–10.2)
CALCIUM SERPL-MCNC: 10.8 MG/DL (ref 8.8–10.2)
CHLORIDE SERPL-SCNC: 102 MMOL/L (ref 98–107)
CO2 SERPL-SCNC: 27 MMOL/L (ref 20–29)
CREAT SERPL-MCNC: 1.13 MG/DL (ref 0.7–1.2)
DIFFERENTIAL METHOD BLD: ABNORMAL
EOSINOPHIL # BLD: 0.14 K/UL (ref 0–0.4)
EOSINOPHIL NFR BLD: 3.4 % (ref 0–7)
ERYTHROCYTE [DISTWIDTH] IN BLOOD BY AUTOMATED COUNT: 14.6 % (ref 11.5–14.5)
GLUCOSE SERPL-MCNC: 110 MG/DL (ref 65–100)
HCT VFR BLD AUTO: 35 % (ref 36.6–50.3)
HGB BLD-MCNC: 12.1 G/DL (ref 12.1–17)
IMM GRANULOCYTES # BLD AUTO: 0.04 K/UL (ref 0–0.04)
IMM GRANULOCYTES NFR BLD AUTO: 1 % (ref 0–0.5)
LYMPHOCYTES # BLD: 1.6 K/UL (ref 0.8–3.5)
LYMPHOCYTES NFR BLD: 38.1 % (ref 12–49)
MCH RBC QN AUTO: 25.9 PG (ref 26–34)
MCHC RBC AUTO-ENTMCNC: 34.6 G/DL (ref 30–36.5)
MCV RBC AUTO: 74.9 FL (ref 80–99)
MONOCYTES # BLD: 0.31 K/UL (ref 0–1)
MONOCYTES NFR BLD: 7.4 % (ref 5–13)
NEUTS SEG # BLD: 2.05 K/UL (ref 1.8–8)
NEUTS SEG NFR BLD: 48.7 % (ref 32–75)
NRBC # BLD: 0 K/UL (ref 0–0.01)
NRBC BLD-RTO: 0 PER 100 WBC
PLATELET # BLD AUTO: 71 K/UL (ref 150–400)
POTASSIUM SERPL-SCNC: 4.1 MMOL/L (ref 3.5–5.1)
PTH-INTACT SERPL-MCNC: 58.3 PG/ML (ref 15–65)
RBC # BLD AUTO: 4.67 M/UL (ref 4.1–5.7)
RBC MORPH BLD: ABNORMAL
SODIUM SERPL-SCNC: 140 MMOL/L (ref 136–145)
WBC # BLD AUTO: 4.2 K/UL (ref 4.1–11.1)

## 2025-08-14 SDOH — ECONOMIC STABILITY: FOOD INSECURITY: WITHIN THE PAST 12 MONTHS, THE FOOD YOU BOUGHT JUST DIDN'T LAST AND YOU DIDN'T HAVE MONEY TO GET MORE.: NEVER TRUE

## 2025-08-14 SDOH — ECONOMIC STABILITY: FOOD INSECURITY: WITHIN THE PAST 12 MONTHS, YOU WORRIED THAT YOUR FOOD WOULD RUN OUT BEFORE YOU GOT MONEY TO BUY MORE.: NEVER TRUE

## 2025-08-14 ASSESSMENT — PATIENT HEALTH QUESTIONNAIRE - PHQ9
2. FEELING DOWN, DEPRESSED OR HOPELESS: NOT AT ALL
SUM OF ALL RESPONSES TO PHQ QUESTIONS 1-9: 0
1. LITTLE INTEREST OR PLEASURE IN DOING THINGS: NOT AT ALL
SUM OF ALL RESPONSES TO PHQ QUESTIONS 1-9: 0

## 2025-08-15 LAB — CA-I SERPL ISE-MCNC: 5.5 MG/DL (ref 4.5–5.6)

## 2025-08-28 ENCOUNTER — OFFICE VISIT (OUTPATIENT)
Age: 75
End: 2025-08-28
Payer: MEDICARE

## 2025-08-28 VITALS
DIASTOLIC BLOOD PRESSURE: 60 MMHG | SYSTOLIC BLOOD PRESSURE: 130 MMHG | HEART RATE: 97 BPM | WEIGHT: 105 LBS | OXYGEN SATURATION: 97 % | BODY MASS INDEX: 16.45 KG/M2

## 2025-08-28 DIAGNOSIS — F01.50 MIXED ALZHEIMER'S AND VASCULAR DEMENTIA (HCC): ICD-10-CM

## 2025-08-28 DIAGNOSIS — F02.80 MIXED ALZHEIMER'S AND VASCULAR DEMENTIA (HCC): Primary | ICD-10-CM

## 2025-08-28 DIAGNOSIS — F02.80 MIXED ALZHEIMER'S AND VASCULAR DEMENTIA (HCC): ICD-10-CM

## 2025-08-28 DIAGNOSIS — G30.9 MIXED ALZHEIMER'S AND VASCULAR DEMENTIA (HCC): Primary | ICD-10-CM

## 2025-08-28 DIAGNOSIS — G24.9 DYSKINESIA: ICD-10-CM

## 2025-08-28 DIAGNOSIS — F01.50 MIXED ALZHEIMER'S AND VASCULAR DEMENTIA (HCC): Primary | ICD-10-CM

## 2025-08-28 DIAGNOSIS — G30.9 MIXED ALZHEIMER'S AND VASCULAR DEMENTIA (HCC): ICD-10-CM

## 2025-08-28 PROCEDURE — 99214 OFFICE O/P EST MOD 30 MIN: CPT | Performed by: PSYCHIATRY & NEUROLOGY

## 2025-08-28 PROCEDURE — 3075F SYST BP GE 130 - 139MM HG: CPT | Performed by: PSYCHIATRY & NEUROLOGY

## 2025-08-28 PROCEDURE — G8419 CALC BMI OUT NRM PARAM NOF/U: HCPCS | Performed by: PSYCHIATRY & NEUROLOGY

## 2025-08-28 PROCEDURE — 1126F AMNT PAIN NOTED NONE PRSNT: CPT | Performed by: PSYCHIATRY & NEUROLOGY

## 2025-08-28 PROCEDURE — 1159F MED LIST DOCD IN RCRD: CPT | Performed by: PSYCHIATRY & NEUROLOGY

## 2025-08-28 PROCEDURE — 1123F ACP DISCUSS/DSCN MKR DOCD: CPT | Performed by: PSYCHIATRY & NEUROLOGY

## 2025-08-28 PROCEDURE — 3017F COLORECTAL CA SCREEN DOC REV: CPT | Performed by: PSYCHIATRY & NEUROLOGY

## 2025-08-28 PROCEDURE — 3078F DIAST BP <80 MM HG: CPT | Performed by: PSYCHIATRY & NEUROLOGY

## 2025-08-28 PROCEDURE — 1036F TOBACCO NON-USER: CPT | Performed by: PSYCHIATRY & NEUROLOGY

## 2025-08-28 PROCEDURE — G8427 DOCREV CUR MEDS BY ELIG CLIN: HCPCS | Performed by: PSYCHIATRY & NEUROLOGY

## 2025-08-28 RX ORDER — MEMANTINE HYDROCHLORIDE 10 MG/1
10 TABLET ORAL 2 TIMES DAILY
Qty: 180 TABLET | Refills: 1 | Status: SHIPPED | OUTPATIENT
Start: 2025-08-28

## 2025-08-28 RX ORDER — DONEPEZIL HYDROCHLORIDE 10 MG/1
10 TABLET, FILM COATED ORAL NIGHTLY
Qty: 90 TABLET | Refills: 3 | Status: SHIPPED | OUTPATIENT
Start: 2025-08-28

## (undated) DEVICE — KENDALL RADIOLUCENT FOAM MONITORING ELECTRODE RECTANGULAR SHAPE: Brand: KENDALL

## (undated) DEVICE — SOLIDIFIER MEDC 1200ML -- CONVERT TO 356117

## (undated) DEVICE — BASIN EMSIS 16OZ GRAPHITE PLAS KID SHP MOLD GRAD FOR ORAL

## (undated) DEVICE — STRAINER URIN CALC RNL MSH -- CONVERT TO ITEM 357634

## (undated) DEVICE — SNARE ENDOSCP M L240CM W27MM SHTH DIA2.4MM CHN 2.8MM OVL

## (undated) DEVICE — CONTAINER SPEC 20 ML LID NEUT BUFF FORMALIN 10 % POLYPR STS

## (undated) DEVICE — NON-REM POLYHESIVE PATIENT RETURN ELECTRODE: Brand: VALLEYLAB

## (undated) DEVICE — TRAP SUC MUCOUS 70ML -- MEDICHOICE MEDLINE

## (undated) DEVICE — TOWEL 4 PLY TISS 19X30 SUE WHT

## (undated) DEVICE — FILTER IV 5UM L1.75IN FLX STRW FOR FLD ASPIR FR GLS AMP

## (undated) DEVICE — NEEDLE HYPO 18GA L1.5IN PNK S STL HUB POLYPR SHLD REG BVL

## (undated) DEVICE — CATH IV AUTOGRD BC PNK 20GA 25 -- INSYTE

## (undated) DEVICE — SYR 10ML LUER LOK 1/5ML GRAD --

## (undated) DEVICE — Device

## (undated) DEVICE — 1200 GUARD II KIT W/5MM TUBE W/O VAC TUBE: Brand: GUARDIAN

## (undated) DEVICE — SET ADMIN 16ML TBNG L100IN 2 Y INJ SITE IV PIGGY BK DISP

## (undated) DEVICE — SYR 3ML LL TIP 1/10ML GRAD --